# Patient Record
Sex: MALE | Race: WHITE | Employment: OTHER | URBAN - METROPOLITAN AREA
[De-identification: names, ages, dates, MRNs, and addresses within clinical notes are randomized per-mention and may not be internally consistent; named-entity substitution may affect disease eponyms.]

---

## 2017-05-30 ENCOUNTER — ANESTHESIA EVENT (OUTPATIENT)
Dept: SURGERY | Age: 82
DRG: 475 | End: 2017-05-30
Payer: MEDICARE

## 2017-05-30 NOTE — PERIOP NOTES
Spoke to Gerber Maurice at Dr. Caty Appiah office requesting orders, H&P, and any labs/ EKG. Per Gerber Maurice, she will fax them over once all of the documents are received.   DOS: 5/31/2017

## 2017-05-31 ENCOUNTER — ANESTHESIA (OUTPATIENT)
Dept: SURGERY | Age: 82
DRG: 475 | End: 2017-05-31
Payer: MEDICARE

## 2017-05-31 ENCOUNTER — HOSPITAL ENCOUNTER (INPATIENT)
Age: 82
LOS: 5 days | Discharge: HOME HEALTH | DRG: 475 | End: 2017-06-05
Attending: ORTHOPAEDIC SURGERY | Admitting: ORTHOPAEDIC SURGERY
Payer: MEDICARE

## 2017-05-31 PROBLEM — G62.9 NEUROPATHY: Status: ACTIVE | Noted: 2017-05-31

## 2017-05-31 PROBLEM — L08.9 INFECTION OF RIGHT FOOT: Status: ACTIVE | Noted: 2017-05-31

## 2017-05-31 PROBLEM — E13.9 DIABETES 1.5, MANAGED AS TYPE 2 (HCC): Status: ACTIVE | Noted: 2017-05-31

## 2017-05-31 PROBLEM — E03.9 UNSPECIFIED HYPOTHYROIDISM: Status: ACTIVE | Noted: 2017-05-31

## 2017-05-31 PROBLEM — E78.5 HYPERLIPIDEMIA: Status: ACTIVE | Noted: 2017-05-31

## 2017-05-31 LAB
ANION GAP BLD CALC-SCNC: 8 MMOL/L (ref 5–15)
BUN SERPL-MCNC: 18 MG/DL (ref 6–20)
BUN/CREAT SERPL: 12 (ref 12–20)
CALCIUM SERPL-MCNC: 9.7 MG/DL (ref 8.5–10.1)
CHLORIDE SERPL-SCNC: 109 MMOL/L (ref 97–108)
CO2 SERPL-SCNC: 28 MMOL/L (ref 21–32)
CREAT SERPL-MCNC: 1.52 MG/DL (ref 0.7–1.3)
EST. AVERAGE GLUCOSE BLD GHB EST-MCNC: 117 MG/DL
GLUCOSE SERPL-MCNC: 89 MG/DL (ref 65–100)
HBA1C MFR BLD: 5.7 % (ref 4.2–6.3)
MAGNESIUM SERPL-MCNC: 2 MG/DL (ref 1.6–2.4)
POTASSIUM SERPL-SCNC: 4.1 MMOL/L (ref 3.5–5.1)
SODIUM SERPL-SCNC: 145 MMOL/L (ref 136–145)
TSH SERPL DL<=0.05 MIU/L-ACNC: 3.19 UIU/ML (ref 0.36–3.74)
VIT B12 SERPL-MCNC: 1343 PG/ML (ref 211–911)

## 2017-05-31 PROCEDURE — 74011000250 HC RX REV CODE- 250

## 2017-05-31 PROCEDURE — 83735 ASSAY OF MAGNESIUM: CPT | Performed by: INTERNAL MEDICINE

## 2017-05-31 PROCEDURE — 83036 HEMOGLOBIN GLYCOSYLATED A1C: CPT | Performed by: INTERNAL MEDICINE

## 2017-05-31 PROCEDURE — 77030012406 HC DRN WND PENRS BARD -A: Performed by: ORTHOPAEDIC SURGERY

## 2017-05-31 PROCEDURE — 76210000006 HC OR PH I REC 0.5 TO 1 HR: Performed by: ORTHOPAEDIC SURGERY

## 2017-05-31 PROCEDURE — 84443 ASSAY THYROID STIM HORMONE: CPT | Performed by: INTERNAL MEDICINE

## 2017-05-31 PROCEDURE — 77030018836 HC SOL IRR NACL ICUM -A: Performed by: ORTHOPAEDIC SURGERY

## 2017-05-31 PROCEDURE — 76010000138 HC OR TIME 0.5 TO 1 HR: Performed by: ORTHOPAEDIC SURGERY

## 2017-05-31 PROCEDURE — 74011250636 HC RX REV CODE- 250/636: Performed by: ANESTHESIOLOGY

## 2017-05-31 PROCEDURE — 74011250637 HC RX REV CODE- 250/637: Performed by: ORTHOPAEDIC SURGERY

## 2017-05-31 PROCEDURE — 74011250636 HC RX REV CODE- 250/636

## 2017-05-31 PROCEDURE — 65270000029 HC RM PRIVATE

## 2017-05-31 PROCEDURE — 77030000032 HC CUF TRNQT ZIMM -B: Performed by: ORTHOPAEDIC SURGERY

## 2017-05-31 PROCEDURE — 77030020782 HC GWN BAIR PAWS FLX 3M -B

## 2017-05-31 PROCEDURE — 76060000032 HC ANESTHESIA 0.5 TO 1 HR: Performed by: ORTHOPAEDIC SURGERY

## 2017-05-31 PROCEDURE — 87205 SMEAR GRAM STAIN: CPT | Performed by: ORTHOPAEDIC SURGERY

## 2017-05-31 PROCEDURE — 77010033678 HC OXYGEN DAILY

## 2017-05-31 PROCEDURE — 74011250636 HC RX REV CODE- 250/636: Performed by: INTERNAL MEDICINE

## 2017-05-31 PROCEDURE — 87075 CULTR BACTERIA EXCEPT BLOOD: CPT | Performed by: ORTHOPAEDIC SURGERY

## 2017-05-31 PROCEDURE — 88311 DECALCIFY TISSUE: CPT | Performed by: ORTHOPAEDIC SURGERY

## 2017-05-31 PROCEDURE — 77030002933 HC SUT MCRYL J&J -A: Performed by: ORTHOPAEDIC SURGERY

## 2017-05-31 PROCEDURE — 77030002916 HC SUT ETHLN J&J -A: Performed by: ORTHOPAEDIC SURGERY

## 2017-05-31 PROCEDURE — 77030011640 HC PAD GRND REM COVD -A: Performed by: ORTHOPAEDIC SURGERY

## 2017-05-31 PROCEDURE — 36415 COLL VENOUS BLD VENIPUNCTURE: CPT | Performed by: INTERNAL MEDICINE

## 2017-05-31 PROCEDURE — 88305 TISSUE EXAM BY PATHOLOGIST: CPT | Performed by: ORTHOPAEDIC SURGERY

## 2017-05-31 PROCEDURE — 0Y6M0ZF DETACHMENT AT RIGHT FOOT, PARTIAL 5TH RAY, OPEN APPROACH: ICD-10-PCS | Performed by: ORTHOPAEDIC SURGERY

## 2017-05-31 PROCEDURE — 82607 VITAMIN B-12: CPT | Performed by: INTERNAL MEDICINE

## 2017-05-31 PROCEDURE — 80048 BASIC METABOLIC PNL TOTAL CA: CPT | Performed by: INTERNAL MEDICINE

## 2017-05-31 RX ORDER — SODIUM CHLORIDE, SODIUM LACTATE, POTASSIUM CHLORIDE, CALCIUM CHLORIDE 600; 310; 30; 20 MG/100ML; MG/100ML; MG/100ML; MG/100ML
125 INJECTION, SOLUTION INTRAVENOUS CONTINUOUS
Status: DISCONTINUED | OUTPATIENT
Start: 2017-05-31 | End: 2017-05-31 | Stop reason: HOSPADM

## 2017-05-31 RX ORDER — MELATONIN
1000 DAILY
Status: DISCONTINUED | OUTPATIENT
Start: 2017-06-01 | End: 2017-06-05 | Stop reason: HOSPADM

## 2017-05-31 RX ORDER — LIDOCAINE HYDROCHLORIDE 20 MG/ML
INJECTION, SOLUTION EPIDURAL; INFILTRATION; INTRACAUDAL; PERINEURAL AS NEEDED
Status: DISCONTINUED | OUTPATIENT
Start: 2017-05-31 | End: 2017-05-31 | Stop reason: HOSPADM

## 2017-05-31 RX ORDER — THERA TABS 400 MCG
1 TAB ORAL DAILY
Status: DISCONTINUED | OUTPATIENT
Start: 2017-06-01 | End: 2017-06-05 | Stop reason: HOSPADM

## 2017-05-31 RX ORDER — SODIUM CHLORIDE 0.9 % (FLUSH) 0.9 %
5-10 SYRINGE (ML) INJECTION EVERY 8 HOURS
Status: DISCONTINUED | OUTPATIENT
Start: 2017-05-31 | End: 2017-06-05 | Stop reason: HOSPADM

## 2017-05-31 RX ORDER — LIDOCAINE HYDROCHLORIDE 10 MG/ML
0.1 INJECTION, SOLUTION EPIDURAL; INFILTRATION; INTRACAUDAL; PERINEURAL AS NEEDED
Status: DISCONTINUED | OUTPATIENT
Start: 2017-05-31 | End: 2017-05-31 | Stop reason: HOSPADM

## 2017-05-31 RX ORDER — MIDAZOLAM HYDROCHLORIDE 1 MG/ML
INJECTION, SOLUTION INTRAMUSCULAR; INTRAVENOUS AS NEEDED
Status: DISCONTINUED | OUTPATIENT
Start: 2017-05-31 | End: 2017-05-31 | Stop reason: HOSPADM

## 2017-05-31 RX ORDER — FENTANYL CITRATE 50 UG/ML
INJECTION, SOLUTION INTRAMUSCULAR; INTRAVENOUS AS NEEDED
Status: DISCONTINUED | OUTPATIENT
Start: 2017-05-31 | End: 2017-05-31 | Stop reason: HOSPADM

## 2017-05-31 RX ORDER — ASPIRIN 325 MG
325 TABLET ORAL DAILY
Status: DISCONTINUED | OUTPATIENT
Start: 2017-06-01 | End: 2017-06-05 | Stop reason: HOSPADM

## 2017-05-31 RX ORDER — SODIUM CHLORIDE 9 MG/ML
75 INJECTION, SOLUTION INTRAVENOUS CONTINUOUS
Status: DISCONTINUED | OUTPATIENT
Start: 2017-05-31 | End: 2017-06-04

## 2017-05-31 RX ORDER — SODIUM CHLORIDE 0.9 % (FLUSH) 0.9 %
5-10 SYRINGE (ML) INJECTION AS NEEDED
Status: DISCONTINUED | OUTPATIENT
Start: 2017-05-31 | End: 2017-05-31 | Stop reason: HOSPADM

## 2017-05-31 RX ORDER — OXYCODONE AND ACETAMINOPHEN 5; 325 MG/1; MG/1
1 TABLET ORAL
Status: DISCONTINUED | OUTPATIENT
Start: 2017-05-31 | End: 2017-06-05 | Stop reason: HOSPADM

## 2017-05-31 RX ORDER — SODIUM CHLORIDE 0.9 % (FLUSH) 0.9 %
5-10 SYRINGE (ML) INJECTION EVERY 8 HOURS
Status: DISCONTINUED | OUTPATIENT
Start: 2017-05-31 | End: 2017-05-31 | Stop reason: HOSPADM

## 2017-05-31 RX ORDER — HYDROMORPHONE HYDROCHLORIDE 1 MG/ML
.5-1 INJECTION, SOLUTION INTRAMUSCULAR; INTRAVENOUS; SUBCUTANEOUS
Status: DISCONTINUED | OUTPATIENT
Start: 2017-05-31 | End: 2017-05-31 | Stop reason: HOSPADM

## 2017-05-31 RX ORDER — ONDANSETRON 2 MG/ML
4 INJECTION INTRAMUSCULAR; INTRAVENOUS AS NEEDED
Status: DISCONTINUED | OUTPATIENT
Start: 2017-05-31 | End: 2017-05-31 | Stop reason: HOSPADM

## 2017-05-31 RX ORDER — ATORVASTATIN CALCIUM 10 MG/1
10 TABLET, FILM COATED ORAL
Status: DISCONTINUED | OUTPATIENT
Start: 2017-05-31 | End: 2017-06-05 | Stop reason: HOSPADM

## 2017-05-31 RX ORDER — LEVOTHYROXINE SODIUM 100 UG/1
100 TABLET ORAL
Status: DISCONTINUED | OUTPATIENT
Start: 2017-06-01 | End: 2017-06-05 | Stop reason: HOSPADM

## 2017-05-31 RX ORDER — SODIUM CHLORIDE 0.9 % (FLUSH) 0.9 %
5-10 SYRINGE (ML) INJECTION AS NEEDED
Status: DISCONTINUED | OUTPATIENT
Start: 2017-05-31 | End: 2017-06-05 | Stop reason: HOSPADM

## 2017-05-31 RX ORDER — PROPOFOL 10 MG/ML
INJECTION, EMULSION INTRAVENOUS
Status: DISCONTINUED | OUTPATIENT
Start: 2017-05-31 | End: 2017-05-31 | Stop reason: HOSPADM

## 2017-05-31 RX ORDER — LIDOCAINE HYDROCHLORIDE 20 MG/ML
INJECTION, SOLUTION EPIDURAL; INFILTRATION; INTRACAUDAL; PERINEURAL AS NEEDED
Status: DISCONTINUED | OUTPATIENT
Start: 2017-05-31 | End: 2017-05-31

## 2017-05-31 RX ADMIN — Medication 10 ML: at 15:42

## 2017-05-31 RX ADMIN — MIDAZOLAM HYDROCHLORIDE 2 MG: 1 INJECTION, SOLUTION INTRAMUSCULAR; INTRAVENOUS at 10:15

## 2017-05-31 RX ADMIN — PROPOFOL 75 MCG/KG/MIN: 10 INJECTION, EMULSION INTRAVENOUS at 11:20

## 2017-05-31 RX ADMIN — ATORVASTATIN CALCIUM 10 MG: 10 TABLET, FILM COATED ORAL at 21:33

## 2017-05-31 RX ADMIN — MIDAZOLAM HYDROCHLORIDE 1 MG: 1 INJECTION, SOLUTION INTRAMUSCULAR; INTRAVENOUS at 10:17

## 2017-05-31 RX ADMIN — FENTANYL CITRATE 50 MCG: 50 INJECTION, SOLUTION INTRAMUSCULAR; INTRAVENOUS at 10:15

## 2017-05-31 RX ADMIN — SODIUM CHLORIDE, SODIUM LACTATE, POTASSIUM CHLORIDE, AND CALCIUM CHLORIDE 125 ML/HR: 600; 310; 30; 20 INJECTION, SOLUTION INTRAVENOUS at 09:24

## 2017-05-31 RX ADMIN — LIDOCAINE HYDROCHLORIDE 80 MG: 20 INJECTION, SOLUTION EPIDURAL; INFILTRATION; INTRACAUDAL; PERINEURAL at 12:03

## 2017-05-31 RX ADMIN — Medication 10 ML: at 21:34

## 2017-05-31 RX ADMIN — SODIUM CHLORIDE 75 ML/HR: 900 INJECTION, SOLUTION INTRAVENOUS at 16:58

## 2017-05-31 NOTE — PROGRESS NOTES
5/31/2017 4:13 PM Met with pt and pt's wife, Darcie Awad. Charted address and phone number confirmed. Pt lives with his wife in a 1 story home in [place], there are 4 steps to enter. Pt has rx coverage and fills his scripts at [facility]. EMR reviewed, ID consult noted. Discussed possible iv abx and home health with pt. At  [facility] was selected as preferred home health company. [facility] was selected as preferred iv infusion company. Referrals sent to both agencies via [employer]. CM will follow up. МАРИЯ Torres  Care Management Interventions  PCP Verified by CM: Yes [facility] )  Mode of Transport at Discharge:  Other (see comment) (Pt's wife )  MyChart Signup: No  Discharge Durable Medical Equipment: No (Pt owns a cane and RW. )  Physical Therapy Consult: Yes  Occupational Therapy Consult: No  Speech Therapy Consult: No  Current Support Network: Lives with Spouse  Confirm Follow Up Transport: Family

## 2017-05-31 NOTE — PROGRESS NOTES
1355: Left message with Dr Akira Sanon [facility] nurse Roshan April to contact ID at [phone] as per new policy to have MD call their office. Bedside and Verbal shift change report given to Leah (oncoming nurse) by [facility] (offgoing nurse). Report included the following information SBAR, Kardex, Procedure Summary, Intake/Output, MAR and Recent Results.

## 2017-05-31 NOTE — CONSULTS
Consult History and Physical Exam    NAME:  Lani Mayorga   :   1935   MRN:  [id number]     PCP:  Magaly Gallegos MD   Referring: Zane Rodarte MD     Date/Time:  2017           Assessment/Plan:       Neuropathy (2017): Sounds to be idiopathic. Prior testing has been unrevealing.   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 -- wound care per ortho   -- ID has been consulted for abx management    **3:52 PM  Has elevated creatinine. Unknown if this is new or chronic. Will ask for records from PCP to compare. Subjective:   REASON FOR CONSULT:  \"Diabetes\"    CHIEF COMPLAINT:  \"I don't have diabetes\"    HISTORY OF PRESENT ILLNESS:     Mr. Courtney Cool is a 81 y.o.  male who is admitted for right foot surgery due to neuropathic ulcer with infection. Mr. Courtney Cool today complains of feeling well. Denies hx of diabetes. Has been followed by PCP with testing. Has had neuropathy of both LE for at least 7 years and has been through testing without cause for the neuropathy. Denies fh of diabetes as well. Denies chest pain or sob. No n/v. Does not recall being tested for b12 deficiencies. Denies pain from foot currently.       Past Medical History:   Diagnosis Date    Cancer [facility]) 2009    MELANOMA,   PROSTATE    Hyperlipidemia     Thyroid disease         Past Surgical History:   Procedure Laterality Date    HX ABDOMINAL WALL DEFECT REPAIR  8427    UMBILICAL    HX APPENDECTOMY      HX OTHER SURGICAL  2002    BENIGN TUMOR THYROID    HX PROSTATECTOMY  2009    HX TONSILLECTOMY         Social History   Substance Use Topics    Smoking status: Never Smoker    Smokeless tobacco: Never Used    Alcohol use Yes      Comment: [id number] [facility] History   Problem Relation Age of Onset    Alcohol abuse Mother     Hypertension Mother     Heart Disease Mother     Cancer Brother      COLON        No Known Allergies     Prior to Admission medications    Medication Sig Start Date End Date Taking? Authorizing Provider   atorvastatin (LIPITOR) 10 mg tablet Take 10 mg by mouth nightly. Yes Historical Provider   Cholecalciferol, Vitamin D3, (VITAMIN D) 1,000 unit Cap Take  by mouth daily. Yes Historical Provider   multivitamin (ONE A DAY) tablet Take 1 Tab by mouth daily. Yes Historical Provider   oxyCODONE-acetaminophen (PERCOCET) 5-325 mg per tablet Take 1 Tab by mouth every six (6) hours as needed for Pain. 2/3/12   Boby Oquendo MD   levothyroxine (SYNTHROID) 100 mcg tablet Take 100 mcg by mouth Daily (before breakfast). Historical Provider   aspirin (ASPIRIN) 325 mg tablet Take 325 mg by mouth daily.     Historical Provider         Review of Systems:  (bold if positive, if negative)    Gen:  Eyes:  ENT:  CVS:  Pulm:  GI:    :    MS:  Skin:  Endo:    Hem:  Renal:    Neuro:  Numbnesstingling          Objective:      VITALS:    Vital signs reviewed; most recent are:    Visit Vitals    /54 (BP 1 Location: Right arm, BP Patient Position: At rest)    Pulse (!) 53    Temp 97.9 °F (36.6 °C)    Resp 16    Ht 5' 9\" (1.753 m)    Wt 73.6 kg (162 lb 4.1 oz)    SpO2 98%    BMI 23.96 kg/m2     SpO2 Readings from Last 6 Encounters:   05/31/17 98%   02/03/12 99%    O2 Flow Rate (L/min): 2 l/min     Intake/Output Summary (Last 24 hours) at 05/31/17 1339  Last data filed at 05/31/17 1215   Gross per 24 hour   Intake              900 ml   Output               20 ml   Net              880 ml            Exam:     Physical Exam:    Gen:  Well-developed, well-nourished, in no acute distress  HEENT:  Pink conjunctivae, hearing intact to voice, moist mucous membranes  Resp:  No accessory muscle use, clear breath sounds without wheezes rales or rhonchi  Card:  No murmurs, normal S1, S2 without thrills or peripheral edema  Abd:  Soft, non-tender, non-distended, normoactive bowel sounds are present  Musc:  No cyanosis  Skin:  No rashes, right foot in dressing  Neuro:  Cranial nerves 3-12 are grossly intact,  strength is 5/5 bilaterally, follows commands appropriately  Psych:  Alert with good insight. Oriented to person, place, and time       Reviewed prior medical records in preparation for this consult: Old medical records.     Surrogate decision maker:  spouse    Total time spent with patient: 48 [address] discussed with: Patient and Family    Discussed:  Care Plan         ___________________________________________________    Attending Physician: Yanira Julien MD

## 2017-05-31 NOTE — PROGRESS NOTES
POST ANESTHESIA CARE   DISCHARGE or TRANSFER NOTE    Lani Mayorga was   transfered      via     Bed    To   hospital room 410   . Patient was escorted by    nurse   . Patient verbalized   appreciation and was very pleased with care received  throughout their stay. Patient was discharged in   pleasant mood   . Pain at discharge/transfer was    0 /10. All personal belongings have been returned to patient, and patient/family verbally confirm receiving belongings as all present. TRANSFER - OUT REPORT:    Verbal report given to   Jose Ramon Villa RN  On   Lani Mayorga   being transferred to   [address] [place] [address]   for routine post - op       Report consisted of patients Situation, Background, Assessment and   Recommendations(SBAR). Information from the following report(s) SBAR, OR Summary and MAR was reviewed with the receiving nurse. There are currently no Active Isolations   Lines:   Peripheral IV 02/03/12 Left Hand (Active)       Peripheral IV 05/31/17 Left Forearm (Active)   Site Assessment Clean, dry, & intact 5/31/2017 12:15 PM   Phlebitis Assessment 0 5/31/2017 12:15 PM   Infiltration Assessment 0 5/31/2017 12:15 PM   Dressing Status Clean, dry, & intact 5/31/2017 12:15 PM   Dressing Type Transparent;Tape 5/31/2017 12:15 PM   Hub Color/Line Status Patent; Infusing 5/31/2017 12:15 PM   Alcohol Cap Used Yes 5/31/2017  9:22 AM       Patient transported with:       Registered Nurse    Opportunity for questions and clarification was provided.       Macdonald Claude BSN RN-BC

## 2017-05-31 NOTE — IP AVS SNAPSHOT
Current Discharge Medication List  
  
CONTINUE these medications which have NOT CHANGED Dose & Instructions Dispensing Information Comments Morning Noon Evening Bedtime  
 aspirin 325 mg tablet Commonly known as:  ASPIRIN Your last dose was: Your next dose is:    
   
   
 Dose:  325 mg Take 325 mg by mouth daily. Refills:  0 LIPITOR 10 mg tablet Generic drug:  atorvastatin Your last dose was: Your next dose is:    
   
   
 Dose:  10 mg Take 10 mg by mouth nightly. Refills:  0  
     
   
   
   
  
 multivitamin tablet Commonly known as:  ONE A DAY Your last dose was: Your next dose is:    
   
   
 Dose:  1 Tab Take 1 Tab by mouth daily. Refills:  0  
     
   
   
   
  
 oxyCODONE-acetaminophen 5-325 mg per tablet Commonly known as:  PERCOCET Your last dose was: Your next dose is:    
   
   
 Dose:  1 Tab Take 1 Tab by mouth every six (6) hours as needed for Pain. Quantity:  30 Tab Refills:  0  
     
   
   
   
  
 SYNTHROID 100 mcg tablet Generic drug:  levothyroxine Your last dose was: Your next dose is:    
   
   
 Dose:  100 mcg Take 100 mcg by mouth Daily (before breakfast). Refills:  0  
     
   
   
   
  
 VITAMIN D3 1,000 unit Cap Generic drug:  cholecalciferol Your last dose was: Your next dose is: Take  by mouth daily. Refills:  0

## 2017-05-31 NOTE — ANESTHESIA POSTPROCEDURE EVALUATION
Post-Anesthesia Evaluation and Assessment    Patient: Antonino Bradford MRN: [id number]  SSN: [id number]    YOB: 1935  Age: 81 y.o. Sex: male       Cardiovascular Function/Vital Signs  Visit Vitals    /55    Pulse (!) 54    Temp 36.4 °C (97.5 °F)    Resp 16    Ht 5' 9\" (1.753 m)    Wt 73.6 kg (162 lb 4.1 oz)    SpO2 98%    BMI 23.96 kg/m2       Patient is status post regional, MAC anesthesia for Procedure(s):  AMPUTATION RIGHT 5TH TOE (ANKLE BLOCK). Nausea/Vomiting: None    Postoperative hydration reviewed and adequate. Pain:  Pain Scale 1: Numeric (0 - 10) (05/31/17 1214)  Pain Intensity 1: 0 (05/31/17 1214)   Managed    Neurological Status:   Neuro (WDL): Within Defined Limits (05/31/17 1214)  Neuro  Neurologic State: Drowsy; Appropriate for age (05/31/17 1214)   At baseline    Mental Status and Level of Consciousness: Arousable    Pulmonary Status:   O2 Device: Room air (05/31/17 1220)   Adequate oxygenation and airway patent    Complications related to anesthesia: None    Post-anesthesia assessment completed.  No concerns    Signed By: Sandra Salazar MD     May 31, 2017

## 2017-05-31 NOTE — ANESTHESIA PROCEDURE NOTES
Peripheral Block    Start time: 5/31/2017 10:15 AM  End time: 5/31/2017 10:25 AM  Performed by: Jamie Trammell  Authorized by: Jamie Trammell       Pre-procedure: Indications: at surgeon's request and primary anesthetic    Preanesthetic Checklist: patient identified, risks and benefits discussed, site marked, timeout performed, anesthesia consent given and patient being monitored    Timeout Time: 10:15          Block Type:   Block Type:   Ankle  Laterality:  Right  Monitoring:  Continuous pulse ox, frequent vital sign checks, heart rate, responsive to questions and oxygen  Injection Technique:  Single shot  Patient Position: seated  Prep: chlorhexidine    Location:  Foot  Needle Localization:  Anatomical landmarks, infiltration and ultrasound guidance  Medication Injected:  0.5%  ropivacaine  Volume (mL):  20  Add'l Medication Injected:  2.0%  mepivacaine  Volume (mL):  20    Assessment:  Number of attempts:  1  Injection Assessment:  Incremental injection every 5 mL, local visualized surrounding nerve on ultrasound, negative aspiration for blood, no paresthesia and no intravascular symptoms  Patient tolerance:  Patient tolerated the procedure well with no immediate complications

## 2017-05-31 NOTE — IP AVS SNAPSHOT
Anatoliy Murphy 
 
 
 [address] 
[phone] Patient: Trina Bourgeois MRN: [id number] CXH:5/3/1414 You are allergic to the following No active allergies Recent Documentation Height Weight BMI Smoking Status 1.753 m 73.6 kg 23.96 kg/m2 Never Smoker Unresulted Labs Order Current Status HAPTOGLOBIN In process Emergency Contacts Name Discharge Info Relation Home Work Mobile [place] Senior DISCHARGE CAREGIVER [3] Spouse [3] [phone] About your hospitalization You were admitted on:  May 31, 2017 You last received care in the:  [facility] POST SURG ORT 1 You were discharged on:  June 5, 2017 Unit phone number:  [phone] Why you were hospitalized Your primary diagnosis was:  Not on File Your diagnoses also included:  Neuropathy, Infection Of Right Foot, Unspecified Hypothyroidism, Hyperlipidemia, Cmml (Chronic Myelomonocytic Leukemia) (Hcc), Ckd (Chronic Kidney Disease) Stage 3, Gfr 30-59 Ml/Min Providers Seen During Your Hospitalizations Provider Role Specialty Primary office phone Coty Chowdhury MD Attending Provider Orthopedic Surgery [phone] Your Primary Care Physician (PCP) Primary Care Physician Office Phone Office Fax Nadeem Reyes [phone] Follow-up Information Follow up With Details Comments Contact Info Lynn Evans [phone] on 6/6/2017 Your appointment is at [address] [address], [place], [address] [facility]  
[phone] / Fax: [phone] Coty Chowdhury MD On 6/12/2017 Appointment is at [address] [place] [address]. For 1 week follow up. If you cannot make this appointment please call the office to reschedule. [address] [place] [address] [address] [facility] [address] [place] [address] 
[phone]  Fernando Queen MD   [facility]  
 [address] [facility] [address] 
[phone] Your Appointments Tuesday June 06, 2017  3:00 PM EDT INFUSION [address] [place] with [address]  
[address] ([address]) 11 [facility] [address] [address]  
[phone] Wednesday June 07, 2017  3:00 PM EDT INFUSION [address] [place] with [address] [place] [address]  
[address] ([address]) 11 [facility] [address] [address]  
[phone] Thursday June 08, 2017  3:00 PM EDT INFUSION 90 [place] with [address] [place] [address]  
[address] ([address]) 11 [facility] [address] [address]  
[phone] Friday June 09, 2017  3:00 PM EDT INFUSION 90 [place] with [address] [place] [address]  
[address] ([address]) 11 [facility] [address] [address]  
[phone] Current Discharge Medication List  
  
CONTINUE these medications which have NOT CHANGED Dose & Instructions Dispensing Information Comments Morning Noon Evening Bedtime  
 aspirin 325 mg tablet Commonly known as:  ASPIRIN Your last dose was: Your next dose is:    
   
   
 Dose:  325 mg Take 325 mg by mouth daily. Refills:  0 LIPITOR 10 mg tablet Generic drug:  atorvastatin Your last dose was: Your next dose is:    
   
   
 Dose:  10 mg Take 10 mg by mouth nightly. Refills:  0  
     
   
   
   
  
 multivitamin tablet Commonly known as:  ONE A DAY Your last dose was: Your next dose is:    
   
   
 Dose:  1 Tab Take 1 Tab by mouth daily. Refills:  0  
     
   
   
   
  
 oxyCODONE-acetaminophen 5-325 mg per tablet Commonly known as:  PERCOCET Your last dose was: Your next dose is:    
   
   
 Dose:  1 Tab Take 1 Tab by mouth every six (6) hours as needed for Pain. Quantity:  30 Tab Refills:  0  
     
   
   
   
  
 SYNTHROID 100 mcg tablet Generic drug:  levothyroxine Your last dose was: Your next dose is:    
   
   
 Dose:  100 mcg Take 100 mcg by mouth Daily (before breakfast). Refills:  0  
     
   
   
   
  
 VITAMIN D3 1,000 unit Cap Generic drug:  cholecalciferol Your last dose was: Your next dose is: Take  by mouth daily. Refills:  0 Discharge Instructions Foot and Ankle Surgery Postop Care Dr. Cherelle Reyna Most importantly, go home and rest. 
 
Elevate your foot to heart level as much as possible. You may use ice to help with the pain. Note: Frozen peas work fine :) Change your dressing daily as instructed in the hosptial 
 
Do NOT bear weight on your foot unless specifically allowed to do so. You may bathe, but you MUST keep the dressing dry. Avoid making important decisions or driving until you are no longer taking the prescribed pain medication. Important signs and symptoms:   
 
If any of the following signs and symptoms occurs, [facility]. Please be advised if a problem arises which you feel requires immediate medical attention or you are unable to contact Dr. Lopez [facility] office you should seek immediate medical attention. Signs and symptoms to watch for include: 1. A sudden increase in swelling and /or redness or warmth at the area your surgery was performed which isnt relieved by rest, ice and elevation. 2.  Oral temperature greater than 101 degrees for 12 hours or more which isnt relieved by an increase in fluid intake and taking two Tylenol every 4-6 hours. Do not exceed 4000mg of Tylenol per day. 3.  Excessive drainage from your incisions or drainage that hasnt stopped by 72 hours. 4.  Calf pain, tenderness, redness or swelling which isnt relieved with rest and elevation. 5.  Fever, chills, nausea, vomiting or other signs and symptoms which are of concern to you. 6.  If sudden shortness of breath or chest pain occur, go to the ER or call an ambulance immediately! Call Dr. Génesis Sánchez on your way to the hospital or after you arrive. Follow up: Your Post-op appointment date & time: Monday, June 12 at 8am - [address] location Call our office at [phone] ext [phone] at any time should any complications occur, especially if you develop a fever above 101° F. Dr. Cheryle Quiñones MD & Team 
 
 
 
 
 
 
 
 
 
 
  
 
 
Discharge Orders None Introducing [facility] SERVICES! Connie Roque introduces [employer] patient portal. Now you can access parts of your medical record, email your doctor's office, and request medication refills online. 1. In your internet browser, go to [email or web address] 2. Click on the First Time User? Click Here link in the Sign In box. You will see the New Member Sign Up page. 3. Enter your [employer] Access Code exactly as it appears below. You will not need to use this code after youve completed the sign-up process. If you do not sign up before the expiration date, you must request a new code. · [employer] Access Code: [email or web address] Expires: 8/29/2017  7:55 AM 
 
4. Enter the last four digits of your Social Security Number (xxxx) and Date of Birth (mm/dd/yyyy) as indicated and click Submit. You will be taken to the next sign-up page. 5. Create a THY[employer]t ID. This will be your [employer] login ID and cannot be changed, so think of one that is secure and easy to remember. 6. Create a [employer] password. You can change your password at any time. 7. Enter your Password Reset Question and Answer. This can be used at a later time if you forget your password. 8. Enter your e-mail address.  You will receive e-mail notification when new information is available in [employer]. 9. Click Sign Up. You can now view and download portions of your medical record. 10. Click the Download Summary menu link to download a portable copy of your medical information. If you have questions, please visit the Frequently Asked Questions section of the [employer] website. Remember, [employer] is NOT to be used for urgent needs. For medical emergencies, dial 911. Now available from your iPhone and Android! General Information Please provide this summary of care documentation to your next provider. Patient Signature:  ____________________________________________________________ Date:  ____________________________________________________________  
  
Giacomo Judson Provider Signature:  ____________________________________________________________ Date:  ____________________________________________________________

## 2017-05-31 NOTE — ANESTHESIA PREPROCEDURE EVALUATION
Anesthetic History   No history of anesthetic complications            Review of Systems / Medical History  Patient summary reviewed, nursing notes reviewed and pertinent labs reviewed    Pulmonary  Within defined limits                 Neuro/Psych   Within defined limits           Cardiovascular              Hyperlipidemia    Exercise tolerance: >4 METS     GI/Hepatic/Renal  Within defined limits              Endo/Other      Hypothyroidism: well controlled  Cancer (melanoma, prostate, anal)     Other Findings              Physical Exam    Airway  Mallampati: II  TM Distance: < 4 cm  Neck ROM: normal range of motion   Mouth opening: Normal     Cardiovascular    Rhythm: regular  Rate: normal         Dental    Dentition: Upper dentition intact and Lower dentition intact     Pulmonary  Breath sounds clear to auscultation               Abdominal  GI exam deferred       Other Findings            Anesthetic Plan    ASA: 2  Anesthesia type: regional and MAC - ankle block          Induction: Intravenous  Anesthetic plan and risks discussed with: Patient

## 2017-05-31 NOTE — PERIOP NOTES
PACU IN REPORT FROM ANESTHESIA    Verbal report received from   Matthew 70   +SRNA    following Other for Procedure(s) (LRB):  AMPUTATION RIGHT 5TH TOE (ANKLE BLOCK) (Right) by  Zane Rodarte MD.    Note the anesthesia record for medications given intraoperatively. Brief Initial Visual Assessment:    Airway is:   Patent   Respiratory pattern is:    Even, Non-labored. Patient is:     alert and oriented x 2 (Person and Place.)   Skin is:   Pink, Warm and Dry. Membranes are:    Pink and Moist.   Patient is in:    No Acute Discomfort. 0 /10 pain using     Verbal Numeric Scale. - Note E-MAR for medications administered. Note assessments documented in flowsheets;any assessment variants to be found in comments or narrative perioperative nurse notes.        Post-anesthesia care now assumed, record signed by Macdonald Claude BSN, RN-BC

## 2017-05-31 NOTE — BRIEF OP NOTE
BRIEF OPERATIVE NOTE    Date of Procedure: 5/31/2017   Preoperative Diagnosis: NEUROPATHIC ULCER RIGHT FOOT   Postoperative Diagnosis: NEUROPATHIC ULCER RIGHT FOOT     Procedure(s):  AMPUTATION RIGHT 5TH TOE (ANKLE BLOCK)  Surgeon(s) and Role:     * Morenita Lopez MD - Primary         Assistant Staff:       Surgical Staff:  Circ-1: Ranulfo Montez RN  Circ-Relief: Delia Hendrix RN  Scrub Tech-1: Abundio Copper  Scrub Tech-Relief: Napoleanabel [place]  Surg Asst-1: Hiwot Cordell  Surg Asst-Relief: Carla Razo  Event Time In   Incision Start 1138   Incision Close 1206     Anesthesia: Other   Estimated Blood Loss: 0  Specimens:   ID Type Source Tests Collected by Time Destination   1 : 5th toe right foot Preservative Toe  Morenita Lopez MD 5/31/2017 1149 Pathology   1 : Wound right foot Wound Foot, Right CULTURE, ANAEROBIC, CULTURE, WOUND W GRAM STAIN Morenita Lopez MD 5/31/2017 1141 Microbiology      Findings: 0   Complications: 0  Implants: * No implants in log *

## 2017-05-31 NOTE — OP NOTES
Gavin Ron [facility] 79   201 [facility], [address]   OP NOTE       Name:  Adina Shanks   MR#:  [id number]   :  1935   Account #:  [de-identified]    Surgery Date:  2017   Date of Adm:  2017       PREOPERATIVE DIAGNOSIS: Neuropathic ulcer with sepsis, right   foot. POSTOPERATIVE DIAGNOSIS: Neuropathic ulcer with sepsis, right   foot. PROCEDURES PERFORMED: Transmetatarsal amputation, right foot,   fifth ray. SURGEON: Suni Tellez MD    ANESTHESIA: Ankle block. COMPLICATIONS: None encountered. TOURNIQUET TIME: 20 minutes. SPECIMENS REMOVED: Toe, cultures, aerobic and anaerobic, were   taken at the metatarsophalangeal joint. COMPLICATIONS: None. ESTIMATED BLOOD LOSS: Nil. DESCRIPTION OF PROCEDURE: After consents were obtained and   preoperative sedation, the patient was taken to the operating suites   and underwent an ankle block without difficulty. A pneumatic tourniquet   was placed around the right ankle, and the right foot was scrubbed and   draped in the usual sterile fashion. After Esmarch exsanguination,  the   tourniquet was inflated to 300 mmHg. A racket incision was made   surrounding the base of the fifth toe and extending laterally along the   fifth metatarsal. The incision was carried through skin and   subcutaneous tissue down to bone. Subperiosteal elevation was   carried out over the fifth metatarsal. The capsule was elevated from   the fifth metatarsal dorsally. The capsule was removed from the   proximal phalanx circumferentially and subperiosteal dissection was   carried out, allowing us to remove the toe and send to Pathology. Cultures, both aerobic and anaerobic, were taken from the   metatarsophalangeal joint, where inflamed synovial tissue, but no   purulence was present.  Copious irrigation was carried out, and then a   small Hemovac drain was placed into the wound and the wound was   closed loosely in layers using 3-0 Monocryl suture for the superficial   fascia, and 3-0 nylon in interrupted fashion for the skin. A sterile bulky   dressing was applied. The ingress tubing was secured and after   dressing was applied, the ingress irrigation was activated. The   tourniquet was then deflated. He was awakened from anesthesia and   taken to the recovery room in satisfactory condition.         MD Talia Rincon / PRINCESS   D:  05/31/2017   12:28   T:  05/31/2017   13:26   Job #:  [id number]

## 2017-06-01 PROCEDURE — 74011250637 HC RX REV CODE- 250/637: Performed by: ORTHOPAEDIC SURGERY

## 2017-06-01 PROCEDURE — 97530 THERAPEUTIC ACTIVITIES: CPT

## 2017-06-01 PROCEDURE — 74011000258 HC RX REV CODE- 258: Performed by: INTERNAL MEDICINE

## 2017-06-01 PROCEDURE — 84300 ASSAY OF URINE SODIUM: CPT | Performed by: INTERNAL MEDICINE

## 2017-06-01 PROCEDURE — 65270000029 HC RM PRIVATE

## 2017-06-01 PROCEDURE — 74011250636 HC RX REV CODE- 250/636: Performed by: INTERNAL MEDICINE

## 2017-06-01 PROCEDURE — 82570 ASSAY OF URINE CREATININE: CPT | Performed by: INTERNAL MEDICINE

## 2017-06-01 PROCEDURE — 97161 PT EVAL LOW COMPLEX 20 MIN: CPT

## 2017-06-01 PROCEDURE — 97116 GAIT TRAINING THERAPY: CPT

## 2017-06-01 PROCEDURE — 81001 URINALYSIS AUTO W/SCOPE: CPT | Performed by: INTERNAL MEDICINE

## 2017-06-01 RX ADMIN — VITAMIN D, TAB 1000IU (100/BT) 1000 UNITS: 25 TAB at 08:39

## 2017-06-01 RX ADMIN — ATORVASTATIN CALCIUM 10 MG: 10 TABLET, FILM COATED ORAL at 22:02

## 2017-06-01 RX ADMIN — CEFTRIAXONE 2 G: 2 INJECTION, POWDER, FOR SOLUTION INTRAMUSCULAR; INTRAVENOUS at 10:12

## 2017-06-01 RX ADMIN — SODIUM CHLORIDE 75 ML/HR: 900 INJECTION, SOLUTION INTRAVENOUS at 05:47

## 2017-06-01 RX ADMIN — THERA TABS 1 TABLET: TAB at 08:39

## 2017-06-01 RX ADMIN — ASPIRIN 325 MG: 325 TABLET, COATED ORAL at 08:39

## 2017-06-01 RX ADMIN — VANCOMYCIN HYDROCHLORIDE 1750 MG: 10 INJECTION, POWDER, LYOPHILIZED, FOR SOLUTION INTRAVENOUS at 10:17

## 2017-06-01 RX ADMIN — LEVOTHYROXINE SODIUM 100 MCG: 100 TABLET ORAL at 05:52

## 2017-06-01 RX ADMIN — SODIUM CHLORIDE 75 ML/HR: 900 INJECTION, SOLUTION INTRAVENOUS at 22:05

## 2017-06-01 NOTE — PROGRESS NOTES
[facility] Ron [facility] 79  380 [facility], [address] [place] [address]  [phone]      Medical Progress Note      NAME: Trina Bourgeois   :  1935  MRM:  [id number]    Date/Time: 2017         Subjective:     Chief Complaint:  Patient was seen and examined by me. Chart reviewed. Denied fevers, chills       Objective:       Vitals:       Last 24hrs VS reviewed since prior progress note. Most recent are:    Visit Vitals    /65 (BP 1 Location: Right arm, BP Patient Position: Supine)    Pulse 78    Temp 97.7 °F (36.5 °C)    Resp 18    Ht 5' 9\" (1.753 m)    Wt 73.6 kg (162 lb 4.1 oz)    SpO2 91%    BMI 23.96 kg/m2     SpO2 Readings from Last 6 Encounters:   17 91%   12 99%    O2 Flow Rate (L/min): 2 l/min     Intake/Output Summary (Last 24 hours) at 17 1058  Last data filed at 17 0544   Gross per 24 hour   Intake          2287.08 ml   Output              671 ml   Net          1616.08 ml        Exam:     Physical Exam:    Gen:  Elderly, thin, NAD  HEENT:  Pink conjunctivae, PERRL, hearing intact to voice, moist mucous membranes  Neck:  Supple, without masses, thyroid non-tender  Resp:  No accessory muscle use, clear breath sounds without wheezes rales or rhonchi  Card:  No murmurs, normal S1, S2 without thrills, bruits or peripheral edema  Abd:  Soft, non-tender, non-distended, normoactive bowel sounds are present  Musc:  No cyanosis or clubbing  Skin:  No rashes.   Right foot dressing c/d/i  Neuro:  Cranial nerves 3-12 are grossly intact, follows commands appropriately  Psych:  Good insight, oriented to person, place and time, alert    Medications Reviewed: (see below)    Lab Data Reviewed: (see below)    ______________________________________________________________________    Medications:     Current Facility-Administered Medications   Medication Dose Route Frequency    cefTRIAXone (ROCEPHIN) 2 g in 0.9% sodium chloride (MBP/ADV) 50 mL  2 g IntraVENous Q24H    vancomycin (VANCOCIN) 1,750 mg in 0.9% sodium chloride 500 mL IVPB  1,750 mg IntraVENous ONCE    [START ON 6/2/2017] vancomycin (VANCOCIN) 1,000 mg in 0.9% sodium chloride (MBP/ADV) 250 mL  1,000 mg IntraVENous Q24H    levothyroxine (SYNTHROID) tablet 100 mcg  100 mcg Oral ACB    aspirin (ASPIRIN) tablet 325 mg  325 mg Oral DAILY    atorvastatin (LIPITOR) tablet 10 mg  10 mg Oral QHS    cholecalciferol (VITAMIN D3) tablet 1,000 Units  1,000 Units Oral DAILY    therapeutic multivitamin (THERAGRAN) tablet 1 Tab  1 Tab Oral DAILY    oxyCODONE-acetaminophen (PERCOCET) 5-325 mg per tablet 1 Tab  1 Tab Oral Q6H PRN    sodium chloride (NS) flush 5-10 mL  5-10 mL IntraVENous Q8H    sodium chloride (NS) flush 5-10 mL  5-10 mL IntraVENous PRN    0.9% sodium chloride infusion  75 mL/hr IntraVENous CONTINUOUS          Lab Review:     No results for input(s): WBC, HGB, HCT, PLT, HGBEXT, HCTEXT, PLTEXT in the last 72 hours. Recent Labs      05/31/17   1435   NA  145   K  4.1   CL  109*   CO2  28   GLU  89   BUN  18   CREA  1.52*   CA  9.7   MG  2.0     No results found for: GLUCPOC       Assessment / Plan: Active Problems:    80 yo hx of hypothyroid, neuropathic foot ulcer, s/p right 5th toe amputation. Medicine is following as a consultant    1) Neuropathic foot ulcer/septic joint: s/p right 5th toe amputation. Management of pain control, DVT prophy, rehab, per Ortho. ID is managing IV abx    2) VERONICA: unclear etiology. Unclear baseline Cr. Will check urine lytes.   Cont IVF, monitor BMP    3) Hypothyroid: cont synthroid    4) Hyperlipidemia: cont statin    Total time spent with patient: 30 [address] discussed with: Patient, wife    Discussed:  Care Plan    Prophylaxis:  per team    Disposition:  per team           ___________________________________________________    Attending Physician: Warren Laboy MD

## 2017-06-01 NOTE — PROGRESS NOTES
Post op day 1  patient is alert and oriented  Afebrile vss  Wound id in good cond. Plan:   Iv antibiotics x 6 weeks as recommended by ID

## 2017-06-01 NOTE — INTERDISCIPLINARY ROUNDS
[address]    Patient Information    Name: Ramon Kessler  Age: 81 y.o. Admission Date: 5/31/2017  Surgery/Procedure: Procedure(s):  AMPUTATION RIGHT 5TH TOE (ANKLE BLOCK)  Attending Provider: Lauro Moody MD  Surgeon: Krystal Greenfield   Problem List: Active Problems:    Neuropathy (5/31/2017)      Infection of right foot (5/31/2017)      Unspecified hypothyroidism (5/31/2017)      Hyperlipidemia (5/31/2017)      During rounds the following quality care indicators and evidence based practices were addressed :       PT/OT: Patient mobility - On hold until irrigation completed                       Pain Assessment  Pain Intensity 1: 0 (06/01/17 1286)  Pain Location 1: Foot     Patient Stated Pain Goal: 3    Pain meds: Percocet  Antibiotics: rocephin, vanc  Anticoagulation: ASA daily - preop med    SCDs: in place  Bowels:     RRAT Score:      Readmit Risk Tool  Support Systems: Spouse/Significant Other/Partner  Relationship with Primary Physician Group: Seen at least one time within the past 6 months    Discharge Management/Planning:    Readmit Risk Assessment Information:   Medium Risk            14       Total Score        3 Relationship with PCP    2 Patient Living Status    4 More than 1 Admission in calendar year    5 Patient Insurance is Medicare, Medicaid or Self Pay        Criteria that do not apply:    Patient Length of Stay > 5    Charlson Comorbidity Score         Readmit Risk Tool  Support Systems: Spouse/Significant Other/Partner  Relationship with Primary Physician Group: Seen at least one time within the past 6 months    [address]: need TBD  Rehab/SNF Facility:     Anticipated Date of Discharge: TBD    Interdisciplinary team rounds were held with the following team members: Nurse Practitioner, Case Management, Nursing, Pharmacy, and Rehab. See clinical pathway and/or care plan for interventions and desired outcomes.

## 2017-06-01 NOTE — PROGRESS NOTES
Orders received for NWB ambulation following right trans-metatarsal amputation. Patient currently connected to continuous irrigation system with large amount of drainage produced. PT spoke to nursing and NP. Will await clarification of orders.

## 2017-06-01 NOTE — CONSULTS
[facility]avin Ron [facility] 79   201 [facility], [facility]   0 [facility]       Name:  Lady Nguyen   MR#:  [id number]   :  1935   Account #:  [de-identified]    Date of Consultation:  2017   Date of Adm:  2017       REQUESTING PHYSICIAN: Dr. Camelia Garcia. CHIEF COMPLAINT: Right foot neuropathic ulcer. HISTORY OF PRESENT ILLNESS: The patient is an 76-year-old male   with a history of idiopathic neuropathy, who was admitted by the   Orthopedic Surgery team due to infected right neuropathic ulcer. The   patient has a history of neuropathy, which has been going on for at   least 7 years. Workup for the etiology of neuropathy has been   negative. He does not have a history of diabetes mellitus. He was   taken to the OR by Dr. Camelia Garcia on 2017 where he underwent   a transmetatarsal amputation of the fifth ray of the right foot. Pathology and intraoperative cultures are pending. The Infectious   Disease Service has been asked to assist with antibiotic management. PAST MEDICAL HISTORY: Melanoma, prostate cancer, dyslipidemia,   thyroid disease. PAST SURGICAL HISTORY: Umbilical hernia repair, appendectomy,   benign tumor removal from the thyroid, prostatectomy, tonsillectomy. SOCIAL HISTORY: Significant for occasional alcohol use. No tobacco   or illicit drug use. FAMILY HISTORY: Hypertension, heart disease. ALLERGIES: NO KNOWN DRUG ALLERGIES. OUTPATIENT MEDICATIONS: Please see body of chart for details. He was not on antibiotics prior to admission. REVIEW OF SYSTEMS   As per the HPI. Remainder of 12 system review of systems is   unremarkable. LABORATORY DATA: Creatinine is 1.52. The only other creatinine to   compare this to was in 2012 where the creatinine was 1.0. Cultures from the wound are pending. PHYSICAL EXAMINATION   VITAL SIGNS: Temperature is 97.7 degrees Fahrenheit.  Maximal   temperature is less than 100, heart rate 78 beats per minute, blood   pressure 115/65, oxygen saturation 91% on room air. GENERAL: Alert, no acute distress. HEENT: Normocephalic, atraumatic. Pupils equal and reactive to light. No oropharyngeal lesions noted. CARDIOVASCULAR: Heart regular rate and rhythm. No murmurs,   gallops, or rubs. PULMONARY: Clear to auscultation anteriorly. ABDOMEN: Soft, nontender, nondistended. EXTREMITIES: No clubbing, cyanosis or edema. SKIN: Right foot is bandaged postoperatively. ASSESSMENT AND PLAN:   1. Right foot neuropathic ulcer with septic joint. The patient is status   post right transmetatarsal amputation of the fifth ray on 05/31/2017 by   Dr. Tammy Fam. Will await cultures and pathology and discuss this   further with Orthopedic Surgery. I suspect the patient will need a PICC   line and extended course of intravenous antibiotics, but again we will   discuss this further with the Orthopedic Surgery team. It does not   appear that the patient is currently on any antibiotics. He will be started   on vancomycin and ceftriaxone pending further culture data. 2. Elevated creatinine. Baseline is unknown. Will repeat a CMP today   as well as CBC and CRP for baseline. 3. Neuropathy. This appears to be idiopathic. The patient denies   history of diabetes mellitus. Thank you for allowing me to participate in the care of this patient.         DO MG Brandi Junior   D:  06/01/2017   08:52   T:  06/01/2017   09:17   Job #:  [id number]

## 2017-06-01 NOTE — PROGRESS NOTES
6/1/2017 2:08 PM Home health order received and sent to [facility] along with updates. At [address] were notified planning for discharge on 6/2. CM will follow for final iv abx orders.  MYRNA ContrerasW

## 2017-06-01 NOTE — CDMP QUERY
Dr. Tyron Dixon:    The diagnosis of sepsis has been documented for this patient. Currently, the documentation does not meet criteria for this diagnosis, and may be challenged by an external reviewer. Based on your medical opinion, could you please clarify if this patient is being managed for:    =>Septic joint  =>Other Explanation of clinical findings  =>Unable to Determine (no explanation of clinical findings)    The following is noted in the medical record:    80 yo male PMH idiopathic neuropathy admitted for (R) 5th toe amputation due to ulceration. SIRS criteria on admission: Temp 98.2, HR 58, RR 16. No WBC checked. Wound cultures taken intraoperatively show no growth to date. Patient receiving Rocephin 2 gm IV q24 hours, Vancomycin per PharmD dosing. Infectious Disease MD consulted. Please clarify and document your clinical opinion in the progress notes and discharge summary including the definitive and/or presumptive diagnosis, (suspected or probable), related to the above clinical findings. Please include clinical findings supporting your diagnosis. REFERENCE:    -------------------------  Sepsis is defined as:   Infection   PLUS   2 or more of the following SIRS criteria:    Temp < 96.8°F/36°C or > 100.4°F/38°C   Resps > 20 breaths/min   Pulse > 90 bpm   WBC > 12K or < 4K or Bands > 10%     [facility] [place], [address] [place] [address]  [email or web address]

## 2017-06-01 NOTE — PROGRESS NOTES
Orthopaedic Progress Note  Post Op day: 1 Day Post-Op    2017 1:55 PM     Patient: Malu Santoro MRN: [id number]  SSN: [id number]    YOB: 1935  Age: 81 y.o. Sex: male      Admit date:  2017  Date of Surgery:  2017   Procedures:  Procedure(s):  AMPUTATION RIGHT 5TH TOE (ANKLE BLOCK)  Admitting Physician:  Rhonda Vasquez MD   Surgeon:  Maribell Valadez) and Role:     * Rhonda Vasquez MD - Primary    Consulting Physician(s): Treatment Team: Attending Provider: Rhonda Vasquez MD; Consulting Provider: Carlton Monteiro DO; Care Manager: Deja Bourne; Consulting Provider: Ivan Hunter NP    SUBJECTIVE:     Malu Santoro is a 81 y.o. male is 1 Day Post-Op s/p Procedure(s): AMPUTATION RIGHT 5TH TOE (ANKLE BLOCK) with no c/o post-operative pain. No complaints of nausea, vomiting, dizziness, lightheadedness, chest pain, or shortness of breath. OBJECTIVE:       Physical Exam:  General: Alert, cooperative, no distress. Respiratory: Respirations unlabored  Neurological: Minimal sensation to right foot, PNB for procedure and hx of neuropathy   Dressing/Wound: Dressing saturated from saline irrigation. No significant erythema or swelling. Vital Signs:      Patient Vitals for the past 8 hrs:   BP Temp Pulse Resp SpO2   17 1143 120/62 98.5 °F (36.9 °C) 63 18 98 %   17 0742 115/65 97.7 °F (36.5 °C) 78 18 91 %                                          Temp (24hrs), Av °F (36.7 °C), Min:97.6 °F (36.4 °C), Max:98.5 °F (36.9 °C)      Labs:      No results for input(s): HCT, HGB, INR, HCTEXT, HGBEXT in the last 72 hours.     No lab exists for component: INREXT  Lab Results   Component Value Date/Time    Sodium 145 2017 02:35 PM    Potassium 4.1 2017 02:35 PM    Chloride 109 2017 02:35 PM    CO2 28 2017 02:35 PM    Glucose 89 2017 02:35 PM    BUN 18 2017 02:35 PM    Creatinine 1.52 2017 02:35 PM    Calcium 9.7 2017 02:35 PM       PT/OT:                Patient mobility                         ASSESSMENT / PLAN:   Active Problems:    Neuropathy (5/31/2017)      Infection of right foot (5/31/2017)      Unspecified hypothyroidism (5/31/2017)      Hyperlipidemia (5/31/2017)         S/P AMPUTATION RIGHT 5TH TOE (ANKLE BLOCK)/septic arthritis PT/OT/Rehab. Irrigation completed, HV drain attached with full charge. Will need 6 weeks of IV antibiotics. Patient understands and agrees with plan of care. Pain Continue current regimen   Discharge Disposition Home w/ HH and IV antibiotics. Patient seen and evaluated by Dr Sultana Parnell who agrees with the findings and treatment plan as outlined above.     Signed By: Yesika Willis, NP    RN, MSN, FNP-BC  Orthopedic Nurse Practitioner  Keegan Lyons

## 2017-06-01 NOTE — PROGRESS NOTES
Verbal shift change report given to Daron Perkins (oncoming nurse) by HECTOR Hurtado (offgoing nurse). Report given with SBAR, Kardex and MAR.

## 2017-06-01 NOTE — PROGRESS NOTES
[facility] Dosing Services: Antimicrobial Stewardship Progress Note    Consult for antibiotic dosing of Vancomycin by Dr. Rand Krishnamurthy  Pharmacist reviewed antibiotic appropriateness for 81year old , male  for indication of bone and joint infection  Day of Therapy: 1    Plan:  Vancomycin therapy:  Start Vancomycin therapy, with loading dose of 1,750 mg IV at 0900 on 6/1/2017. Follow with maintenance dose of 1,000 mg IV at 0900 on 6/2/2017, every 24 hours. Dose calculated to approximate a therapeutic trough of 15-20 mcg/mL. Plan for level: Trough prior to third dose  Pharmacist will follow daily and will make changes to dose and/or frequency based on clinical status. Other Antimicrobial  (not dosed by pharmacist)   Ceftriaxone   Cultures      5/31/2017 right foot wound - no organisms on gram stain; culture is pending   5/31/2017 right foot would for anaerobes - in process   Serum Creatinine     Lab Results   Component Value Date/Time    Creatinine 1.52 05/31/2017 02:35 PM       Creatinine Clearance Estimated Creatinine Clearance: 38.1 mL/min (based on Cr of 1.52).      Temp   97.7 °F (36.5 °C)    WBC   Lab Results   Component Value Date/Time    WBC 9.7 01/27/2012 11:15 AM       H/H   Lab Results   Component Value Date/Time    HGB 12.9 01/27/2012 11:15 AM        Platelets   Lab Results   Component Value Date/Time    PLATELET 960 00/93/7021 11:15 AM        [facility], Pharmacist

## 2017-06-01 NOTE — PROGRESS NOTES
Problem: Mobility Impaired (Adult and Pediatric)  Goal: *Acute Goals and Plan of Care (Insert Text)  Physical Therapy Goals  Initiated 6/1/2017  1. Patient will move from supine to sit and sit to supine in bed with modified independence within 7 day(s). 2. Patient will transfer from bed to chair and chair to bed with modified independence using the least restrictive device within 7 day(s). 3. Patient will perform sit to stand with modified independence within 7 day(s). 4. Patient will ambulate with minimal assistance/contact guard assist for 20 feet with the least restrictive device within 7 day(s). 5. Patient will ascend/descend 4 stairs with 1 handrail(s) with minimal assistance/contact guard assist within 7 day(s). PHYSICAL THERAPY EVALUATION  Patient: Che Johnson (52 y.o. male)  Date: 6/1/2017  Primary Diagnosis: NEUROPATHIC ULCER RIGHT FOOT   Infection of right foot  Diabetes 1.5, managed as type 2 (HCC)  Neuropathy  Procedure(s) (LRB):  AMPUTATION RIGHT 5TH TOE (ANKLE BLOCK) (Right) 1 Day Post-Op   Precautions:   NWB, Fall      ASSESSMENT :  Based on the objective data described below, the patient presents with generalized weakness, decreased bed mobility, decreased transfers and functional ambulation, decreased dynamic balance and increased risk for falls following admission for amputation to right 5th toe. Patient now with dressing and hemovac in tact to right foot. Wife present during PT. Patient was able to stand and transfer to recliner using rolling walker +2 moderate assist. He has some difficulty maintaining NWB status to RLE and gets very shaky. Needs constant cues for safety and sequence. Vitals stable. Patient should have assistance of 2 at all times for safety. Will have to improve significantly to be able to return home with his wife. .     Patient will benefit from skilled intervention to address the above impairments.   Patients rehabilitation potential is considered to be Good  Factors which may influence rehabilitation potential include:   [ ]         None noted  [ ]         Mental ability/status  [X]         Medical condition  [X]         Home/family situation and support systems  [ ]         Safety awareness  [ ]         Pain tolerance/management  [ ]         Other:        PLAN :  Recommendations and Planned Interventions:  [X]           Bed Mobility Training             [ ]    Neuromuscular Re-Education  [X]           Transfer Training                   [ ]    Orthotic/Prosthetic Training  [X]           Gait Training                         [ ]    Modalities  [ ]           Therapeutic Exercises           [ ]    Edema Management/Control  [ ]           Therapeutic Activities            [ ]    Patient and Family Training/Education  [ ]           Other (comment):     Frequency/Duration: Patient will be followed by physical therapy  daily to address goals. Discharge Recommendations: Rehab versus Home Health  Further Equipment Recommendations for Discharge: none; has most DME but may need wheelchair is NWB for extended time. SUBJECTIVE:   Patient stated You are not kidding; this is hard! Herbert Ramirez      OBJECTIVE DATA SUMMARY:   HISTORY:    Past Medical History:   Diagnosis Date    Cancer ([facility] [address] 2009     MELANOMA,   PROSTATE    Hyperlipidemia      Thyroid disease       Past Surgical History:   Procedure Laterality Date    HX ABDOMINAL WALL DEFECT REPAIR   8263     UMBILICAL    HX APPENDECTOMY        HX OTHER SURGICAL   2002     BENIGN TUMOR THYROID    HX PROSTATECTOMY   2009    HX TONSILLECTOMY         Prior Level of Function/Home Situation: used a walker most of time; cane at times.    Personal factors and/or comorbidities impacting plan of care:      Home Situation  Home Environment: Private residence  # Steps to Enter: 4  Rails to Enter: Yes  One/Two Story Residence: Two story, live on 1st floor  Lift Chair Available: No  Living Alone: No  Support Systems: Spouse/Significant Other/Partner  Patient Expects to be Discharged to[de-identified] Private residence  Current DME Used/Available at Home: Freddy Jennifer, quad, Walker, rolling, Shower chair     EXAMINATION/PRESENTATION/DECISION MAKING:   Critical Behavior:  Neurologic State: Alert  Orientation Level: Oriented X4     Safety/Judgement: Insight into deficits, Good awareness of safety precautions  Hearing: Auditory  Auditory Impairment: None  Skin:  Not fully observed     Range Of Motion:  AROM: Within functional limits (RLE not tested)           PROM:  (RLE not tested)           Strength:    Strength: Generally decreased, functional                    Tone & Sensation:   Tone: Normal              Sensation: Impaired (has history of neuropathy)                        Functional Mobility:  Bed Mobility:     Supine to Sit: Additional time; Moderate assistance        Transfers:  Sit to Stand: Additional time;Assist x2; Moderate assistance  Stand to Sit: Assist x2; Moderate assistance        Bed to Chair: Assist x2; Moderate assistance              Balance:   Sitting: Intact  Standing: Impaired  Standing - Static: Constant support  Standing - Dynamic : Fair  Ambulation/Gait Training:  Distance (ft): 3 Feet (ft)  Assistive Device: Gait belt;Walker, rolling  Ambulation - Level of Assistance: Moderate assistance;Assist x2; Additional time        Gait Abnormalities: Decreased step clearance  Right Side Weight Bearing: Non-weight bearing           Speed/Vernell: Pace decreased (<100 feet/min)  Step Length: Left shortened                                                                           Functional Measure:  Barthel Index:      Bathin  Bladder: 10  Bowels: 10  Groomin  Dressin  Feeding: 10  Mobility: 0  Stairs: 0  Toilet Use: 0  Transfer (Bed to Chair and Back): 5  Total: 35         Barthel and G-code impairment scale:  Percentage of impairment CH  0% CI  1-19% CJ  20-39% CK  40-59% CL  60-79% CM  80-99% CN  100%   Barthel Score 0-100 100 99-80 79-60 59-40 20-39 1-19    0   Barthel Score 0-20 20 17-19 13-16 9-12 5-8 1-4 0      The Barthel ADL Index: Guidelines  1. The index should be used as a record of what a patient does, not as a record of what a patient could do. 2. The main aim is to establish degree of independence from any help, physical or verbal, however minor and for whatever reason. 3. The need for supervision renders the patient not independent. 4. A patient's performance should be established using the best available evidence. Asking the patient, friends/relatives and nurses are the usual sources, but direct observation and common sense are also important. However direct testing is not needed. 5. Usually the patient's performance over the preceding 24-48 hours is important, but occasionally longer periods will be relevant. 6. Middle categories imply that the patient supplies over 50 per cent of the effort. 7. Use of aids to be independent is allowed. Marissa Gonzalez., Barthel, D.W. (7557). Functional evaluation: the Barthel Index. [address] [facility] (14)2. JUSTIN Torrez, Amadeo Simons., Anthony Yuma., [place], [address] [place] [address] (1999). Measuring the change indisability after inpatient rehabilitation; comparison of the responsiveness of the Barthel Index and Functional [place] Measure. Journal of Neurology, Neurosurgery, and Psychiatry, 66(4), 004-300. Clement Zaidi NHAYDER.AARON, ADEN Schofield.ALPHONSE, & Ren Olivera, M.A. (2004.) Assessment of post-stroke quality of life in cost-effectiveness studies: The usefulness of the Barthel Index and the EuroQoL-5D.  Quality of Life Research, 15, 923-57               Physical Therapy Evaluation Charge Determination   History Examination Presentation Decision-Making   MEDIUM  Complexity : 1-2 comorbidities / personal factors will impact the outcome/ POC  LOW Complexity : 1-2 Standardized tests and measures addressing body structure, function, activity limitation and / or participation in recreation  MEDIUM Complexity : Evolving with changing characteristics  LOW Complexity : FOTO score of       Based on the above components, the patient evaluation is determined to be of the following complexity level: LOW      Pain:  Pain Scale 1: Numeric (0 - 10)  Pain Intensity 1: 0              Activity Tolerance:   Gets shaky when up; difficulty maintaining NWB status  Please refer to the flowsheet for vital signs taken during this treatment. After treatment:   [X]         Patient left in no apparent distress sitting up in chair  [ ]         Patient left in no apparent distress in bed  [X]         Call bell left within reach  [X]         Nursing notified  [X]         Caregiver present  [ ]         Bed alarm activated      COMMUNICATION/EDUCATION:   The patients plan of care was discussed with: Registered Nurse.  [X]         Fall prevention education was provided and the patient/caregiver indicated understanding. [ ]         Patient/family have participated as able in goal setting and plan of care. [X]         Patient/family agree to work toward stated goals and plan of care. [ ]         Patient understands intent and goals of therapy, but is neutral about his/her participation. [ ]         Patient is unable to participate in goal setting and plan of care.      Thank you for this referral.  David Moncada, PT   Time Calculation: 26 mins

## 2017-06-01 NOTE — PROGRESS NOTES
Bedside shift change report given to Tj Whitman (oncoming nurse) by Doug Yin (offgoing nurse). Report included the following information SBAR, Kardex, Procedure Summary, MAR and Recent Results.

## 2017-06-01 NOTE — PROGRESS NOTES
Bedside shift change report given to Leah (oncoming nurse) by Chantal Reynoso (offgoing nurse). Report included the following information SBAR, Kardex, OR Summary, Intake/Output and MAR.

## 2017-06-01 NOTE — PROGRESS NOTES
Primary Nurse Sherri Stallings RN and Alverda Sicard, RN performed a dual skin assessment on this patient No impairment noted  Jovany score is 21

## 2017-06-02 LAB
ALBUMIN SERPL BCP-MCNC: 3.2 G/DL (ref 3.5–5)
ALBUMIN/GLOB SERPL: 1 {RATIO} (ref 1.1–2.2)
ALP SERPL-CCNC: 56 U/L (ref 45–117)
ALT SERPL-CCNC: 13 U/L (ref 12–78)
ANION GAP BLD CALC-SCNC: 10 MMOL/L (ref 5–15)
APPEARANCE UR: CLEAR
APTT PPP: 37.6 SEC (ref 22.1–32.5)
AST SERPL W P-5'-P-CCNC: 23 U/L (ref 15–37)
BACTERIA URNS QL MICRO: NEGATIVE /HPF
BASOPHILS # BLD AUTO: 0 K/UL (ref 0–0.1)
BASOPHILS # BLD AUTO: 0 K/UL (ref 0–0.1)
BASOPHILS # BLD: 0 % (ref 0–1)
BASOPHILS # BLD: 0 % (ref 0–1)
BILIRUB SERPL-MCNC: 0.7 MG/DL (ref 0.2–1)
BILIRUB UR QL: NEGATIVE
BUN SERPL-MCNC: 14 MG/DL (ref 6–20)
BUN/CREAT SERPL: 9 (ref 12–20)
CALCIUM SERPL-MCNC: 9 MG/DL (ref 8.5–10.1)
CHLORIDE SERPL-SCNC: 108 MMOL/L (ref 97–108)
CO2 SERPL-SCNC: 22 MMOL/L (ref 21–32)
COLOR UR: ABNORMAL
CREAT SERPL-MCNC: 1.48 MG/DL (ref 0.7–1.3)
CREAT UR-MCNC: 33.89 MG/DL
CRP SERPL-MCNC: 5.79 MG/DL
DIFFERENTIAL METHOD BLD: ABNORMAL
DIFFERENTIAL METHOD BLD: ABNORMAL
EOSINOPHIL # BLD: 0 K/UL (ref 0–0.4)
EOSINOPHIL # BLD: 0 K/UL (ref 0–0.4)
EOSINOPHIL NFR BLD: 0 % (ref 0–7)
EOSINOPHIL NFR BLD: 0 % (ref 0–7)
EPITH CASTS URNS QL MICRO: ABNORMAL /LPF
ERYTHROCYTE [DISTWIDTH] IN BLOOD BY AUTOMATED COUNT: 14.2 % (ref 11.5–14.5)
ERYTHROCYTE [DISTWIDTH] IN BLOOD BY AUTOMATED COUNT: 14.3 % (ref 11.5–14.5)
GLOBULIN SER CALC-MCNC: 3.3 G/DL (ref 2–4)
GLUCOSE SERPL-MCNC: 95 MG/DL (ref 65–100)
GLUCOSE UR STRIP.AUTO-MCNC: NEGATIVE MG/DL
HAPTOGLOB SERPL-MCNC: 79 MG/DL (ref 30–200)
HCT VFR BLD AUTO: 33.4 % (ref 36.6–50.3)
HCT VFR BLD AUTO: 33.7 % (ref 36.6–50.3)
HGB BLD-MCNC: 11 G/DL (ref 12.1–17)
HGB BLD-MCNC: 11.1 G/DL (ref 12.1–17)
HGB UR QL STRIP: ABNORMAL
HYALINE CASTS URNS QL MICRO: ABNORMAL /LPF (ref 0–5)
INR PPP: 1.2 (ref 0.9–1.1)
KETONES UR QL STRIP.AUTO: NEGATIVE MG/DL
LDH SERPL L TO P-CCNC: 186 U/L (ref 85–241)
LEUKOCYTE ESTERASE UR QL STRIP.AUTO: NEGATIVE
LYMPHOCYTES # BLD AUTO: 5 % (ref 12–49)
LYMPHOCYTES # BLD AUTO: 7 % (ref 12–49)
LYMPHOCYTES # BLD: 1.1 K/UL (ref 0.8–3.5)
LYMPHOCYTES # BLD: 1.6 K/UL (ref 0.8–3.5)
MAGNESIUM SERPL-MCNC: 1.8 MG/DL (ref 1.6–2.4)
MCH RBC QN AUTO: 30.5 PG (ref 26–34)
MCH RBC QN AUTO: 30.5 PG (ref 26–34)
MCHC RBC AUTO-ENTMCNC: 32.9 G/DL (ref 30–36.5)
MCHC RBC AUTO-ENTMCNC: 32.9 G/DL (ref 30–36.5)
MCV RBC AUTO: 92.5 FL (ref 80–99)
MCV RBC AUTO: 92.6 FL (ref 80–99)
MONOCYTES # BLD: 8.8 K/UL (ref 0–1)
MONOCYTES # BLD: 9.7 K/UL (ref 0–1)
MONOCYTES NFR BLD AUTO: 39 % (ref 5–13)
MONOCYTES NFR BLD AUTO: 44 % (ref 5–13)
NEUTS BAND NFR BLD MANUAL: 1 % (ref 0–6)
NEUTS BAND NFR BLD MANUAL: 4 % (ref 0–6)
NEUTS SEG # BLD: 11.3 K/UL (ref 1.8–8)
NEUTS SEG # BLD: 12.1 K/UL (ref 1.8–8)
NEUTS SEG NFR BLD AUTO: 50 % (ref 32–75)
NEUTS SEG NFR BLD AUTO: 50 % (ref 32–75)
NITRITE UR QL STRIP.AUTO: NEGATIVE
PERIPHERAL SMEAR,PSM: NORMAL
PH UR STRIP: 6 [PH] (ref 5–8)
PHOSPHATE SERPL-MCNC: 2.9 MG/DL (ref 2.6–4.7)
PLATELET # BLD AUTO: 58 K/UL (ref 150–400)
PLATELET # BLD AUTO: 60 K/UL (ref 150–400)
POTASSIUM SERPL-SCNC: 3.6 MMOL/L (ref 3.5–5.1)
PROT SERPL-MCNC: 6.5 G/DL (ref 6.4–8.2)
PROT UR STRIP-MCNC: NEGATIVE MG/DL
PROTHROMBIN TIME: 12.4 SEC (ref 9–11.1)
RBC # BLD AUTO: 3.61 M/UL (ref 4.1–5.7)
RBC # BLD AUTO: 3.64 M/UL (ref 4.1–5.7)
RBC #/AREA URNS HPF: ABNORMAL /HPF (ref 0–5)
RBC MORPH BLD: ABNORMAL
RBC MORPH BLD: ABNORMAL
SODIUM SERPL-SCNC: 140 MMOL/L (ref 136–145)
SODIUM UR-SCNC: 97 MMOL/L
SP GR UR REFRACTOMETRY: 1.01 (ref 1–1.03)
THERAPEUTIC RANGE,PTTT: ABNORMAL SECS (ref 58–77)
UROBILINOGEN UR QL STRIP.AUTO: 0.2 EU/DL (ref 0.2–1)
WBC # BLD AUTO: 22.1 K/UL (ref 4.1–11.1)
WBC # BLD AUTO: 22.5 K/UL (ref 4.1–11.1)
WBC OTHER # BLD MANUAL: 0 10*3/UL
WBC URNS QL MICRO: ABNORMAL /HPF (ref 0–4)

## 2017-06-02 PROCEDURE — 74011250636 HC RX REV CODE- 250/636: Performed by: INTERNAL MEDICINE

## 2017-06-02 PROCEDURE — 84100 ASSAY OF PHOSPHORUS: CPT | Performed by: INTERNAL MEDICINE

## 2017-06-02 PROCEDURE — 97116 GAIT TRAINING THERAPY: CPT

## 2017-06-02 PROCEDURE — 80053 COMPREHEN METABOLIC PANEL: CPT | Performed by: INTERNAL MEDICINE

## 2017-06-02 PROCEDURE — 74011000258 HC RX REV CODE- 258: Performed by: INTERNAL MEDICINE

## 2017-06-02 PROCEDURE — 85610 PROTHROMBIN TIME: CPT | Performed by: INTERNAL MEDICINE

## 2017-06-02 PROCEDURE — 86140 C-REACTIVE PROTEIN: CPT | Performed by: INTERNAL MEDICINE

## 2017-06-02 PROCEDURE — 85025 COMPLETE CBC W/AUTO DIFF WBC: CPT | Performed by: INTERNAL MEDICINE

## 2017-06-02 PROCEDURE — 97530 THERAPEUTIC ACTIVITIES: CPT

## 2017-06-02 PROCEDURE — 36415 COLL VENOUS BLD VENIPUNCTURE: CPT | Performed by: INTERNAL MEDICINE

## 2017-06-02 PROCEDURE — 83010 ASSAY OF HAPTOGLOBIN QUANT: CPT | Performed by: INTERNAL MEDICINE

## 2017-06-02 PROCEDURE — 83735 ASSAY OF MAGNESIUM: CPT | Performed by: INTERNAL MEDICINE

## 2017-06-02 PROCEDURE — 85730 THROMBOPLASTIN TIME PARTIAL: CPT | Performed by: INTERNAL MEDICINE

## 2017-06-02 PROCEDURE — 74011250637 HC RX REV CODE- 250/637: Performed by: ORTHOPAEDIC SURGERY

## 2017-06-02 PROCEDURE — 65270000029 HC RM PRIVATE

## 2017-06-02 PROCEDURE — 83615 LACTATE (LD) (LDH) ENZYME: CPT | Performed by: INTERNAL MEDICINE

## 2017-06-02 RX ADMIN — SODIUM CHLORIDE 75 ML/HR: 900 INJECTION, SOLUTION INTRAVENOUS at 13:19

## 2017-06-02 RX ADMIN — VANCOMYCIN HYDROCHLORIDE 1000 MG: 1 INJECTION, POWDER, LYOPHILIZED, FOR SOLUTION INTRAVENOUS at 10:27

## 2017-06-02 RX ADMIN — ATORVASTATIN CALCIUM 10 MG: 10 TABLET, FILM COATED ORAL at 22:35

## 2017-06-02 RX ADMIN — Medication 10 ML: at 06:00

## 2017-06-02 RX ADMIN — Medication 10 ML: at 13:19

## 2017-06-02 RX ADMIN — VITAMIN D, TAB 1000IU (100/BT) 1000 UNITS: 25 TAB at 08:56

## 2017-06-02 RX ADMIN — SODIUM CHLORIDE 75 ML/HR: 900 INJECTION, SOLUTION INTRAVENOUS at 20:30

## 2017-06-02 RX ADMIN — CEFTRIAXONE 2 G: 2 INJECTION, POWDER, FOR SOLUTION INTRAMUSCULAR; INTRAVENOUS at 08:56

## 2017-06-02 RX ADMIN — THERA TABS 1 TABLET: TAB at 08:56

## 2017-06-02 RX ADMIN — ASPIRIN 325 MG: 325 TABLET, COATED ORAL at 08:56

## 2017-06-02 RX ADMIN — LEVOTHYROXINE SODIUM 100 MCG: 100 TABLET ORAL at 07:11

## 2017-06-02 NOTE — PROGRESS NOTES
[facility] Infectious Diseases Progress Note  Sudarshan Belle MD,             Carlo Bray MD,          Burnice Heal DO     [address] [place] [address]    [address], [address]  [phone]  Fax    2017      Assessment & Plan:   1. Right foot neuropathic ulcer with septic joint. S/p right transmetatarsal amputation of the fifth ray on 2017 by  Dr. Laura Nelson. Per ortho will need 6 weeks IV abx. Will await cultures to finalize abx plans. 2. Elevated creatinine. Baseline is unknown. Suspect CKD. Follow  3. Leukocytosis. Stable. Follow  4. Neuropathy. This appears to be idiopathic. The patient denies history of diabetes mellitus. Subjective:     No complaints    Objective:     Vitals:   Visit Vitals    /50 (BP 1 Location: Left arm, BP Patient Position: At rest)    Pulse 63    Temp 98.2 °F (36.8 °C)    Resp 18    Ht 5' 9\" (1.753 m)    Wt 73.6 kg (162 lb 4.1 oz)    SpO2 96%    BMI 23.96 kg/m2        Tmax:  Temp (24hrs), Av.4 °F (36.9 °C), Min:98.2 °F (36.8 °C), Max:98.6 °F (37 °C)      Exam:   Patient is intubated:  no    Physical Examination:   GENERAL ASSESSMENT: NAD  HEENT:Nontraumatic NCAT  CHEST: no distress  SKIN:  No rash  ABDOMEN:   :Vazquez: no  EXTREMITY: Foot dressed  NEURO:Grossly non focal    Labs:        No lab exists for component: ITNL   No results for input(s): CPK, CKMB, TROIQ in the last 72 hours.   Recent Labs      17   0807  17   0215  17   1435   NA   --   140  145   K   --   3.6  4.1   CL   --   108  109*   CO2   --   22  28   BUN   --   14  18   CREA   --   1.48*  1.52*   GLU   --   95  89   PHOS   --   2.9   --    MG   --   1.8  2.0   ALB   --   3.2*   --    WBC  22.1*  22.5*   --    HGB  11.0*  11.1*   --    HCT  33.4*  33.7*   --    PLT  60*  58*   --      Recent Labs      17   0809   INR  1.2*   PTP  12.4*   APTT  37.6*     Needs: urine analysis, urine sodium, protein and creatinine  No results found for: GARLAND, CREAU      Cultures:     No results found for: SDES  Lab Results   Component Value Date/Time    Culture result: NO GROWTH AFTER 19 HOURS 05/31/2017 11:41 AM       Radiology:     Medications       Current Facility-Administered Medications   Medication Dose Route Frequency Last Dose    cefTRIAXone (ROCEPHIN) 2 g in 0.9% sodium chloride (MBP/ADV) 50 mL  2 g IntraVENous Q24H 2 g at 06/02/17 0856    vancomycin (VANCOCIN) 1,000 mg in 0.9% sodium chloride (MBP/ADV) 250 mL  1,000 mg IntraVENous Q24H      levothyroxine (SYNTHROID) tablet 100 mcg  100 mcg Oral  mcg at 06/02/17 0711    aspirin (ASPIRIN) tablet 325 mg  325 mg Oral DAILY 325 mg at 06/02/17 0856    atorvastatin (LIPITOR) tablet 10 mg  10 mg Oral QHS 10 mg at 06/01/17 2202    cholecalciferol (VITAMIN D3) tablet 1,000 Units  1,000 Units Oral DAILY 1,000 Units at 06/02/17 0856    therapeutic multivitamin [facility]) tablet 1 Tab  1 Tab Oral DAILY 1 Tab at 06/02/17 0856    oxyCODONE-acetaminophen (PERCOCET) 5-325 mg per tablet 1 Tab  1 Tab Oral Q6H PRN      sodium chloride (NS) flush 5-10 mL  5-10 mL IntraVENous Q8H 10 mL at 06/02/17 0600    sodium chloride (NS) flush 5-10 mL  5-10 mL IntraVENous PRN      0.9% sodium chloride infusion  75 mL/hr IntraVENous CONTINUOUS 75 mL/hr at 06/01/17 2205           Case discussed with: patient, wife, and CM      Sia Restrepo DO

## 2017-06-02 NOTE — PROGRESS NOTES
Problem: Mobility Impaired (Adult and Pediatric)  Goal: *Acute Goals and Plan of Care (Insert Text)  Physical Therapy Goals  Initiated 6/1/2017  1. Patient will move from supine to sit and sit to supine in bed with modified independence within 7 day(s). 2. Patient will transfer from bed to chair and chair to bed with modified independence using the least restrictive device within 7 day(s). 3. Patient will perform sit to stand with modified independence within 7 day(s). 4. Patient will ambulate with minimal assistance/contact guard assist for 20 feet with the least restrictive device within 7 day(s). 5. Patient will ascend/descend 4 stairs with 1 handrail(s) with minimal assistance/contact guard assist within 7 day(s). PHYSICAL THERAPY TREATMENT  Patient: Katya Ruano (36 y.o. male)  Date: 6/2/2017  Diagnosis: NEUROPATHIC ULCER RIGHT FOOT   Infection of right foot  Diabetes 1.5, managed as type 2 (HCC)  Neuropathy <principal problem not specified>  Procedure(s) (LRB):  AMPUTATION RIGHT 5TH TOE (ANKLE BLOCK) (Right) 2 Days Post-Op  Precautions: NWB, Fall  Chart, physical therapy assessment, plan of care and goals were reviewed. ASSESSMENT:  Order received for off load shoe on right foot. Pt and wife instructed on use of shoe. Pt CGA to stand. Pt ambulated 90ft with RW CGA with off load shoe on right. Pts balance good. Pt and wife pleased at his progression with mobility. Pt should be safe for D/C home after after being cleared on steps tomorrow. Continue goals. .  Progression toward goals:  [X]      Improving appropriately and progressing toward goals  [ ]      Improving slowly and progressing toward goals  [ ]      Not making progress toward goals and plan of care will be adjusted       PLAN:  Patient continues to benefit from skilled intervention to address the above impairments. Continue treatment per established plan of care.   Discharge Recommendations:  Home Health  Further Equipment Recommendations for Discharge:  rolling walker       SUBJECTIVE:          OBJECTIVE DATA SUMMARY:   Critical Behavior:  Neurologic State: Alert, Appropriate for age  Orientation Level: Oriented X4     Safety/Judgement: Insight into deficits, Good awareness of safety precautions  Functional Mobility Training:  Bed Mobility:     Supine to Sit: Contact guard assistance                          Transfers:  Sit to Stand: Contact guard assistance  Stand to Sit: Contact guard assistance                             Balance:  Sitting: Intact  Standing: Intact; With support  Ambulation/Gait Training:  Distance (ft): 90 Feet (ft)  Assistive Device: Walker, rolling;Gait belt  Ambulation - Level of Assistance: Contact guard assistance        Gait Abnormalities: Decreased step clearance  Right Side Weight Bearing: Non-weight bearing     Base of Support: Narrowed        Step Length: Left shortened;Right shortened                          Activity Tolerance:   Pt tolerated treatment well. Please refer to the flowsheet for vital signs taken during this treatment.   After treatment:   [X] Patient left in no apparent distress sitting up in chair  [ ] Patient left in no apparent distress in bed  [ ] Call bell left within reach  [ ] Nursing notified  [ ] Caregiver present  [ ] Bed alarm activated      COMMUNICATION/COLLABORATION:   The patients plan of care was discussed with: Physical Therapist     Eliza Briones PTA   Time Calculation: 24 mins

## 2017-06-02 NOTE — PROGRESS NOTES
6/2/2017 9:29 AM [facility] notified pt will not discharge home today, cultures pending. No plan for discharge over the weekend. CM will follow up on Monday.  МАРИЯ Hardin

## 2017-06-02 NOTE — PROGRESS NOTES
Bedside shift change report given to Muna Cyr (oncoming nurse) by Maricruz Escalante (offgoing nurse). Report included the following information SBAR, Kardex, Procedure Summary, MAR and Recent Results.

## 2017-06-02 NOTE — PROGRESS NOTES
Problem: Mobility Impaired (Adult and Pediatric)  Goal: *Acute Goals and Plan of Care (Insert Text)  Physical Therapy Goals  Initiated 6/1/2017  1. Patient will move from supine to sit and sit to supine in bed with modified independence within 7 day(s). 2. Patient will transfer from bed to chair and chair to bed with modified independence using the least restrictive device within 7 day(s). 3. Patient will perform sit to stand with modified independence within 7 day(s). 4. Patient will ambulate with minimal assistance/contact guard assist for 20 feet with the least restrictive device within 7 day(s). 5. Patient will ascend/descend 4 stairs with 1 handrail(s) with minimal assistance/contact guard assist within 7 day(s). PHYSICAL THERAPY TREATMENT  Patient: Sujatha Michaud (2 y.o. male)  Date: 6/2/2017  Diagnosis: NEUROPATHIC ULCER RIGHT FOOT   Infection of right foot  Diabetes 1.5, managed as type 2 (HCC)  Neuropathy <principal problem not specified>  Procedure(s) (LRB):  AMPUTATION RIGHT 5TH TOE (ANKLE BLOCK) (Right) 2 Days Post-Op  Precautions: NWB, Fall  Chart, physical therapy assessment, plan of care and goals were reviewed. ASSESSMENT:  Pt CGA to sit. Pt min assist to stand. Pt able to maintain NWB in standing. Pt ambulated 5ft to chair with RW min to mod assist of 2. Pt with increased instability ambulating struggling to maintain NWB on right. Pt left sitting. Pt is at risk for falls. Pt would benefit from off load shoe if allowed by doctor. This would stabilize gait. PT agrees with pt wifes concerns about assisting pt at home at current level. Continue goals. Progression toward goals:  [ ]      Improving appropriately and progressing toward goals  [X]      Improving slowly and progressing toward goals  [ ]      Not making progress toward goals and plan of care will be adjusted       PLAN:  Patient continues to benefit from skilled intervention to address the above impairments.   Continue treatment per established plan of care. Discharge Recommendations:  Home Health vs  SNF   Further Equipment Recommendations for Discharge:  rolling walker       SUBJECTIVE:          OBJECTIVE DATA SUMMARY:   Critical Behavior:  Neurologic State: Alert, Appropriate for age  Orientation Level: Oriented X4     Safety/Judgement: Insight into deficits, Good awareness of safety precautions  Functional Mobility Training:  Bed Mobility:     Supine to Sit: Contact guard assistance                          Transfers:      Pt CGA sit to stand. Balance:  Sitting: Intact  Standing: With support  Ambulation/Gait Training:  Distance (ft): 5 Feet (ft)  Assistive Device: Walker, rolling;Gait belt  Ambulation - Level of Assistance: Minimal assistance; Moderate assistance;Assist x2        Gait Abnormalities: Antalgic;Decreased step clearance  Right Side Weight Bearing: Non-weight bearing     Base of Support: Narrowed        Step Length: Left shortened;Right lengthened                    Activity Tolerance:   Pt tolerated treatment well  Please refer to the flowsheet for vital signs taken during this treatment.   After treatment:   [X] Patient left in no apparent distress sitting up in chair  [ ] Patient left in no apparent distress in bed  [ ] Call bell left within reach  [ ] Nursing notified  [ ] Caregiver present  [ ] Bed alarm activated      COMMUNICATION/COLLABORATION:   The patients plan of care was discussed with: Physical Therapist     Rachel Groves PTA   Time Calculation: 23 mins

## 2017-06-02 NOTE — PROGRESS NOTES
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 He has chronic thrombocytopenia and leukocytosis secondary to this. He was admitted for right foot neuropathic ulcer with septic joint. S/p right transmetatarsal amputation of the fifth  on 2017. Per ortho will need 6 weeks IV abx. He reports that he is feeling good and has been up walking. Denies shortness of breath, bleeding. Objective:     Patient Vitals for the past 8 hrs:   BP Temp Pulse Resp SpO2   17 1514 117/56 98.7 °F (37.1 °C) 68 19 99 %   17 1123 132/65 98.7 °F (37.1 °C) 66 18 99 %   17 0823 123/65 98.6 °F (37 °C) 67 18 98 %       Temp (24hrs), Av.5 °F (36.9 °C), Min:98.2 °F (36.8 °C), Max:98.7 °F (37.1 °C)           Physical Exam:  Alert, conversant  Lungs: clear to auscultation  Cardiovascular: regular  Extremities: no edema  Skin: no petechiae    Labs:    Recent Results (from the past 24 hour(s))   CBC WITH AUTOMATED DIFF    Collection Time: 17  2:15 AM   Result Value Ref Range    WBC 22.5 (H) 4.1 - 11.1 K/uL    RBC 3.64 (L) 4.10 - 5.70 M/uL    HGB 11.1 (L) 12.1 - 17.0 g/dL    HCT 33.7 (L) 36.6 - 50.3 %    MCV 92.6 80.0 - 99.0 FL    MCH 30.5 26.0 - 34.0 PG    MCHC 32.9 30.0 - 36.5 g/dL    RDW 14.3 11.5 - 14.5 %    PLATELET 58 (L) 527 - 400 K/uL    NEUTROPHILS 50 32 - 75 %    BAND NEUTROPHILS 4 0 - 6 %    LYMPHOCYTES 7 (L) 12 - 49 %    MONOCYTES 39 (H) 5 - 13 %    EOSINOPHILS 0 0 - 7 %    BASOPHILS 0 0 - 1 %    ABS. NEUTROPHILS 12.1 (H) 1.8 - 8.0 K/UL    ABS. LYMPHOCYTES 1.6 0.8 - 3.5 K/UL    ABS. MONOCYTES 8.8 (H) 0.0 - 1.0 K/UL    ABS. EOSINOPHILS 0.0 0.0 - 0.4 K/UL    ABS.  BASOPHILS 0.0 0.0 - 0.1 K/UL    DF MANUAL      RBC COMMENTS NORMOCYTIC, NORMOCHROMIC     METABOLIC PANEL, COMPREHENSIVE    Collection Time: 17  2:15 AM   Result Value Ref Range    Sodium 140 136 - 145 mmol/L    Potassium 3.6 3.5 - 5.1 mmol/L    Chloride 108 97 - 108 mmol/L    CO2 22 21 - 32 mmol/L    Anion gap 10 5 - 15 mmol/L    Glucose 95 65 - 100 mg/dL    BUN 14 6 - 20 MG/DL    Creatinine 1.48 (H) 0.70 - 1.30 MG/DL    BUN/Creatinine ratio 9 (L) 12 - 20      GFR est AA 55 (L) >60 ml/min/1.73m2    GFR est non-AA 46 (L) >60 ml/min/1.73m2    Calcium 9.0 8.5 - 10.1 MG/DL    Bilirubin, total 0.7 0.2 - 1.0 MG/DL    ALT (SGPT) 13 12 - 78 U/L    AST (SGOT) 23 15 - 37 U/L    Alk. phosphatase 56 45 - 117 U/L    Protein, total 6.5 6.4 - 8.2 g/dL    Albumin 3.2 (L) 3.5 - 5.0 g/dL    Globulin 3.3 2.0 - 4.0 g/dL    A-G Ratio 1.0 (L) 1.1 - 2.2     C REACTIVE PROTEIN, QT    Collection Time: 06/02/17  2:15 AM   Result Value Ref Range    C-Reactive protein 5.79 (H) <0.60 mg/dL   MAGNESIUM    Collection Time: 06/02/17  2:15 AM   Result Value Ref Range    Magnesium 1.8 1.6 - 2.4 mg/dL   PHOSPHORUS    Collection Time: 06/02/17  2:15 AM   Result Value Ref Range    Phosphorus 2.9 2.6 - 4.7 MG/DL   CBC WITH AUTOMATED DIFF    Collection Time: 06/02/17  8:07 AM   Result Value Ref Range    WBC 22.1 (H) 4.1 - 11.1 K/uL    RBC 3.61 (L) 4.10 - 5.70 M/uL    HGB 11.0 (L) 12.1 - 17.0 g/dL    HCT 33.4 (L) 36.6 - 50.3 %    MCV 92.5 80.0 - 99.0 FL    MCH 30.5 26.0 - 34.0 PG    MCHC 32.9 30.0 - 36.5 g/dL    RDW 14.2 11.5 - 14.5 %    PLATELET 60 (L) 077 - 400 K/uL    NEUTROPHILS 50 32 - 75 %    BAND NEUTROPHILS 1 0 - 6 %    LYMPHOCYTES 5 (L) 12 - 49 %    MONOCYTES 44 (H) 5 - 13 %    EOSINOPHILS 0 0 - 7 %    BASOPHILS 0 0 - 1 %    OTHER CELL 0 0      ABS. NEUTROPHILS 11.3 (H) 1.8 - 8.0 K/UL    ABS. LYMPHOCYTES 1.1 0.8 - 3.5 K/UL    ABS. MONOCYTES 9.7 (H) 0.0 - 1.0 K/UL    ABS. EOSINOPHILS 0.0 0.0 - 0.4 K/UL    ABS.  BASOPHILS 0.0 0.0 - 0.1 K/UL    DF MANUAL      RBC COMMENTS NORMOCYTIC, NORMOCHROMIC     PERIPHERAL SMEAR    Collection Time: 06/02/17  8:07 AM   Result Value Ref Range    PERIPHERAL SMEAR (NOTE)    LD    Collection Time: 06/02/17  8:07 AM   Result Value Ref Range     85 - 241 U/L   HAPTOGLOBIN    Collection Time: 06/02/17  8:07 AM   Result Value Ref Range    Haptoglobin 79 30 - 200 mg/dL   PTT    Collection Time: 06/02/17  8:09 AM   Result Value Ref Range    aPTT 37.6 (H) 22.1 - 32.5 sec    aPTT, therapeutic range     58.0 - 77.0 SECS   PROTHROMBIN TIME + INR    Collection Time: 06/02/17  8:09 AM   Result Value Ref Range    INR 1.2 (H) 0.9 - 1.1      Prothrombin time 12.4 (H) 9.0 - 11.1 sec       Assessment/Plan:   1. CMML with chronic thrombocytopenia and leukocytosis. Platelet count is at baseline (50-60,000). WBC a little higher than usual (14,000-16,000). This is likely reactive to recent surgery, infection. No need for further work up. He should keep his scheduled follow up appointment with Dr. Kristel Gonzalez. Please call with further questions.

## 2017-06-02 NOTE — PROGRESS NOTES
[facility]  [address] [facility], [address] [place] [address]  [phone]      Medical Progress Note      NAME: Pk Escobar   :  1935  MRM:  [id number]    Date/Time: 2017         Subjective:     Chief Complaint:  Patient was seen and examined by me. Chart reviewed. Doing well. No fevers, bleeding       Objective:       Vitals:       Last 24hrs VS reviewed since prior progress note. Most recent are:    Visit Vitals    /65 (BP 1 Location: Left arm, BP Patient Position: At rest)    Pulse 67    Temp 98.6 °F (37 °C)    Resp 18    Ht 5' 9\" (1.753 m)    Wt 73.6 kg (162 lb 4.1 oz)    SpO2 98%    BMI 23.96 kg/m2     SpO2 Readings from Last 6 Encounters:   17 98%   12 99%    O2 Flow Rate (L/min): 2 l/min       Intake/Output Summary (Last 24 hours) at 17 1047  Last data filed at 17 0418   Gross per 24 hour   Intake                0 ml   Output              750 ml   Net             -750 ml        Exam:     Physical Exam:    Gen:  Elderly, thin, NAD  HEENT:  Pink conjunctivae, PERRL, hearing intact to voice, moist mucous membranes  Neck:  Supple, without masses, thyroid non-tender  Resp:  No accessory muscle use, clear breath sounds without wheezes rales or rhonchi  Card:  No murmurs, normal S1, S2 without thrills, bruits or peripheral edema  Abd:  Soft, non-tender, non-distended, normoactive bowel sounds are present  Musc:  No cyanosis or clubbing  Skin:  No rashes.   Right foot dressing c/d/i  Neuro:  Cranial nerves 3-12 are grossly intact, follows commands appropriately  Psych:  Good insight, oriented to person, place and time, alert    Medications Reviewed: (see below)    Lab Data Reviewed: (see below)    ______________________________________________________________________    Medications:     Current Facility-Administered Medications   Medication Dose Route Frequency    cefTRIAXone (ROCEPHIN) 2 g in 0.9% sodium chloride (MBP/ADV) 50 mL  2 g IntraVENous Q24H    vancomycin (VANCOCIN) 1,000 mg in 0.9% sodium chloride (MBP/ADV) 250 mL  1,000 mg IntraVENous Q24H    levothyroxine (SYNTHROID) tablet 100 mcg  100 mcg Oral ACB    aspirin (ASPIRIN) tablet 325 mg  325 mg Oral DAILY    atorvastatin (LIPITOR) tablet 10 mg  10 mg Oral QHS    cholecalciferol (VITAMIN D3) tablet 1,000 Units  1,000 Units Oral DAILY    therapeutic multivitamin (THERAGRAN) tablet 1 Tab  1 Tab Oral DAILY    oxyCODONE-acetaminophen (PERCOCET) 5-325 mg per tablet 1 Tab  1 Tab Oral Q6H PRN    sodium chloride (NS) flush 5-10 mL  5-10 mL IntraVENous Q8H    sodium chloride (NS) flush 5-10 mL  5-10 mL IntraVENous PRN    0.9% sodium chloride infusion  75 mL/hr IntraVENous CONTINUOUS          Lab Review:     Recent Labs      06/02/17   0807 06/02/17 0215   WBC  22.1*  22.5*   HGB  11.0*  11.1*   HCT  33.4*  33.7*   PLT  60*  58*     Recent Labs      06/02/17   0809 06/02/17 0215 05/31/17   1435   NA   --   140  145   K   --   3.6  4.1   CL   --   108  109*   CO2   --   22  28   GLU   --   95  89   BUN   --   14  18   CREA   --   1.48*  1.52*   CA   --   9.0  9.7   MG   --   1.8  2.0   PHOS   --   2.9   --    ALB   --   3.2*   --    TBILI   --   0.7   --    SGOT   --   23   --    ALT   --   13   --    INR  1.2*   --    --      No results found for: GLUCPOC       Assessment / Plan: Active Problems:    82 yo hx of hypothyroid, neuropathic foot ulcer, s/p right 5th toe amputation. Medicine is following as a consultant    1) Neuropathic foot ulcer/septic joint: s/p right 5th toe amputation. No osteo on path. Management of pain control, DVT prophy, rehab, per Ortho. ID is following. Will need 6 weeks of IV abx    2) VERONICA: unclear etiology. Unclear baseline Cr. Slowly improving with IVF, monitor    3) Acute thrombocytopenia: likely from infection, surgery. Patient has a hx of CML. Will check blood smear, LDH, hapto, coags.   Consult Hematology    4) Hypothyroid: cont synthroid    5) Hyperlipidemia: cont statin    Total time spent with patient: 28 [address] discussed with: Patient, wife, nursing    Discussed:  Care Plan    Prophylaxis:  per team    Disposition:  per team           ___________________________________________________    Attending Physician: Adeline Vann MD

## 2017-06-02 NOTE — INTERDISCIPLINARY ROUNDS
[address]    Patient Information    Name: Abiel Motley  Age: 81 y.o. Admission Date: 5/31/2017  Surgery/Procedure: Procedure(s):  AMPUTATION RIGHT 5TH TOE (ANKLE BLOCK)  Attending Provider: Feliz Ivory MD  Surgeon: Sondra Diaz   Problem List: Active Problems:    Neuropathy (5/31/2017)      Infection of right foot (5/31/2017)      Unspecified hypothyroidism (5/31/2017)      Hyperlipidemia (5/31/2017)      During rounds the following quality care indicators and evidence based practices were addressed :       PT/OT: Patient mobility - 5' NWB with mod assist of 2, will try offloading show this afternoon  Bed Mobility Training  Supine to Sit: Contact guard assistance  Transfer Training  Sit to Stand:  Additional time, Assist x2, Moderate assistance  Stand to Sit: Assist x2, Moderate assistance  Bed to Chair: Assist x2, Moderate assistance      Gait Training  Assistive Device: Walker, rolling, Gait belt  Ambulation - Level of Assistance: Minimal assistance, Moderate assistance, Assist x2  Distance (ft): 4 Feet (ft)   Weight Bearing Status  Right Side Weight Bearing: Non-weight bearing      Pain Assessment  Pain Intensity 1: 0 (06/01/17 0435)  Pain Location 1: Foot     Patient Stated Pain Goal: 3    Pain meds: Percocet  Antibiotics completed: Rocephin, Vancomycin  Anticoagulation:  None  Vazquez: N   Goals For Today: Progress with PT, pain control    RRAT Score:      Readmit Risk Tool  Support Systems: Spouse/Significant Other/Partner  Relationship with Primary Physician Group: Seen at least one time within the past 6 months    Discharge Management/Planning:    Readmit Risk Assessment Information:   Medium Risk            14       Total Score        3 Relationship with PCP    2 Patient Living Status    4 More than 1 Admission in calendar year    5 Patient Insurance is Medicare, Medicaid or Self Pay        Criteria that do not apply:    Patient Length of Stay > 5    Charlson Comorbidity Score         Readmit Risk Tool  Support Systems: Spouse/Significant Other/Partner  Relationship with Primary Physician Group: Seen at least one time within the past 6 months    [address] Tasneem Ortez: At [address], [address] [place] [address] Ave:  Anticipated Date of Discharge: Monday    Interdisciplinary team rounds were held with the following team members: Nurse Practitioner, Case Management, Nursing, Pharmacy, and Rehab. See clinical pathway and/or care plan for interventions and desired outcomes.

## 2017-06-03 PROBLEM — E03.9 UNSPECIFIED HYPOTHYROIDISM: Chronic | Status: ACTIVE | Noted: 2017-05-31

## 2017-06-03 PROBLEM — R79.89 ELEVATED SERUM CREATININE: Status: ACTIVE | Noted: 2017-06-03

## 2017-06-03 PROBLEM — C93.10 CMML (CHRONIC MYELOMONOCYTIC LEUKEMIA) (HCC): Chronic | Status: ACTIVE | Noted: 2017-06-03

## 2017-06-03 PROBLEM — G62.9 NEUROPATHY: Chronic | Status: ACTIVE | Noted: 2017-05-31

## 2017-06-03 PROBLEM — E78.5 HYPERLIPIDEMIA: Chronic | Status: ACTIVE | Noted: 2017-05-31

## 2017-06-03 LAB
ANION GAP BLD CALC-SCNC: 13 MMOL/L (ref 5–15)
BASOPHILS # BLD AUTO: 0 K/UL (ref 0–0.1)
BASOPHILS # BLD: 0 % (ref 0–1)
BUN SERPL-MCNC: 16 MG/DL (ref 6–20)
BUN/CREAT SERPL: 11 (ref 12–20)
CALCIUM SERPL-MCNC: 9.3 MG/DL (ref 8.5–10.1)
CHLORIDE SERPL-SCNC: 109 MMOL/L (ref 97–108)
CO2 SERPL-SCNC: 20 MMOL/L (ref 21–32)
CREAT SERPL-MCNC: 1.47 MG/DL (ref 0.7–1.3)
DIFFERENTIAL METHOD BLD: ABNORMAL
EOSINOPHIL # BLD: 0 K/UL (ref 0–0.4)
EOSINOPHIL NFR BLD: 0 % (ref 0–7)
ERYTHROCYTE [DISTWIDTH] IN BLOOD BY AUTOMATED COUNT: 14.1 % (ref 11.5–14.5)
GLUCOSE BLD STRIP.AUTO-MCNC: 98 MG/DL (ref 65–100)
GLUCOSE SERPL-MCNC: 89 MG/DL (ref 65–100)
HCT VFR BLD AUTO: 33 % (ref 36.6–50.3)
HGB BLD-MCNC: 10.7 G/DL (ref 12.1–17)
LYMPHOCYTES # BLD AUTO: 2 % (ref 12–49)
LYMPHOCYTES # BLD: 0.3 K/UL (ref 0.8–3.5)
MAGNESIUM SERPL-MCNC: 2 MG/DL (ref 1.6–2.4)
MCH RBC QN AUTO: 29.9 PG (ref 26–34)
MCHC RBC AUTO-ENTMCNC: 32.4 G/DL (ref 30–36.5)
MCV RBC AUTO: 92.2 FL (ref 80–99)
MONOCYTES # BLD: 6.5 K/UL (ref 0–1)
MONOCYTES NFR BLD AUTO: 40 % (ref 5–13)
NEUTS BAND NFR BLD MANUAL: 2 % (ref 0–6)
NEUTS SEG # BLD: 9.5 K/UL (ref 1.8–8)
NEUTS SEG NFR BLD AUTO: 56 % (ref 32–75)
PLATELET # BLD AUTO: 58 K/UL (ref 150–400)
POTASSIUM SERPL-SCNC: 3.5 MMOL/L (ref 3.5–5.1)
RBC # BLD AUTO: 3.58 M/UL (ref 4.1–5.7)
RBC MORPH BLD: ABNORMAL
SERVICE CMNT-IMP: NORMAL
SODIUM SERPL-SCNC: 142 MMOL/L (ref 136–145)
VANCOMYCIN SERPL-MCNC: 11.5 UG/ML
WBC # BLD AUTO: 16.3 K/UL (ref 4.1–11.1)

## 2017-06-03 PROCEDURE — 97530 THERAPEUTIC ACTIVITIES: CPT

## 2017-06-03 PROCEDURE — 85025 COMPLETE CBC W/AUTO DIFF WBC: CPT | Performed by: INTERNAL MEDICINE

## 2017-06-03 PROCEDURE — 80048 BASIC METABOLIC PNL TOTAL CA: CPT | Performed by: INTERNAL MEDICINE

## 2017-06-03 PROCEDURE — 36415 COLL VENOUS BLD VENIPUNCTURE: CPT | Performed by: INTERNAL MEDICINE

## 2017-06-03 PROCEDURE — 82962 GLUCOSE BLOOD TEST: CPT

## 2017-06-03 PROCEDURE — 83735 ASSAY OF MAGNESIUM: CPT | Performed by: INTERNAL MEDICINE

## 2017-06-03 PROCEDURE — 74011250636 HC RX REV CODE- 250/636: Performed by: INTERNAL MEDICINE

## 2017-06-03 PROCEDURE — 80202 ASSAY OF VANCOMYCIN: CPT | Performed by: INTERNAL MEDICINE

## 2017-06-03 PROCEDURE — 77030027138 HC INCENT SPIROMETER -A

## 2017-06-03 PROCEDURE — 97116 GAIT TRAINING THERAPY: CPT

## 2017-06-03 PROCEDURE — 74011000258 HC RX REV CODE- 258: Performed by: INTERNAL MEDICINE

## 2017-06-03 PROCEDURE — 74011250637 HC RX REV CODE- 250/637: Performed by: ORTHOPAEDIC SURGERY

## 2017-06-03 PROCEDURE — 65270000029 HC RM PRIVATE

## 2017-06-03 RX ADMIN — LEVOTHYROXINE SODIUM 100 MCG: 100 TABLET ORAL at 06:30

## 2017-06-03 RX ADMIN — Medication 10 ML: at 21:53

## 2017-06-03 RX ADMIN — ATORVASTATIN CALCIUM 10 MG: 10 TABLET, FILM COATED ORAL at 21:53

## 2017-06-03 RX ADMIN — SODIUM CHLORIDE 75 ML/HR: 900 INJECTION, SOLUTION INTRAVENOUS at 11:19

## 2017-06-03 RX ADMIN — VITAMIN D, TAB 1000IU (100/BT) 1000 UNITS: 25 TAB at 09:09

## 2017-06-03 RX ADMIN — VANCOMYCIN HYDROCHLORIDE 750 MG: 10 INJECTION, POWDER, LYOPHILIZED, FOR SOLUTION INTRAVENOUS at 10:26

## 2017-06-03 RX ADMIN — THERA TABS 1 TABLET: TAB at 09:09

## 2017-06-03 RX ADMIN — CEFTRIAXONE 2 G: 2 INJECTION, POWDER, FOR SOLUTION INTRAMUSCULAR; INTRAVENOUS at 09:08

## 2017-06-03 RX ADMIN — VANCOMYCIN HYDROCHLORIDE 750 MG: 10 INJECTION, POWDER, LYOPHILIZED, FOR SOLUTION INTRAVENOUS at 21:57

## 2017-06-03 RX ADMIN — ASPIRIN 325 MG: 325 TABLET, COATED ORAL at 09:09

## 2017-06-03 NOTE — PROGRESS NOTES
[facility] Dosing Services: Antimicrobial Stewardship Daily Doc    Consult for antibiotic dosing of Vancomycin by Dr. Niya Alcantar  Indication:neuropathic ulcer with septic joint  Day of Therapy 3    Vancomycin therapy:  Current maintenance dose: 1000 (mg) every 24 hours (frequency). Trough goal 15-20 mcg/mL. Last level 11.5 mcg/ml drawn 18 hrs post 2nd dose. Corrected trough 8.4 mcg/ml    Adjustment in Therapy:750 mg every 12 hours  Dose calculated to approximate a therapeutic trough of 18 mcg/mL. Pharmacist will follow daily and will make changes to dose and/or frequency based on clinical status.   Pharmacist Dilan Lima                              [id number]

## 2017-06-03 NOTE — PROGRESS NOTES
[facility]  [address]  [phone]      Medical Progress Note      NAME: Magui Castro   :  1935  MRM:  [id number]    Date/Time: 6/3/2017  9:50 AM         Subjective:     Chief Complaint:  Follow up for TMA of right foot, denies complaints    ROS:  (bold if positive, if negative)                        Tolerating PT  Tolerating Diet          Objective:       Vitals:          Last 24hrs VS reviewed since prior progress note.  Most recent are:    Visit Vitals    /60 (BP 1 Location: Right arm, BP Patient Position: Supine)    Pulse 69    Temp 98.4 °F (36.9 °C)    Resp 18    Ht 5' 9\" (1.753 m)    Wt 73.6 kg (162 lb 4.1 oz)    SpO2 98%    BMI 23.96 kg/m2     SpO2 Readings from Last 6 Encounters:   17 98%   12 99%    O2 Flow Rate (L/min): 2 l/min     Intake/Output Summary (Last 24 hours) at 17 0950  Last data filed at 17 9808   Gross per 24 hour   Intake              975 ml   Output              975 ml   Net                0 ml          Exam:     Physical Exam:    Gen:  Well-developed, well-nourished, elderly, in no acute distress  HEENT:  Pink conjunctivae, PERRL, hearing intact to voice, moist mucous membranes  Neck:  Supple, without masses, thyroid non-tender  Resp:  No accessory muscle use, clear breath sounds without wheezes rales or rhonchi  Card:  No murmurs, normal S1, S2 without thrills, bruits or peripheral edema, 2+ pedal pulses  Abd:  Soft, non-tender, non-distended, normoactive bowel sounds are present, no palpable organomegaly and no detectable hernias  Lymph:  No cervical or inguinal adenopathy  Musc:  No cyanosis or clubbing  Skin:  No rashes or ulcers, skin turgor is good, left foot s/p TMA, dressed, C/D/I  Neuro:  Cranial nerves are grossly intact, no focal motor weakness, follows commands appropriately  Psych:  Good insight, oriented to person, place and time, alert    Medications Reviewed: (see below)    Lab Data Reviewed: (see below)    ______________________________________________________________________    Medications:     Current Facility-Administered Medications   Medication Dose Route Frequency    vancomycin (VANCOCIN) 750 mg in 0.9% sodium chloride 250 mL IVPB  750 mg IntraVENous Q12H    Vancomycin Random AM 6/3  1 Each Other ONCE    cefTRIAXone (ROCEPHIN) 2 g in 0.9% sodium chloride (MBP/ADV) 50 mL  2 g IntraVENous Q24H    levothyroxine (SYNTHROID) tablet 100 mcg  100 mcg Oral ACB    aspirin (ASPIRIN) tablet 325 mg  325 mg Oral DAILY    atorvastatin (LIPITOR) tablet 10 mg  10 mg Oral QHS    cholecalciferol (VITAMIN D3) tablet 1,000 Units  1,000 Units Oral DAILY    therapeutic multivitamin (THERAGRAN) tablet 1 Tab  1 Tab Oral DAILY    oxyCODONE-acetaminophen (PERCOCET) 5-325 mg per tablet 1 Tab  1 Tab Oral Q6H PRN    sodium chloride (NS) flush 5-10 mL  5-10 mL IntraVENous Q8H    sodium chloride (NS) flush 5-10 mL  5-10 mL IntraVENous PRN    0.9% sodium chloride infusion  75 mL/hr IntraVENous CONTINUOUS            Lab Review:     Recent Labs      06/03/17   0439  06/02/17   0807  06/02/17   0215   WBC  16.3*  22.1*  22.5*   HGB  10.7*  11.0*  11.1*   HCT  33.0*  33.4*  33.7*   PLT  58*  60*  58*     Recent Labs      06/03/17   0439  06/02/17   0809  06/02/17   0215  05/31/17   1435   NA  142   --   140  145   K  3.5   --   3.6  4.1   CL  109*   --   108  109*   CO2  20*   --   22  28   GLU  89   --   95  89   BUN  16   --   14  18   CREA  1.47*   --   1.48*  1.52*   CA  9.3   --   9.0  9.7   MG  2.0   --   1.8  2.0   PHOS   --    --   2.9   --    ALB   --    --   3.2*   --    TBILI   --    --   0.7   --    SGOT   --    --   23   --    ALT   --    --   13   --    INR   --   1.2*   --    --      No results found for: GLUCPOC  No results for input(s): PH, PCO2, PO2, HCO3, FIO2 in the last 72 hours.   Recent Labs      06/02/17   0809   INR  1.2*     No results found for: SDES  Lab Results   Component Value Date/Time    Culture result: NO GROWTH 2 DAYS 05/31/2017 11:41 AM    Culture result: NO GROWTH 2 DAYS 05/31/2017 11:41 AM            Assessment:     Active Problems:    Neuropathy (5/31/2017)      Infection of right foot (5/31/2017)      Unspecified hypothyroidism (5/31/2017)      Hyperlipidemia (5/31/2017)      Elevated serum creatinine (6/3/2017)      CMML (chronic myelomonocytic leukemia) ([facility] (6/3/2017)           Plan:      Active Problems:    Neuropathy (5/31/2017)   - continue pain meds      Infection of right foot (5/31/2017)   - s/p TMA   - home IV antibiotics per ID      Unspecified hypothyroidism (5/31/2017)   - continue LT4      Hyperlipidemia (5/31/2017)   - continue statin      Elevated serum creatinine (6/3/2017)   - this does NOT appear to be VERONICA/ARF   - his last prior creatinine in out system is 11years old   - I think he has CKD 3 and is at his baseline    - follow creatinine on IV antibiotics   - needs outpatient referrabl by PCP to Renal      CMML (chronic myelomonocytic leukemia) ([facility] (6/3/2017)   - WBC down, platelets stable, Dr. Suellen Mendoza following in house      Total time spent with patient: 25 minutes                  Care Plan discussed with: Patient and Nursing Staff    Discussed:  Code Status, Care Plan and D/C Planning    Prophylaxis:  SCD's    Disposition:   PT, OT, RN           ___________________________________________________    Attending Physician: Eduardo Jasmine MD

## 2017-06-03 NOTE — PROGRESS NOTES
Bedside shift change report given to Leah (oncoming nurse) by Grey Telles (offgoing nurse). Report included the following information SBAR, Kardex, Intake/Output and MAR.

## 2017-06-03 NOTE — PROGRESS NOTES
Orthopaedic Progress Note  Post Op day: 3 Days Post-Op    Mendy 3, 2017 2:37 PM     Patient: Ama Coreas MRN: [id number]  SSN: [id number]    YOB: 1935  Age: 81 y.o. Sex: male      Admit date:  2017  Date of Surgery:  2017   Procedures:  Procedure(s):  AMPUTATION RIGHT 5TH TOE (ANKLE BLOCK)  Admitting Physician:  Philipp Middleton MD   Surgeon:  Marleen Agarwal) and Role:     * Philipp Middleton MD - Primary    Consulting Physician(s): Treatment Team: Attending Provider: Philipp Middleton MD; Consulting Provider: Korey Crisostomo DO; Care Manager: Belen Huang; Consulting Provider: Peg Hernandez NP; Utilization Review: Cedrick Harkins RN; Consulting Provider: Stella Ayala MD    SUBJECTIVE:     Ama Coreas is a 81 y.o. male is 3 Days Post-Op s/p Procedure(s):  AMPUTATION RIGHT 5TH TOE (ANKLE BLOCK) with an appropriate level of post-operative pain. No complaints of nausea, vomiting, dizziness, lightheadedness, chest pain, or shortness of breath. OBJECTIVE:  Cultures negative thus far       Physical Exam:  General: Alert, cooperative, no distress. Respiratory: Respirations unlabored  Neurological:  Neurovascular exam within normal limits. Motor: + DF/PF. Musculoskeletal: Calves soft, supple, non-tender upon palpation. Dressing/Wound:  Removed dressing. Incision on lateral aspect of right foot with sutures intact. Small amount of bloody drainage noted on the bandage. No pus. Mild erythema. No pus noted. Pt insensate to foot (Chronic).  PP +2. Cap refill less than 3 seconds    Vital Signs:      Patient Vitals for the past 8 hrs:   BP Temp Pulse Resp SpO2   17 1130 130/62 98.1 °F (36.7 °C) 65 18 91 %   17 0758 117/60 98.4 °F (36.9 °C) 69 18 98 %                                          Temp (24hrs), Av.4 °F (36.9 °C), Min:97.8 °F (36.6 °C), Max:98.8 °F (37.1 °C)      Labs:        Recent Labs      17   0439  17   0809   HCT  33.0*   -- HGB  10.7*   --    INR   --   1.2*     Lab Results   Component Value Date/Time    Sodium 142 06/03/2017 04:39 AM    Potassium 3.5 06/03/2017 04:39 AM    Chloride 109 06/03/2017 04:39 AM    CO2 20 06/03/2017 04:39 AM    Glucose 89 06/03/2017 04:39 AM    BUN 16 06/03/2017 04:39 AM    Creatinine 1.47 06/03/2017 04:39 AM    Calcium 9.3 06/03/2017 04:39 AM       PT/OT:        Gait  Base of Support: Narrowed  Speed/Vernell: Pace decreased (<100 feet/min)  Step Length: Left shortened, Right shortened  Gait Abnormalities: Decreased step clearance  Ambulation - Level of Assistance: Supervision  Distance (ft): 200 Feet (ft)  Assistive Device: Walker, rolling, Gait belt, Other (comment) (forefoot unloading shoe)  Rail Use: Left   Stairs - Level of Assistance: Minimum assistance  Number of Stairs Trained: 5       Patient mobility  Bed Mobility Training  Supine to Sit: Supervision  Transfer Training  Sit to Stand: Contact guard assistance, Additional time, Assist x1  Stand to Sit: Contact guard assistance, Assist x1, Additional time  Bed to Chair: Assist x2, Moderate assistance      Gait Training  Assistive Device: Walker, rolling, Gait belt, Other (comment) (forefoot unloading shoe)  Ambulation - Level of Assistance: Supervision  Distance (ft): 200 Feet (ft)  Stairs - Level of Assistance: Minimum assistance  Number of Stairs Trained: 5  Rail Use: Left    Weight Bearing Status  Right Side Weight Bearing: Non-weight bearing        ASSESSMENT / PLAN:   Active Problems:    Neuropathy (5/31/2017)      Infection of right foot (5/31/2017)      Unspecified hypothyroidism (5/31/2017)      Hyperlipidemia (5/31/2017)      Elevated serum creatinine (6/3/2017)      CMML (chronic myelomonocytic leukemia) ([facility] (6/3/2017)                    Pain Control: Adequate   DVT Prophylaxis: ASA, mechanical   Hemodynamics: Stable   Activity: Weight bear thru heel.    Disposition: Still awaiting final cultures     Signed By:  Maira Leary [place] [id number] [facility]  [phone]

## 2017-06-03 NOTE — PROGRESS NOTES
Problem: Mobility Impaired (Adult and Pediatric)  Goal: *Acute Goals and Plan of Care (Insert Text)  Physical Therapy Goals  Initiated 6/1/2017  1. Patient will move from supine to sit and sit to supine in bed with modified independence within 7 day(s). 2. Patient will transfer from bed to chair and chair to bed with modified independence using the least restrictive device within 7 day(s). 3. Patient will perform sit to stand with modified independence within 7 day(s). 4. Patient will ambulate with minimal assistance/contact guard assist for 20 feet with the least restrictive device within 7 day(s). 5. Patient will ascend/descend 4 stairs with 1 handrail(s) with minimal assistance/contact guard assist within 7 day(s). PHYSICAL THERAPY TREATMENT  Patient: Matthew Lemus (89 y.o. male)  Date: 6/3/2017  Diagnosis: NEUROPATHIC ULCER RIGHT FOOT   Infection of right foot  Diabetes 1.5, managed as type 2 (HCC)  Neuropathy <principal problem not specified>  Procedure(s) (LRB):  AMPUTATION RIGHT 5TH TOE (ANKLE BLOCK) (Right) 3 Days Post-Op  Precautions: NWB, Fall      ASSESSMENT:  Reviewed transfer training for sit <> stand with cues for hand placement to improve safety. Wife present for all of the training. Reviewed donning of off loading shoe. Patient with improved gait distance and able to try stairs today. Cues for decreasing step length during level gait training to decrease weight bearing on forefoot. Unsteady with going down the steps - should review this again using the sideways (two hands on one rail) approach. Also needs review of hand placement with  stand > sit. Progression toward goals:  [X]    Improving appropriately and progressing toward goals  [ ]    Improving slowly and progressing toward goals  [ ]    Not making progress toward goals and plan of care will be adjusted       PLAN:  Patient continues to benefit from skilled intervention to address the above impairments.   Continue treatment per established plan of care. Discharge Recommendations:  Home Health  Further Equipment Recommendations for Discharge:  Already has a rolling walker       SUBJECTIVE:   Patient stated I'm going home Monday.       OBJECTIVE DATA SUMMARY:   Critical Behavior:  Neurologic State: Alert  Orientation Level: Oriented X4  Cognition: Appropriate safety awareness  Safety/Judgement: Insight into deficits, Good awareness of safety precautions  Functional Mobility Training:  Bed Mobility:     Supine to Sit: Supervision              Transfers:  Sit to Stand: Contact guard assistance; Additional time;Assist x1  Stand to Sit: Contact guard assistance;Assist x1;Additional time                             Balance:     Ambulation/Gait Training:  Distance (ft): 200 Feet (ft)  Assistive Device: Walker, rolling;Gait belt; Other (comment) (forefoot unloading shoe)  Ambulation - Level of Assistance: Supervision                                                          Stairs:  Number of Stairs Trained: 5  Stairs - Level of Assistance: Minimum assistance              Rail Use: Left      Pain:  Pain Scale 1: Numeric (0 - 10)  Pain Intensity 1: 0              Activity Tolerance:   Limited by fatigue  Please refer to the flowsheet for vital signs taken during this treatment.   After treatment:   [X]    Patient left in no apparent distress sitting up in chair  [ ]    Patient left in no apparent distress in bed  [X]    Call bell left within reach  [X]    Nursing notified  [X]    Caregiver present  [X]    Chair alarm activated      COMMUNICATION/COLLABORATION:   The patients plan of care was discussed with: Registered Nurse     Payam Monteiro PT   Time Calculation: 30 mins

## 2017-06-04 LAB
BACTERIA SPEC CULT: NORMAL
BACTERIA SPEC CULT: NORMAL
CREAT SERPL-MCNC: 1.41 MG/DL (ref 0.7–1.3)
DATE LAST DOSE: ABNORMAL
GRAM STN SPEC: NORMAL
REPORTED DOSE,DOSE: ABNORMAL UNITS
REPORTED DOSE/TIME,TMG: ABNORMAL
SERVICE CMNT-IMP: NORMAL
SERVICE CMNT-IMP: NORMAL
VANCOMYCIN TROUGH SERPL-MCNC: 18.8 UG/ML (ref 5–10)

## 2017-06-04 PROCEDURE — 74011250637 HC RX REV CODE- 250/637: Performed by: INTERNAL MEDICINE

## 2017-06-04 PROCEDURE — 74011000258 HC RX REV CODE- 258: Performed by: INTERNAL MEDICINE

## 2017-06-04 PROCEDURE — 65270000029 HC RM PRIVATE

## 2017-06-04 PROCEDURE — 36415 COLL VENOUS BLD VENIPUNCTURE: CPT | Performed by: INTERNAL MEDICINE

## 2017-06-04 PROCEDURE — 74011250636 HC RX REV CODE- 250/636: Performed by: INTERNAL MEDICINE

## 2017-06-04 PROCEDURE — 80202 ASSAY OF VANCOMYCIN: CPT | Performed by: ORTHOPAEDIC SURGERY

## 2017-06-04 PROCEDURE — 82565 ASSAY OF CREATININE: CPT | Performed by: INTERNAL MEDICINE

## 2017-06-04 PROCEDURE — 97116 GAIT TRAINING THERAPY: CPT | Performed by: PHYSICAL THERAPIST

## 2017-06-04 PROCEDURE — 74011250637 HC RX REV CODE- 250/637: Performed by: ORTHOPAEDIC SURGERY

## 2017-06-04 RX ORDER — POLYETHYLENE GLYCOL 3350 17 G/17G
17 POWDER, FOR SOLUTION ORAL DAILY
Status: DISCONTINUED | OUTPATIENT
Start: 2017-06-04 | End: 2017-06-05 | Stop reason: HOSPADM

## 2017-06-04 RX ADMIN — CEFTRIAXONE 2 G: 2 INJECTION, POWDER, FOR SOLUTION INTRAMUSCULAR; INTRAVENOUS at 08:17

## 2017-06-04 RX ADMIN — THERA TABS 1 TABLET: TAB at 08:18

## 2017-06-04 RX ADMIN — SODIUM CHLORIDE 75 ML/HR: 900 INJECTION, SOLUTION INTRAVENOUS at 03:00

## 2017-06-04 RX ADMIN — POLYETHYLENE GLYCOL 3350 17 G: 17 POWDER, FOR SOLUTION ORAL at 13:16

## 2017-06-04 RX ADMIN — Medication 10 ML: at 13:16

## 2017-06-04 RX ADMIN — VANCOMYCIN HYDROCHLORIDE 750 MG: 10 INJECTION, POWDER, LYOPHILIZED, FOR SOLUTION INTRAVENOUS at 22:07

## 2017-06-04 RX ADMIN — VITAMIN D, TAB 1000IU (100/BT) 1000 UNITS: 25 TAB at 08:18

## 2017-06-04 RX ADMIN — ATORVASTATIN CALCIUM 10 MG: 10 TABLET, FILM COATED ORAL at 22:07

## 2017-06-04 RX ADMIN — LEVOTHYROXINE SODIUM 100 MCG: 100 TABLET ORAL at 06:45

## 2017-06-04 RX ADMIN — Medication 10 ML: at 22:07

## 2017-06-04 RX ADMIN — VANCOMYCIN HYDROCHLORIDE 750 MG: 10 INJECTION, POWDER, LYOPHILIZED, FOR SOLUTION INTRAVENOUS at 11:06

## 2017-06-04 NOTE — PROGRESS NOTES
[facility] [place] [address]  [address] [facility] [place], [address] [place] [address]  [phone]      Medical Progress Note      NAME: Cornelia Tate   :  1935  MRM:  [id number]    Date/Time: 2017  9:33 AM          Subjective:     Chief Complaint:  Follow up for TMA of right foot, denies complaints    ROS:  (bold if positive, if negative)                        Tolerating PT  Tolerating Diet          Objective:       Vitals:          Last 24hrs VS reviewed since prior progress note.  Most recent are:    Visit Vitals    /60 (BP 1 Location: Right arm, BP Patient Position: At rest)    Pulse 60    Temp 98 °F (36.7 °C)    Resp 17    Ht 5' 9\" (1.753 m)    Wt 73.6 kg (162 lb 4.1 oz)    SpO2 97%    BMI 23.96 kg/m2     SpO2 Readings from Last 6 Encounters:   17 97%   12 99%    O2 Flow Rate (L/min): 2 l/min   No intake or output data in the 24 hours ending 17 0933       Exam:     Physical Exam:    Gen:  Well-developed, well-nourished, elderly, in no acute distress  HEENT:  Pink conjunctivae, PERRL, hearing intact to voice, moist mucous membranes  Neck:  Supple, without masses, thyroid non-tender  Resp:  No accessory muscle use, clear breath sounds without wheezes rales or rhonchi  Card:  No murmurs, normal S1, S2 without thrills, bruits or peripheral edema, 2+ pedal pulses  Abd:  Soft, non-tender, non-distended, normoactive bowel sounds are present, no palpable organomegaly and no detectable hernias  Lymph:  No cervical or inguinal adenopathy  Musc:  No cyanosis or clubbing  Skin:  No rashes or ulcers, skin turgor is good, left foot s/p TMA, dressed, C/D/I  Neuro:  Cranial nerves are grossly intact, no focal motor weakness, follows commands appropriately  Psych:  Good insight, oriented to person, place and time, alert    Medications Reviewed: (see below)    Lab Data Reviewed: (see below)    ______________________________________________________________________    Medications:     Current Facility-Administered Medications   Medication Dose Route Frequency    vancomycin (VANCOCIN) 750 mg in 0.9% sodium chloride 250 mL IVPB  750 mg IntraVENous Q12H    cefTRIAXone (ROCEPHIN) 2 g in 0.9% sodium chloride (MBP/ADV) 50 mL  2 g IntraVENous Q24H    levothyroxine (SYNTHROID) tablet 100 mcg  100 mcg Oral ACB    aspirin (ASPIRIN) tablet 325 mg  325 mg Oral DAILY    atorvastatin (LIPITOR) tablet 10 mg  10 mg Oral QHS    cholecalciferol (VITAMIN D3) tablet 1,000 Units  1,000 Units Oral DAILY    therapeutic multivitamin (THERAGRAN) tablet 1 Tab  1 Tab Oral DAILY    oxyCODONE-acetaminophen (PERCOCET) 5-325 mg per tablet 1 Tab  1 Tab Oral Q6H PRN    sodium chloride (NS) flush 5-10 mL  5-10 mL IntraVENous Q8H    sodium chloride (NS) flush 5-10 mL  5-10 mL IntraVENous PRN    0.9% sodium chloride infusion  75 mL/hr IntraVENous CONTINUOUS            Lab Review:     Recent Labs      06/03/17 0439 06/02/17   0807  06/02/17 0215   WBC  16.3*  22.1*  22.5*   HGB  10.7*  11.0*  11.1*   HCT  33.0*  33.4*  33.7*   PLT  58*  60*  58*     Recent Labs      06/04/17   0029  06/03/17   0439 06/02/17   0809  06/02/17   0215   NA   --   142   --   140   K   --   3.5   --   3.6   CL   --   109*   --   108   CO2   --   20*   --   22   GLU   --   89   --   95   BUN   --   16   --   14   CREA  1.41*  1.47*   --   1.48*   CA   --   9.3   --   9.0   MG   --   2.0   --   1.8   PHOS   --    --    --   2.9   ALB   --    --    --   3.2*   TBILI   --    --    --   0.7   SGOT   --    --    --   23   ALT   --    --    --   13   INR   --    --   1.2*   --      Lab Results   Component Value Date/Time    Glucose (POC) 98 06/03/2017 09:12 PM     No results for input(s): PH, PCO2, PO2, HCO3, FIO2 in the last 72 hours.   Recent Labs      06/02/17   0809   INR  1.2*     No results found for: SDES  Lab Results   Component Value Date/Time    Culture result: NO GROWTH 3 DAYS 05/31/2017 11:41 AM    Culture result: NO GROWTH 3 DAYS 05/31/2017 11:41 AM            Assessment:     Active Problems:    Neuropathy (5/31/2017)      Infection of right foot (5/31/2017)      Unspecified hypothyroidism (5/31/2017)      Hyperlipidemia (5/31/2017)      Elevated serum creatinine (6/3/2017)      CMML (chronic myelomonocytic leukemia) ([facility] (6/3/2017)           Plan:      Active Problems:    Neuropathy (5/31/2017)   - continue pain meds      Infection of right foot (5/31/2017)   - s/p TMA   - home IV antibiotics per ID      Unspecified hypothyroidism (5/31/2017)   - continue LT4      Hyperlipidemia (5/31/2017)   - continue statin      Elevated serum creatinine (6/3/2017)   - this does NOT appear to be VERONICA/ARF   - his last prior creatinine in out system is 11years old   - I think he has CKD 3 and is at his baseline    - follow creatinine on IV antibiotics   - needs outpatient referrabl by PCP to Renal      CMML (chronic myelomonocytic leukemia) ([facility] (6/3/2017)   - WBC down, platelets stable, Dr. Aminata Guy following in house      Total time spent with patient: 15 minutes                  Care Plan discussed with: Patient and Nursing Staff    Discussed:  Code Status, Care Plan and D/C Planning    Prophylaxis:  SCD's    Disposition:   PT, OT, RN           ___________________________________________________    Attending Physician: Bryce Pleitez MD

## 2017-06-04 NOTE — PROGRESS NOTES
Problem: Mobility Impaired (Adult and Pediatric)  Goal: *Acute Goals and Plan of Care (Insert Text)  Physical Therapy Goals  Initiated 6/1/2017  1. Patient will move from supine to sit and sit to supine in bed with modified independence within 7 day(s). 2. Patient will transfer from bed to chair and chair to bed with modified independence using the least restrictive device within 7 day(s). 3. Patient will perform sit to stand with modified independence within 7 day(s). 4. Patient will ambulate with minimal assistance/contact guard assist for 20 feet with the least restrictive device within 7 day(s). 5. Patient will ascend/descend 4 stairs with 1 handrail(s) with minimal assistance/contact guard assist within 7 day(s). PHYSICAL THERAPY TREATMENT  Patient: Jordy Coker (78 y.o. male)  Date: 6/4/2017  Diagnosis: NEUROPATHIC ULCER RIGHT FOOT   Infection of right foot  Diabetes 1.5, managed as type 2 (HCC)  Neuropathy <principal problem not specified>  Procedure(s) (LRB):  AMPUTATION RIGHT 5TH TOE (ANKLE BLOCK) (Right) 4 Days Post-Op  Precautions: NWB, Fall      ASSESSMENT:  Patient received completing breakfast. Deferred gait training for 30 minutes, but pt pleasant and agreeable with anticipated discharge home tomorrow. Upon return, pt denies pain at present. Assisted with donning athletic shoe/off loading shoe RLE. Pt able to come to EOB with supervision for safety only. Sit <--->stand with CGA only, verbal reminders for hand placement for safe, controlled descent. Pt ambulated ~ 200 feet with RW without rest intervals. Stair training using bilateral hand grasp on single left rail as simulated for home stairs. CGA for safety, and verbal reminders for foot placement to prohibit entanglement. Successful completion without incidence, returned to bedside chair with chair alarm patent. Call bell and phone within reach.  Pt has good endurance, and hopes to be able to walk his  daughter down the aisle on her wedding day, Mend[place] 24[address]. Progression toward goals:  [X]    Improving appropriately and progressing toward goals  [ ]    Improving slowly and progressing toward goals  [ ]    Not making progress toward goals and plan of care will be adjusted       PLAN:  Patient continues to benefit from skilled intervention to address the above impairments. Continue treatment per established plan of care. 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 Valerio Phan      OBJECTIVE DATA SUMMARY:   Critical Behavior:  Neurologic State: Alert  Orientation Level: Oriented X4  Cognition: Appropriate for age attention/concentration  Safety/Judgement: Insight into deficits, Good awareness of safety precautions  Functional Mobility Training:  Bed Mobility:  Rolling: Modified independent  Supine to Sit: Supervision   Scooting: Modified independent  Transfers:  Sit to Stand: Contact guard assistance;Assist x1  Stand to Sit: Contact guard assistance;Assist x1  Bed to Chair: Assist x1;Contact guard assistance  Balance:  Sitting: Intact  Standing: Intact; With support  Standing - Static: Constant support  Standing - Dynamic : Fair  Ambulation/Gait Training:  Distance (ft): 200 Feet (ft)  Assistive Device: Gait belt;Walker, rolling;Orthotic device  Ambulation - Level of Assistance: Supervision  Gait Abnormalities: Decreased step clearance  Right Side Weight Bearing: Non-weight bearing  Base of Support: Narrowed  Speed/Vernell: Pace decreased (<100 feet/min)  Step Length: Left shortened;Right shortened     Stairs:  Number of Stairs Trained: 5  Stairs - Level of Assistance: Contact guard assistance              Rail use: left with bilateral hand  single railing  Pain:  Pain Scale 1: Numeric (0 - 10)  Pain Intensity 1: 0   Activity Tolerance:    Tolerating mod distance ambulation without difficulty. Stair training today lateral with increased comfort and compliance with wt bear restrictions on the right foot. Please refer to the flowsheet for vital signs taken during this treatment.   After treatment:   [X]    Patient left in no apparent distress sitting up in chair  [ ]    Patient left in no apparent distress in bed  [X]    Call bell left within reach  [X]    Nursing notified  [ ]    Caregiver present  [X]    Chair alarm activated      COMMUNICATION/COLLABORATION:   The patients plan of care was discussed with: Registered Nurse     Christopher Howard, PT   Time Calculation: 25 mins

## 2017-06-04 NOTE — PROGRESS NOTES
Verbal shift change report given to Cecil Winchester (oncoming nurse) by HECTOR Dillon (offgoing nurse). Report given with SBAR, Kardex and MAR.

## 2017-06-04 NOTE — PROGRESS NOTES
Bedside and Verbal shift change report given to Zoran Franz RN (oncoming nurse) by Giovana Alex RN (offgoing nurse).  Report included the following information SBAR, Kardex, Intake/Output and MAR.

## 2017-06-04 NOTE — PROGRESS NOTES
Ortho update:    (Patient is bathroom during visit)  Pt wife states patient is without complaints. He feels like he is getting stronger. No complaints of SOB, no nausea, vomiting. Complains of constipation. Per nursing; dressing CDI. Cultures still without growth    A: S/p 5th ray amputation  Awaiting final culture result  Original plan was for 6 weeks of IV abx. Dr. Lilly Drake and Dr. Gordillo Gain to determine definite plan after final cultures resulted.     Eduin Sheth, NP

## 2017-06-05 ENCOUNTER — APPOINTMENT (OUTPATIENT)
Dept: INTERVENTIONAL RADIOLOGY/VASCULAR | Age: 82
DRG: 475 | End: 2017-06-05
Attending: INTERNAL MEDICINE
Payer: MEDICARE

## 2017-06-05 VITALS
WEIGHT: 162.26 LBS | HEART RATE: 60 BPM | RESPIRATION RATE: 17 BRPM | OXYGEN SATURATION: 98 % | SYSTOLIC BLOOD PRESSURE: 120 MMHG | BODY MASS INDEX: 24.03 KG/M2 | DIASTOLIC BLOOD PRESSURE: 67 MMHG | TEMPERATURE: 97.8 F | HEIGHT: 69 IN

## 2017-06-05 PROBLEM — N18.30 CKD (CHRONIC KIDNEY DISEASE) STAGE 3, GFR 30-59 ML/MIN (HCC): Chronic | Status: ACTIVE | Noted: 2017-06-05

## 2017-06-05 LAB
CREAT SERPL-MCNC: 1.44 MG/DL (ref 0.7–1.3)
DATE LAST DOSE: ABNORMAL
REPORTED DOSE,DOSE: ABNORMAL UNITS
REPORTED DOSE/TIME,TMG: 1100
VANCOMYCIN TROUGH SERPL-MCNC: 21.1 UG/ML (ref 5–10)

## 2017-06-05 PROCEDURE — 97530 THERAPEUTIC ACTIVITIES: CPT

## 2017-06-05 PROCEDURE — 74011250636 HC RX REV CODE- 250/636: Performed by: INTERNAL MEDICINE

## 2017-06-05 PROCEDURE — 74011250637 HC RX REV CODE- 250/637: Performed by: INTERNAL MEDICINE

## 2017-06-05 PROCEDURE — 80202 ASSAY OF VANCOMYCIN: CPT | Performed by: INTERNAL MEDICINE

## 2017-06-05 PROCEDURE — 82565 ASSAY OF CREATININE: CPT | Performed by: INTERNAL MEDICINE

## 2017-06-05 PROCEDURE — 3E04317 INTRODUCTION OF OTHER THROMBOLYTIC INTO CENTRAL VEIN, PERCUTANEOUS APPROACH: ICD-10-PCS | Performed by: RADIOLOGY

## 2017-06-05 PROCEDURE — 97116 GAIT TRAINING THERAPY: CPT

## 2017-06-05 PROCEDURE — 36415 COLL VENOUS BLD VENIPUNCTURE: CPT | Performed by: INTERNAL MEDICINE

## 2017-06-05 PROCEDURE — 74011000250 HC RX REV CODE- 250: Performed by: RADIOLOGY

## 2017-06-05 PROCEDURE — 74011250636 HC RX REV CODE- 250/636

## 2017-06-05 PROCEDURE — 74011250637 HC RX REV CODE- 250/637: Performed by: ORTHOPAEDIC SURGERY

## 2017-06-05 PROCEDURE — 74011250636 HC RX REV CODE- 250/636: Performed by: RADIOLOGY

## 2017-06-05 PROCEDURE — 36598 INJ W/FLUOR EVAL CV DEVICE: CPT

## 2017-06-05 PROCEDURE — 74011000250 HC RX REV CODE- 250

## 2017-06-05 PROCEDURE — 74011636320 HC RX REV CODE- 636/320: Performed by: RADIOLOGY

## 2017-06-05 PROCEDURE — 74011000258 HC RX REV CODE- 258: Performed by: INTERNAL MEDICINE

## 2017-06-05 RX ORDER — WATER FOR INJECTION,STERILE
VIAL (ML) INJECTION
Status: COMPLETED
Start: 2017-06-05 | End: 2017-06-05

## 2017-06-05 RX ADMIN — VITAMIN D, TAB 1000IU (100/BT) 1000 UNITS: 25 TAB at 08:31

## 2017-06-05 RX ADMIN — ALTEPLASE 2 MG: 2.2 INJECTION, POWDER, LYOPHILIZED, FOR SOLUTION INTRAVENOUS at 16:00

## 2017-06-05 RX ADMIN — WATER 2 ML: 1 INJECTION INTRAMUSCULAR; INTRAVENOUS; SUBCUTANEOUS at 16:06

## 2017-06-05 RX ADMIN — LEVOTHYROXINE SODIUM 100 MCG: 100 TABLET ORAL at 07:03

## 2017-06-05 RX ADMIN — POLYETHYLENE GLYCOL 3350 17 G: 17 POWDER, FOR SOLUTION ORAL at 08:32

## 2017-06-05 RX ADMIN — THERA TABS 1 TABLET: TAB at 08:31

## 2017-06-05 RX ADMIN — VANCOMYCIN HYDROCHLORIDE 750 MG: 10 INJECTION, POWDER, LYOPHILIZED, FOR SOLUTION INTRAVENOUS at 10:38

## 2017-06-05 RX ADMIN — CEFTRIAXONE 2 G: 2 INJECTION, POWDER, FOR SOLUTION INTRAMUSCULAR; INTRAVENOUS at 08:32

## 2017-06-05 RX ADMIN — Medication 10 ML: at 07:03

## 2017-06-05 RX ADMIN — IOPAMIDOL 10 ML: 755 INJECTION, SOLUTION INTRAVENOUS at 15:59

## 2017-06-05 NOTE — ROUTINE PROCESS
Pt placed on angio table and xray taken of rt chest port with isovue used to see the ports tip in proper position . Unable to get blood return. Cathflo instilled after 10 min's elapsed no blood return, flushes easily.  May be used for antibiotic therapy per Dr. Masoud Ramirez

## 2017-06-05 NOTE — PROGRESS NOTES
[facility]  [address] [facility], [address] [place] [address]  [phone]      Medical Progress Note      NAME: Katya Ruano   :  1935  MRM:  [id number]    Date/Time: 2017  8:50 AM           Subjective:     Chief Complaint:  Follow up for 5th ray amputation of left foot, denies complaints    ROS:  (bold if positive, if negative)                        Tolerating PT  Tolerating Diet          Objective:       Vitals:          Last 24hrs VS reviewed since prior progress note.  Most recent are:    Visit Vitals    /60 (BP 1 Location: Right arm, BP Patient Position: Supine)    Pulse 67    Temp 98.4 °F (36.9 °C)    Resp 18    Ht 5' 9\" (1.753 m)    Wt 73.6 kg (162 lb 4.1 oz)    SpO2 97%    BMI 23.96 kg/m2     SpO2 Readings from Last 6 Encounters:   17 97%   12 99%    O2 Flow Rate (L/min): 2 l/min   No intake or output data in the 24 hours ending 17 0850       Exam:     Physical Exam:    Gen:  Well-developed, well-nourished, elderly, in no acute distress  HEENT:  Pink conjunctivae, PERRL, hearing intact to voice, moist mucous membranes  Neck:  Supple, without masses, thyroid non-tender  Resp:  No accessory muscle use, clear breath sounds without wheezes rales or rhonchi  Card:  No murmurs, normal S1, S2 without thrills, bruits or peripheral edema, 2+ pedal pulses  Abd:  Soft, non-tender, non-distended, normoactive bowel sounds are present, no palpable organomegaly and no detectable hernias  Lymph:  No cervical or inguinal adenopathy  Musc:  No cyanosis or clubbing  Skin:  No rashes or ulcers, skin turgor is good, left foot s/p 5th ray amputation, dressed with some old, dried blood soaking bandage laterally over incision  Neuro:  Cranial nerves are grossly intact, no focal motor weakness, follows commands appropriately  Psych:  Good insight, oriented to person, place and time, alert    Medications Reviewed: (see below)    Lab Data Reviewed: (see below)    ______________________________________________________________________    Medications:     Current Facility-Administered Medications   Medication Dose Route Frequency    polyethylene glycol (MIRALAX) packet 17 g  17 g Oral DAILY    vancomycin (VANCOCIN) 750 mg in 0.9% sodium chloride 250 mL IVPB  750 mg IntraVENous Q12H    cefTRIAXone (ROCEPHIN) 2 g in 0.9% sodium chloride (MBP/ADV) 50 mL  2 g IntraVENous Q24H    levothyroxine (SYNTHROID) tablet 100 mcg  100 mcg Oral ACB    aspirin (ASPIRIN) tablet 325 mg  325 mg Oral DAILY    atorvastatin (LIPITOR) tablet 10 mg  10 mg Oral QHS    cholecalciferol (VITAMIN D3) tablet 1,000 Units  1,000 Units Oral DAILY    therapeutic multivitamin (THERAGRAN) tablet 1 Tab  1 Tab Oral DAILY    oxyCODONE-acetaminophen (PERCOCET) 5-325 mg per tablet 1 Tab  1 Tab Oral Q6H PRN    sodium chloride (NS) flush 5-10 mL  5-10 mL IntraVENous Q8H    sodium chloride (NS) flush 5-10 mL  5-10 mL IntraVENous PRN            Lab Review:     Recent Labs      06/03/17   0439   WBC  16.3*   HGB  10.7*   HCT  33.0*   PLT  58*     Recent Labs      06/05/17   0156  06/04/17   0029  06/03/17   0439   NA   --    --   142   K   --    --   3.5   CL   --    --   109*   CO2   --    --   20*   GLU   --    --   89   BUN   --    --   16   CREA  1.44*  1.41*  1.47*   CA   --    --   9.3   MG   --    --   2.0     Lab Results   Component Value Date/Time    Glucose (POC) 98 06/03/2017 09:12 PM     No results for input(s): PH, PCO2, PO2, HCO3, FIO2 in the last 72 hours. No results for input(s): INR in the last 72 hours.     No lab exists for component: INREXT, INREXT  No results found for: SDES  Lab Results   Component Value Date/Time    Culture result: NO GROWTH 4 DAYS 05/31/2017 11:41 AM    Culture result: NO GROWTH 4 DAYS 05/31/2017 11:41 AM            Assessment:     Active Problems:    Neuropathy (5/31/2017)      Infection of right foot (5/31/2017)      Unspecified hypothyroidism (5/31/2017) Hyperlipidemia (5/31/2017)      Elevated serum creatinine (6/3/2017)      CMML (chronic myelomonocytic leukemia) ([facility] (6/3/2017)           Plan:      Active Problems:    Neuropathy (5/31/2017)   - continue pain meds      Infection of right foot (5/31/2017)   - s/p TMA   - home IV antibiotics per ID      Unspecified hypothyroidism (5/31/2017)   - continue LT4      Hyperlipidemia (5/31/2017)   - continue statin      CKD 3   - stable creatinine at baseline      CMML (chronic myelomonocytic leukemia) ([facility] (6/3/2017)   - WBC down, platelets stable, Dr. Christiano Gregory following in house      Total time spent with patient: 15 minutes                  Care Plan discussed with: Patient and Nursing Staff    Discussed:  Code Status, Care Plan and D/C Planning    Prophylaxis:  SCD's    Disposition:   PT, OT, RN           ___________________________________________________    Attending Physician: Mumtaz Person MD

## 2017-06-05 NOTE — PROGRESS NOTES
Patient and wife handed a copy of discharge instructions including Follow up appointment with Dr. Cristal Canavan and infusion appointments. No change in home medications. No scripts given to patient.  Patient and wife verbalized understanding of instructions

## 2017-06-05 NOTE — DISCHARGE INSTRUCTIONS
Foot and Ankle Surgery Postop Care  Dr. Gulshan Akhtar      Most importantly, go home and rest.    Elevate your foot to heart level as much as possible. You may use ice to help with the pain. Note: Frozen peas work fine :)    Change your dressing daily as instructed in the hosptial    Do NOT bear weight on your foot unless specifically allowed to do so. You may bathe, but you MUST keep the dressing dry. Avoid making important decisions or driving until you are no longer taking the prescribed pain medication. Important signs and symptoms:      If any of the following signs and symptoms occurs, you should contact Dr. [facility] office. Please be advised if a problem arises which you feel requires immediate medical attention or you are unable to contact  [facility] office you should seek immediate medical attention. Signs and symptoms to watch for include:    1. A sudden increase in swelling and /or redness or warmth at the area your surgery was performed which isnt relieved by rest, ice and elevation. 2.  Oral temperature greater than 101 degrees for 12 hours or more which isnt relieved by an increase in fluid intake and taking two Tylenol every 4-6 hours. Do not exceed 4000mg of Tylenol per day. 3.  Excessive drainage from your incisions or drainage that hasnt stopped by 72 hours. 4.  Calf pain, tenderness, redness or swelling which isnt relieved with rest and elevation. 5.  Fever, chills, nausea, vomiting or other signs and symptoms which are of concern to you. 6.  If sudden shortness of breath or chest pain occur, go to the ER or call an ambulance immediately! Call Dr. Chano Smalls on your way to the hospital or after you arrive. Follow up:     Your Post-op appointment date & time: Monday, June 12 at 8am - [facility] location    Call our office at [phone] at any time should any complications occur, especially if you develop a fever above 101° F.       Dr. Michelle Luciano MD & Team

## 2017-06-05 NOTE — PROGRESS NOTES
Spiritual Care Assessment/Progress Notes    Dash Zavala [id number]  [id number]    1935  81 y.o.  male    Patient Telephone Number: [phone] (home)   Restorationism Affiliation: [place]   Language: English   Extended Emergency Contact Information  Primary Emergency Contact: Carolina Senior  Address: [address] [facility], [address] [place] [address] Phone: [phone]  Mobile Phone: [phone]  Relation: Spouse   Patient Active Problem List    Diagnosis Date Noted    CKD (chronic kidney disease) stage 3, GFR 30-59 ml/min 06/05/2017    CMML (chronic myelomonocytic leukemia) ([facility] [address] 06/03/2017    Neuropathy 05/31/2017    Infection of right foot 05/31/2017    Unspecified hypothyroidism 05/31/2017    Hyperlipidemia 05/31/2017        Date: 6/5/2017       Level of Restorationism/Spiritual Activity:  []         Involved in nella tradition/spiritual practice    []         Not involved in nella tradition/spiritual practice  [x]         Spiritually oriented    []         Claims no spiritual orientation    []         seeking spiritual identity  []         Feels alienated from Quaker practice/tradition  []         Feels angry about Quaker practice/tradition  [x]         Spirituality/Quaker tradition is a resource for coping at this time.   []         Not able to assess due to medical condition    Services Provided Today:  []         crisis intervention    []         reading Scriptures  [x]         spiritual assessment    []         prayer  []         empathic listening/emotional support  []         rites and rituals (cite in comments)  []         life review     []         Quaker support  []         theological development   []         advocacy  []         ethical dialog     []         blessing  []         bereavement support    []         support to family  []         anticipatory grief support   []         help with AMD  []         spiritual guidance    [] meditation      Spiritual Care Needs  []         Emotional Support  []         Spiritual/Anabaptism Care  []         Loss/Adjustment  []         Advocacy/Referral                /Ethics  [x]         No needs expressed at               this time  []         Other: (note in               comments)  [address]  []         Follow up visits with               pt/family  []         Provide materials  []         Schedule sacraments  []         Contact Community               Clergy  [x]         Follow up as needed  []         Other: (note in               comments)     Comments:  Initial visit and spiritual care assessment with patient and his wife. Mr. Megan Patel shared that he is hoping to be discharged today; he is feeling/doing much better and is eager to return home. I assured pt of pastoral care services and support; assured him of prayers. Pastoral care is available to follow as needed; no further specific needs identified at this time. [phone]. Visit by: Annamarie Sanders. Buster Milton.  Yazmin Wu MA, [facility]    Lead  Profession Development & Advancement

## 2017-06-05 NOTE — PROGRESS NOTES
1450 Called to 4th floor to access [address] for evaluation for use for LT Antbx at discharge. Right chest PAC accessed with sterile technique. #20 Gauge 1 in [place]. Flushes without difficulty-no blood return. Port has not been used or flushed for 2 years per patient. LESIA Tirado informed. Dr. Anupama Rosenberg informed and portagram to be done for interrogation. LESIA Tirado with telephone orders received for interrogation. [address] RN informed of situation and orders entered for interrogation.

## 2017-06-05 NOTE — PROGRESS NOTES
6/5/2017 1:12 PM Pt's daily cost for supplies through [facility] is $52.50 a day. Pt's cost for ceftraxione for 10 days is $114.16. Pt's cost for vanc for 10 days is $58.44. CM relayed these costs to pt and pt's wife. CM also explained outpatient infusion center at [facility]. Pt and pt's wife elected for pt to go to infusion center and not have home health. CM spoke with Faraz Kaminski at the infusion center, scheduled pt's first outpatient infusion appt for 6/6 at [facility]. CM faxed pt's facesheet, and iv abx orders to infusion center at [phone]. Follow up appt scheduled for Dr. Nena Sanches on 6/12 at 8am at [facility] location, this was the 1st available appointment. 6/5/2017 11:03 AM Final iv abx orders noted and sent with updates to [facility] and [facility] via [employer]. CM notified both agencies pt will discharge home today. 6/5/2017 8:12 AM CM will follow for final discharge recommendations, possible iv abx. Anticipating discharge home today.  José Antonio Johnston, MYRNAW

## 2017-06-05 NOTE — PROGRESS NOTES
[facility] Infectious Diseases Outpatient  IV Antibiotic Orders    1. 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 Routine Portacath Care including PRN cath-flow/activase to declot   3. Antibiotic:  Vancomycin 1500mg IV daily through 6/28/17                          Ceftriaxone 2gm IV daily through 6/28/15    4. Last labs  Lab Results   Component Value Date/Time    WBC 16.3 06/03/2017 04:39 AM    HGB 10.7 06/03/2017 04:39 AM    HCT 33.0 06/03/2017 04:39 AM    PLATELET 58 84/42/2802 04:39 AM    MCV 92.2 06/03/2017 04:39 AM     Lab Results   Component Value Date/Time    Sodium 142 06/03/2017 04:39 AM    Potassium 3.5 06/03/2017 04:39 AM    Chloride 109 06/03/2017 04:39 AM    CO2 20 06/03/2017 04:39 AM    Anion gap 13 06/03/2017 04:39 AM    Glucose 89 06/03/2017 04:39 AM    BUN 16 06/03/2017 04:39 AM    Creatinine 1.44 06/05/2017 01:56 AM    BUN/Creatinine ratio 11 06/03/2017 04:39 AM    GFR est AA 57 06/05/2017 01:56 AM    GFR est non-AA 47 06/05/2017 01:56 AM    Calcium 9.3 06/03/2017 04:39 AM    Bilirubin, total 0.7 06/02/2017 02:15 AM    AST (SGOT) 23 06/02/2017 02:15 AM    Alk. phosphatase 56 06/02/2017 02:15 AM    Protein, total 6.5 06/02/2017 02:15 AM    Albumin 3.2 06/02/2017 02:15 AM    Globulin 3.3 06/02/2017 02:15 AM    A-G Ratio 1.0 06/02/2017 02:15 AM    ALT (SGPT) 13 06/02/2017 02:15 AM       Last Vanc trough: 21    5. _+__ Weekly Labs:          _+__CBC/diff/platelets   _+__BUN/CRT   _+__ESR   _+__CRP   _+__Trough Vancomycin level    _+__Goal 15-20       6. Fax Final lab results and critical values to Lisa @[phone]  7. Call urgent lab results to [phone]. Allergies:  No Known Allergies  9. Pharmacy: [facility]          Home Health Nursing:  10. Pharmacy Consult for Vancomycin/Aminoglycoside Dosing  11. First Dose protocol as needed.    12. Please pull PIC line at end of therapy or send to IR for [facility] removal     Home Health: Call Angio at [facility] to schedule [place] Removal :  P: [phone]    Fax: [phone] or [phone]      Malathi Castro MD

## 2017-06-05 NOTE — PROGRESS NOTES
Orthopaedic Progress Note  Post Op day: 5 Days Post-Op    2017 2:37 PM     Patient: Katya Ruano MRN: [id number]  SSN: [id number]    YOB: 1935  Age: 81 y.o. Sex: male      Admit date:  2017  Date of Surgery:  2017   Procedures:  Procedure(s):  AMPUTATION RIGHT 5TH TOE (ANKLE BLOCK)  Admitting Physician:  Fabiola Ware MD   Surgeon:  Phoebe Haywood) and Role:     * Fabiola Ware MD - Primary    Consulting Physician(s): Treatment Team: Attending Provider: Fabiola Ware MD; Consulting Provider: Narcisa Juárez DO; Care Manager: Neisha García; Consulting Provider: Arvella Libman, NP; Utilization Review: Candis Jenkins RN; Consulting Provider: Maribel Dill MD    SUBJECTIVE:     Katya Ruano is a 81 y.o. male is 5 Days Post-Op s/p Procedure(s):  AMPUTATION RIGHT 5TH TOE (ANKLE BLOCK) with no post-operative pain. No complaints of nausea, vomiting, dizziness, lightheadedness, chest pain, or shortness of breath. OBJECTIVE:       Physical Exam:  General: Alert, cooperative, no distress. Respiratory: Respirations unlabored  Neurological:  Right remaining toes - Neurovascular exam within normal limits. Musculoskeletal: Calves soft, supple, non-tender upon palpation. Dressing/Wound:  Clean, dry and intact. No significant erythema or swelling.       Vital Signs:      Patient Vitals for the past 8 hrs:   BP Temp Pulse Resp SpO2   17 1104 121/59 98.6 °F (37 °C) 64 18 91 %   17 0746 123/60 98.4 °F (36.9 °C) 67 18 97 %                                          Temp (24hrs), Av.3 °F (36.8 °C), Min:98 °F (36.7 °C), Max:98.6 °F (37 °C)      Labs:        Recent Labs      17   0439   HCT  33.0*   HGB  10.7*     Lab Results   Component Value Date/Time    Sodium 142 2017 04:39 AM    Potassium 3.5 2017 04:39 AM    Chloride 109 2017 04:39 AM    CO2 20 2017 04:39 AM    Glucose 89 2017 04:39 AM    BUN 16 2017 04:39 AM    Creatinine 1.44 06/05/2017 01:56 AM    Calcium 9.3 06/03/2017 04:39 AM       PT/OT:        Gait  Base of Support: Narrowed  Speed/Vernell: Pace decreased (<100 feet/min)  Step Length: Left shortened, Right shortened  Gait Abnormalities: Step to gait, Decreased step clearance  Ambulation - Level of Assistance: Contact guard assistance  Distance (ft): 300 Feet (ft)  Assistive Device: Walker, rolling, Gait belt (off load shoe on R)  Rail Use: Right   Stairs - Level of Assistance: Contact guard assistance  Number of Stairs Trained: 5       Patient mobility  Bed Mobility Training  Rolling: Stand-by asssistance  Supine to Sit: Stand-by asssistance  Scooting: Stand-by asssistance  Transfer Training  Sit to Stand: Contact guard assistance  Stand to Sit: Contact guard assistance  Bed to Chair: Assist x1, Contact guard assistance      Gait Training  Assistive Device: Walker, rolling, Gait belt (off load shoe on R)  Ambulation - Level of Assistance: Contact guard assistance  Distance (ft): 300 Feet (ft)  Stairs - Level of Assistance: Contact guard assistance  Number of Stairs Trained: 5  Rail Use: Right    Weight Bearing Status  Right Side Weight Bearing: Non-weight bearing        ASSESSMENT / PLAN:   Active Problems:    Neuropathy (5/31/2017)      Infection of right foot (5/31/2017)      Unspecified hypothyroidism (5/31/2017)      Hyperlipidemia (5/31/2017)      CMML (chronic myelomonocytic leukemia) ([facility] [address] (6/3/2017)      CKD (chronic kidney disease) stage 3, GFR 30-59 ml/min (6/5/2017)         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 PT/OT/Rehab. Dressing changed by me - wife educated on proper dressing change technique and wound s/sx to watch for at home. Pt will go to infusion center daily for IV antibiotics. Pt has a portacath - IR team to access and assess prior to discharge home. Has f/u appt with Dr. Kim Royal in week from today. Patient understands and agrees with plan of care.     Pain Pt not currently taking any pain relievers; no c/o pain   Discharge Disposition Home w/ Family today. Clarification notation - pt was never systemically septic, only had the septic joint. Patient seen and evaluated by Dr Chavo Hernandez who agrees with the findings and treatment plan as outlined above.     Signed By: Ivan Hunter NP    RN, MSN, FNP-BC  Orthopedic Nurse Practitioner  [facility]

## 2017-06-05 NOTE — PROGRESS NOTES
Pt. Alert and oriented  No complaints  Afebrile  cultures no growth at 4 days  Dressing changed wound in good cond. Plan: Picc as felt reasonable by ID and d/c ilan on IV antibiotics.

## 2017-06-05 NOTE — PROGRESS NOTES
8276: Lab result received from HALI Sanchez: Vanco Trough 21.1  0859: reported aforementioned info to [employer] (pharmacy)

## 2017-06-05 NOTE — PROGRESS NOTES
Problem: Mobility Impaired (Adult and Pediatric)  Goal: *Acute Goals and Plan of Care (Insert Text)  Physical Therapy Goals  Initiated 6/1/2017  1. Patient will move from supine to sit and sit to supine in bed with modified independence within 7 day(s). 2. Patient will transfer from bed to chair and chair to bed with modified independence using the least restrictive device within 7 day(s). 3. Patient will perform sit to stand with modified independence within 7 day(s). 4. Patient will ambulate with minimal assistance/contact guard assist for 20 feet with the least restrictive device within 7 day(s). 5. Patient will ascend/descend 4 stairs with 1 handrail(s) with minimal assistance/contact guard assist within 7 day(s). PHYSICAL THERAPY TREATMENT  Patient: Kimmie Rutherford (83 y.o. male)  Date: 6/5/2017  Diagnosis: NEUROPATHIC ULCER RIGHT FOOT   Infection of right foot  Diabetes 1.5, managed as type 2 (HCC)  Neuropathy <principal problem not specified>  Procedure(s) (LRB):  AMPUTATION RIGHT 5TH TOE (ANKLE BLOCK) (Right) 5 Days Post-Op  Precautions: NWB, Fall      ASSESSMENT:  Pt received in bed, agreeable to participate with physical therapy. Reports 0/10 pain prior to session. SBA for bed mobility. Able to don off loading shoe on R and tennis shoe on L with min A. Sit<>stand using RW with CGA. Gait training using RW  X 300' with CGA/ SBA. Verbal cues slower pace to maintain heel WB only on R and sequencing step to gait pattern. Pt adapted well. Pt returned to reclining chair BLE elevated. Caregiver present and attentive throughout session. Progression toward goals:  [X]    Improving appropriately and progressing toward goals  [ ]    Improving slowly and progressing toward goals  [ ]    Not making progress toward goals and plan of care will be adjusted       PLAN:  Patient continues to benefit from skilled intervention to address the above impairments.   Continue treatment per established plan of care.  Discharge Recommendations:  HHPT  Further Equipment Recommendations for Discharge:  none       SUBJECTIVE:   Patient stated i am doing well.       OBJECTIVE DATA SUMMARY:   Critical Behavior:  Neurologic State: Alert, Appropriate for age, Eyes open spontaneously  Orientation Level: Oriented X4, Appropriate for age  Cognition: Follows commands, Appropriate for age attention/concentration, Appropriate decision making, Appropriate safety awareness  Safety/Judgement: Insight into deficits, Good awareness of safety precautions  Functional Mobility Training:  Bed Mobility:  Rolling: Stand-by asssistance  Supine to Sit: Stand-by asssistance     Scooting: Stand-by asssistance        Transfers:  Sit to Stand: Contact guard assistance  Stand to Sit: Contact guard assistance                             Balance:  Sitting: Intact  Standing - Static: Good;Constant support  Standing - Dynamic : Fair  Ambulation/Gait Training:  Distance (ft): 300 Feet (ft)  Assistive Device: Walker, rolling;Gait belt (off load shoe on R)  Ambulation - Level of Assistance: Contact guard assistance        Gait Abnormalities: Step to gait; Decreased step clearance        Base of Support: Narrowed     Speed/Vernell: Pace decreased (<100 feet/min)                                  Stairs:                       Neuro Re-Education:     Therapeutic Exercises:      Pain:  Pain Scale 1: Numeric (0 - 10)  Pain Intensity 1: 0              Activity Tolerance:   Good  Please refer to the flowsheet for vital signs taken during this treatment.   After treatment:   [X]    Patient left in no apparent distress sitting up in chair  [ ]    Patient left in no apparent distress in bed  [X]    Call bell left within reach  [X]    Nursing notified  [X]    Caregiver present  [X]    Bed alarm activated      COMMUNICATION/COLLABORATION:   The patients plan of care was discussed with: Registered Nurse     Shelli Ordonez   Time Calculation: 28 mins

## 2017-06-05 NOTE — PROGRESS NOTES
[facility] Infectious Diseases Progress Note  Dave Rodriges MD,             Sharon Henry MD,          Korey Crisostomo DO     [address]    [address], [address]  [phone]  Fax    2017      Assessment & Plan: Vancomycin, Ceftriaxone   1. 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 S/p right transmetatarsal amputation of the fifth ray on 2017 by  Dr. Alberto Smith. OR Cx neg so far. D/w , pt will need 4 weeks IV abx for septic joint. Patient has PORT, recommend to try access it. S.W. To arrange outpt IV Abx, Vancomycin and Ceftriaxone IV through 17. D/w patient and wife reg s/e and complications of IV Abx.  2. Elevated creatinine. Baseline is unknown. Suspect CKD. Follow  3. Leukocytosis. WBC decreasing. 4. Neuropathy. This appears to be idiopathic. The patient denies history of diabetes mellitus. Subjective:     No complaints    Objective:     Vitals:   Visit Vitals    /60 (BP 1 Location: Right arm, BP Patient Position: Supine)    Pulse 67    Temp 98.4 °F (36.9 °C)    Resp 18    Ht 5' 9\" (1.753 m)    Wt 73.6 kg (162 lb 4.1 oz)    SpO2 97%    BMI 23.96 kg/m2        Tmax:  Temp (24hrs), Av.2 °F (36.8 °C), Min:97.9 °F (36.6 °C), Max:98.4 °F (36.9 °C)      Exam:     GENERAL ASSESSMENT: NAD  HEENT:Nontraumatic NCAT  CHEST: no distress  SKIN:  No rash  ABDOMEN:   : Vazquez: no  EXTREMITY: Foot dressed  NEURO:Grossly non focal    Labs:        No lab exists for component: ITNL   No results for input(s): CPK, CKMB, TROIQ in the last 72 hours.   Recent Labs      17   0156  17   0029  17   0439   NA   --    --   142   K   --    --   3.5   CL   --    --   109*   CO2   --    --   20*   BUN   --    --   16   CREA  1.44*  1.41*  1.47*   GLU   --    --   89   MG   --    --   2.0   WBC   --    --   16.3*   HGB   --    --   10.7*   HCT   --    --   33.0*   PLT   --    --   58*       Lab Results   Component Value Date/Time    Sodium urine, random 97 06/01/2017 09:45 AM    Creatinine, urine 33.89 06/01/2017 09:45 AM         Cultures:     No results found for: SDES  Lab Results   Component Value Date/Time    Culture result: NO GROWTH 4 DAYS 05/31/2017 11:41 AM    Culture result: NO GROWTH 4 DAYS 05/31/2017 11:41 AM       Radiology:     Medications       Current Facility-Administered Medications   Medication Dose Route Frequency Last Dose    Vancomycin [email or web address] [email or web address]   Other ONCE      polyethylene glycol (MIRALAX) packet 17 g  17 g Oral DAILY 17 g at 06/05/17 0832    vancomycin (VANCOCIN) 750 mg in 0.9% sodium chloride 250 mL IVPB  750 mg IntraVENous Q12H 750 mg at 06/04/17 2207    cefTRIAXone (ROCEPHIN) 2 g in 0.9% sodium chloride (MBP/ADV) 50 mL  2 g IntraVENous Q24H 2 g at 06/05/17 8847    levothyroxine (SYNTHROID) tablet 100 mcg  100 mcg Oral  mcg at 06/05/17 0703    aspirin (ASPIRIN) tablet 325 mg  325 mg Oral DAILY 325 mg at 06/03/17 0909    atorvastatin (LIPITOR) tablet 10 mg  10 mg Oral QHS 10 mg at 06/04/17 2207    cholecalciferol (VITAMIN D3) tablet 1,000 Units  1,000 Units Oral DAILY 1,000 Units at 06/05/17 0831    therapeutic multivitamin [facility]) tablet 1 Tab  1 Tab Oral DAILY 1 Tab at 06/05/17 0831    oxyCODONE-acetaminophen (PERCOCET) 5-325 mg per tablet 1 Tab  1 Tab Oral Q6H PRN      sodium chloride (NS) flush 5-10 mL  5-10 mL IntraVENous Q8H 10 mL at 06/05/17 0703    sodium chloride (NS) flush 5-10 mL  5-10 mL IntraVENous PRN             Case discussed with: patient, wife, Kedar Silva MD

## 2017-06-06 ENCOUNTER — HOSPITAL ENCOUNTER (OUTPATIENT)
Dept: INFUSION THERAPY | Age: 82
Discharge: HOME | End: 2017-06-06
Payer: MEDICARE

## 2017-06-06 VITALS
RESPIRATION RATE: 18 BRPM | SYSTOLIC BLOOD PRESSURE: 105 MMHG | TEMPERATURE: 97.5 F | DIASTOLIC BLOOD PRESSURE: 61 MMHG | HEART RATE: 58 BPM

## 2017-06-06 PROCEDURE — 74011250636 HC RX REV CODE- 250/636: Performed by: INTERNAL MEDICINE

## 2017-06-06 PROCEDURE — 96365 THER/PROPH/DIAG IV INF INIT: CPT

## 2017-06-06 PROCEDURE — 74011000258 HC RX REV CODE- 258: Performed by: INTERNAL MEDICINE

## 2017-06-06 PROCEDURE — 96367 TX/PROPH/DG ADDL SEQ IV INF: CPT

## 2017-06-06 RX ORDER — SODIUM CHLORIDE 0.9 % (FLUSH) 0.9 %
10 SYRINGE (ML) INJECTION AS NEEDED
Status: DISCONTINUED | OUTPATIENT
Start: 2017-06-06 | End: 2017-06-10 | Stop reason: HOSPADM

## 2017-06-06 RX ORDER — HEPARIN 100 UNIT/ML
500 SYRINGE INTRAVENOUS AS NEEDED
Status: ACTIVE | OUTPATIENT
Start: 2017-06-06 | End: 2017-06-07

## 2017-06-06 RX ORDER — VANCOMYCIN/0.9 % SOD CHLORIDE 1.5G/250ML
1500 PLASTIC BAG, INJECTION (ML) INTRAVENOUS ONCE
Status: DISCONTINUED | OUTPATIENT
Start: 2017-06-06 | End: 2017-06-06

## 2017-06-06 RX ADMIN — Medication 10 ML: at 15:21

## 2017-06-06 RX ADMIN — HEPARIN 500 UNITS: 100 SYRINGE at 17:30

## 2017-06-06 RX ADMIN — Medication 10 ML: at 15:20

## 2017-06-06 RX ADMIN — Medication 10 ML: at 17:29

## 2017-06-06 RX ADMIN — CEFTRIAXONE SODIUM 2 G: 2 INJECTION, POWDER, FOR SOLUTION INTRAMUSCULAR; INTRAVENOUS at 15:20

## 2017-06-06 RX ADMIN — Medication 10 ML: at 15:56

## 2017-06-06 RX ADMIN — Medication 10 ML: at 17:30

## 2017-06-06 RX ADMIN — VANCOMYCIN HYDROCHLORIDE 1500 MG: 500 INJECTION, POWDER, LYOPHILIZED, FOR SOLUTION INTRAVENOUS at 16:00

## 2017-06-06 NOTE — PROGRESS NOTES
[facility] VISIT NOTE    1450  Pt arrived at [facility] ambulatory with walker and in no distress for Vancomycin/Rocephin infusion. Assessment completed, no acute complaints at this time. RIght chest port was accessed in the hospital yesterday and per MD ok to treat with no blood return. Pt denies pain and swelling with flushing. Port flushes easily. Medications received:  Rocephin IV  Vancomycin IV    Patient Vitals for the past 12 hrs:   Temp Pulse Resp BP   06/06/17 1725 97.5 °F (36.4 °C) (!) 58 18 105/61   06/06/17 1454 97.3 °F (36.3 °C) 65 18 113/70     Tolerated treatment well, no adverse reaction noted. Port flushed and secured for use tomorrow. [phone]  D/C'd from [facility] ambulatory with walker and in no distress accompanied by his wife. Next appointment is tomorrow at 1500.

## 2017-06-07 ENCOUNTER — HOSPITAL ENCOUNTER (OUTPATIENT)
Dept: INFUSION THERAPY | Age: 82
Discharge: HOME | End: 2017-06-07
Payer: MEDICARE

## 2017-06-07 VITALS
HEART RATE: 63 BPM | DIASTOLIC BLOOD PRESSURE: 70 MMHG | OXYGEN SATURATION: 99 % | SYSTOLIC BLOOD PRESSURE: 127 MMHG | RESPIRATION RATE: 18 BRPM | TEMPERATURE: 96.9 F

## 2017-06-07 PROCEDURE — 96365 THER/PROPH/DIAG IV INF INIT: CPT

## 2017-06-07 PROCEDURE — 74011250636 HC RX REV CODE- 250/636: Performed by: INTERNAL MEDICINE

## 2017-06-07 PROCEDURE — 96367 TX/PROPH/DG ADDL SEQ IV INF: CPT

## 2017-06-07 PROCEDURE — 74011000258 HC RX REV CODE- 258: Performed by: INTERNAL MEDICINE

## 2017-06-07 PROCEDURE — 96366 THER/PROPH/DIAG IV INF ADDON: CPT

## 2017-06-07 RX ADMIN — VANCOMYCIN HYDROCHLORIDE 1500 MG: 500 INJECTION, POWDER, LYOPHILIZED, FOR SOLUTION INTRAVENOUS at 15:35

## 2017-06-07 RX ADMIN — CEFTRIAXONE SODIUM 2 G: 2 INJECTION, POWDER, FOR SOLUTION INTRAMUSCULAR; INTRAVENOUS at 15:02

## 2017-06-07 NOTE — PROGRESS NOTES
[facility] VISIT NOTE    1450  Pt arrived at [facility] ambulatory with walker and in no distress for Vancomycin/Rocephin infusion. Assessment completed, no acute complaints at this time. RIght chest port was accessed in the hospital 2 days ago and per MD ok to treat with no blood return. Pt denies pain and swelling with flushing. Port flushes easily. Medications received:  Rocephin IV  Vancomycin IV    Patient Vitals for the past 12 hrs:   Temp Pulse Resp BP SpO2   06/07/17 1454 96.9 °F (36.1 °C) 63 18 127/70 99 %     Tolerated treatment well, no adverse reaction noted. Port flushed and secured for use tomorrow. [phone]  D/C'd from [facility] ambulatory with walker and in no distress accompanied by his wife. Next appointment is tomorrow at 1500.

## 2017-06-08 ENCOUNTER — HOSPITAL ENCOUNTER (OUTPATIENT)
Dept: INFUSION THERAPY | Age: 82
Discharge: HOME | End: 2017-06-08
Payer: MEDICARE

## 2017-06-08 VITALS
TEMPERATURE: 97.3 F | SYSTOLIC BLOOD PRESSURE: 115 MMHG | HEART RATE: 68 BPM | RESPIRATION RATE: 18 BRPM | DIASTOLIC BLOOD PRESSURE: 67 MMHG

## 2017-06-08 PROCEDURE — 96367 TX/PROPH/DG ADDL SEQ IV INF: CPT

## 2017-06-08 PROCEDURE — 96366 THER/PROPH/DIAG IV INF ADDON: CPT

## 2017-06-08 PROCEDURE — 74011250636 HC RX REV CODE- 250/636: Performed by: INTERNAL MEDICINE

## 2017-06-08 PROCEDURE — 74011000258 HC RX REV CODE- 258: Performed by: INTERNAL MEDICINE

## 2017-06-08 PROCEDURE — 96365 THER/PROPH/DIAG IV INF INIT: CPT

## 2017-06-08 RX ORDER — HEPARIN 100 UNIT/ML
500 SYRINGE INTRAVENOUS AS NEEDED
Status: ACTIVE | OUTPATIENT
Start: 2017-06-08 | End: 2017-06-09

## 2017-06-08 RX ORDER — SODIUM CHLORIDE 0.9 % (FLUSH) 0.9 %
10-40 SYRINGE (ML) INJECTION AS NEEDED
Status: ACTIVE | OUTPATIENT
Start: 2017-06-08 | End: 2017-06-09

## 2017-06-08 RX ADMIN — Medication 10 ML: at 17:10

## 2017-06-08 RX ADMIN — CEFTRIAXONE SODIUM 2 G: 2 INJECTION, POWDER, FOR SOLUTION INTRAMUSCULAR; INTRAVENOUS at 15:04

## 2017-06-08 RX ADMIN — VANCOMYCIN HYDROCHLORIDE 1500 MG: 500 INJECTION, POWDER, LYOPHILIZED, FOR SOLUTION INTRAVENOUS at 15:40

## 2017-06-08 RX ADMIN — SODIUM CHLORIDE, PRESERVATIVE FREE 500 UNITS: 5 INJECTION INTRAVENOUS at 17:10

## 2017-06-08 NOTE — PROGRESS NOTES
[facility] VISIT NOTE    1450  Pt arrived at [facility] ambulatory with walker and in no distress for Vancomycin/Rocephin infusion. Assessment completed, no acute complaints at this time. RIght chest port was accessed in the hospital 2 days ago and per MD schwarz to treat with no blood return. Pt denies pain and swelling with flushing. Port flushes easily. Medications received:  Rocephin IV  Vancomycin IV    Patient Vitals for the past 12 hrs:   Temp Pulse Resp BP   06/08/17 1457 97.3 °F (36.3 °C) 68 18 115/67     Tolerated treatment well, no adverse reaction noted. Port flushed and secured for use tomorrow. 1710  D/C'd from [facility] ambulatory with walker and in no distress accompanied by his wife. Next appointment is tomorrow at 1500.

## 2017-06-09 ENCOUNTER — HOSPITAL ENCOUNTER (OUTPATIENT)
Dept: INFUSION THERAPY | Age: 82
Discharge: HOME | End: 2017-06-09
Payer: MEDICARE

## 2017-06-09 VITALS
SYSTOLIC BLOOD PRESSURE: 177 MMHG | RESPIRATION RATE: 18 BRPM | HEART RATE: 61 BPM | DIASTOLIC BLOOD PRESSURE: 66 MMHG | TEMPERATURE: 97.8 F

## 2017-06-09 LAB
BUN SERPL-MCNC: 18 MG/DL (ref 6–20)
CREAT SERPL-MCNC: 1.57 MG/DL (ref 0.7–1.3)
CRP SERPL-MCNC: <0.29 MG/DL
ERYTHROCYTE [SEDIMENTATION RATE] IN BLOOD: 24 MM/HR (ref 0–20)

## 2017-06-09 PROCEDURE — 84520 ASSAY OF UREA NITROGEN: CPT | Performed by: INTERNAL MEDICINE

## 2017-06-09 PROCEDURE — 36415 COLL VENOUS BLD VENIPUNCTURE: CPT | Performed by: INTERNAL MEDICINE

## 2017-06-09 PROCEDURE — 74011000258 HC RX REV CODE- 258: Performed by: INTERNAL MEDICINE

## 2017-06-09 PROCEDURE — 96366 THER/PROPH/DIAG IV INF ADDON: CPT

## 2017-06-09 PROCEDURE — C1768 GRAFT, VASCULAR: HCPCS

## 2017-06-09 PROCEDURE — 85652 RBC SED RATE AUTOMATED: CPT | Performed by: INTERNAL MEDICINE

## 2017-06-09 PROCEDURE — 74011250636 HC RX REV CODE- 250/636: Performed by: INTERNAL MEDICINE

## 2017-06-09 PROCEDURE — 85025 COMPLETE CBC W/AUTO DIFF WBC: CPT | Performed by: INTERNAL MEDICINE

## 2017-06-09 PROCEDURE — 86140 C-REACTIVE PROTEIN: CPT | Performed by: INTERNAL MEDICINE

## 2017-06-09 PROCEDURE — 96365 THER/PROPH/DIAG IV INF INIT: CPT

## 2017-06-09 PROCEDURE — 96367 TX/PROPH/DG ADDL SEQ IV INF: CPT

## 2017-06-09 PROCEDURE — 82565 ASSAY OF CREATININE: CPT | Performed by: INTERNAL MEDICINE

## 2017-06-09 PROCEDURE — 80202 ASSAY OF VANCOMYCIN: CPT | Performed by: INTERNAL MEDICINE

## 2017-06-09 RX ADMIN — CEFTRIAXONE SODIUM 2 G: 2 INJECTION, POWDER, FOR SOLUTION INTRAMUSCULAR; INTRAVENOUS at 14:58

## 2017-06-09 RX ADMIN — VANCOMYCIN HYDROCHLORIDE 1500 MG: 500 INJECTION, POWDER, LYOPHILIZED, FOR SOLUTION INTRAVENOUS at 15:35

## 2017-06-09 NOTE — PROGRESS NOTES
[facility] VISIT NOTE    9231  Pt arrived at [facility] ambulatory with walker and in no distress for Vancomycin/Rocephin infusion. Assessment completed, no acute complaints at this time. RIght chest port was accessed 0.75 in and per MD schwarz to treat with no blood return. Pt denies pain and swelling with flushing. Port flushes easily. Recent Results (from the past 12 hour(s))   CBC WITH AUTOMATED DIFF    Collection Time: 06/09/17  3:06 PM   Result Value Ref Range    WBC 19.6 (H) 4.1 - 11.1 K/uL    RBC 3.81 (L) 4.10 - 5.70 M/uL    HGB 11.5 (L) 12.1 - 17.0 g/dL    HCT 34.5 (L) 36.6 - 50.3 %    MCV 90.6 80.0 - 99.0 FL    MCH 30.2 26.0 - 34.0 PG    MCHC 33.3 30.0 - 36.5 g/dL    RDW 14.1 11.5 - 14.5 %    PLATELET 80 (L) 793 - 400 K/uL    NEUTROPHILS PENDING %    LYMPHOCYTES PENDING %    MONOCYTES PENDING %    EOSINOPHILS PENDING %    BASOPHILS PENDING %    ABS. NEUTROPHILS PENDING K/UL    ABS. LYMPHOCYTES PENDING K/UL    ABS. MONOCYTES PENDING K/UL    ABS. EOSINOPHILS PENDING K/UL    ABS. BASOPHILS PENDING K/UL    DF PENDING    BUN    Collection Time: 06/09/17  3:06 PM   Result Value Ref Range    BUN 18 6 - 20 MG/DL   CREATININE    Collection Time: 06/09/17  3:06 PM   Result Value Ref Range    Creatinine 1.57 (H) 0.70 - 1.30 MG/DL    GFR est AA 52 (L) >60 ml/min/1.73m2    GFR est non-AA 43 (L) >60 ml/min/1.73m2   SED RATE (ESR)    Collection Time: 06/09/17  3:06 PM   Result Value Ref Range    Sed rate, automated 24 (H) 0 - 20 mm/hr   C REACTIVE PROTEIN, QT    Collection Time: 06/09/17  3:06 PM   Result Value Ref Range    C-Reactive protein <0.29 <0.60 mg/dL     Medications received:  Rocephin IV  Vancomycin IV    Patient Vitals for the past 12 hrs:   Temp Pulse Resp BP   06/09/17 1445 97.8 °F (36.6 °C) 61 18 177/66     Tolerated treatment well, no adverse reaction noted. Port flushed and secured for use tomorrow.      Patient Vitals for the past 12 hrs:   Temp Pulse Resp BP   06/09/17 1445 97.8 °F (36.6 °C) 61 18 177/66     1710  D/C'd from [facility] ambulatory with walker and in no distress accompanied by his wife. Next appointment is tomorrow at 1500.

## 2017-06-10 ENCOUNTER — HOSPITAL ENCOUNTER (OUTPATIENT)
Dept: INFUSION THERAPY | Age: 82
Discharge: HOME | End: 2017-06-10
Payer: MEDICARE

## 2017-06-10 VITALS
TEMPERATURE: 97.4 F | DIASTOLIC BLOOD PRESSURE: 62 MMHG | OXYGEN SATURATION: 97 % | HEART RATE: 55 BPM | SYSTOLIC BLOOD PRESSURE: 111 MMHG | RESPIRATION RATE: 20 BRPM

## 2017-06-10 LAB
BASOPHILS # BLD AUTO: 0 K/UL (ref 0–0.1)
BASOPHILS # BLD: 0 % (ref 0–1)
DATE LAST DOSE: ABNORMAL
DIFFERENTIAL METHOD BLD: ABNORMAL
EOSINOPHIL # BLD: 0 K/UL (ref 0–0.4)
EOSINOPHIL NFR BLD: 0 % (ref 0–7)
ERYTHROCYTE [DISTWIDTH] IN BLOOD BY AUTOMATED COUNT: 14.1 % (ref 11.5–14.5)
HCT VFR BLD AUTO: 34.5 % (ref 36.6–50.3)
HGB BLD-MCNC: 11.5 G/DL (ref 12.1–17)
LYMPHOCYTES # BLD AUTO: 6 % (ref 12–49)
LYMPHOCYTES # BLD: 1.2 K/UL (ref 0.8–3.5)
MCH RBC QN AUTO: 30.2 PG (ref 26–34)
MCHC RBC AUTO-ENTMCNC: 33.3 G/DL (ref 30–36.5)
MCV RBC AUTO: 90.6 FL (ref 80–99)
METAMYELOCYTES NFR BLD MANUAL: 2 %
MONOCYTES # BLD: 6.9 K/UL (ref 0–1)
MONOCYTES NFR BLD AUTO: 35 % (ref 5–13)
MYELOCYTES NFR BLD MANUAL: 1 %
NEUTS SEG # BLD: 11 K/UL (ref 1.8–8)
NEUTS SEG NFR BLD AUTO: 56 % (ref 32–75)
PATH REV BLD -IMP: ABNORMAL
PLATELET # BLD AUTO: 80 K/UL (ref 150–400)
RBC # BLD AUTO: 3.81 M/UL (ref 4.1–5.7)
RBC MORPH BLD: ABNORMAL
REPORTED DOSE,DOSE: ABNORMAL UNITS
REPORTED DOSE/TIME,TMG: ABNORMAL
VANCOMYCIN TROUGH SERPL-MCNC: 23.4 UG/ML (ref 5–10)
WBC # BLD AUTO: 19.6 K/UL (ref 4.1–11.1)

## 2017-06-10 PROCEDURE — 96367 TX/PROPH/DG ADDL SEQ IV INF: CPT

## 2017-06-10 PROCEDURE — 74011250636 HC RX REV CODE- 250/636: Performed by: INTERNAL MEDICINE

## 2017-06-10 PROCEDURE — 74011000258 HC RX REV CODE- 258

## 2017-06-10 PROCEDURE — 74011250636 HC RX REV CODE- 250/636

## 2017-06-10 PROCEDURE — 96366 THER/PROPH/DIAG IV INF ADDON: CPT

## 2017-06-10 PROCEDURE — 96365 THER/PROPH/DIAG IV INF INIT: CPT

## 2017-06-10 RX ORDER — SODIUM CHLORIDE 9 MG/ML
50 INJECTION, SOLUTION INTRAVENOUS ONCE
Status: COMPLETED | OUTPATIENT
Start: 2017-06-10 | End: 2017-06-10

## 2017-06-10 RX ORDER — SODIUM CHLORIDE 0.9 % (FLUSH) 0.9 %
10-40 SYRINGE (ML) INJECTION AS NEEDED
Status: ACTIVE | OUTPATIENT
Start: 2017-06-10 | End: 2017-06-11

## 2017-06-10 RX ORDER — HEPARIN 100 UNIT/ML
500 SYRINGE INTRAVENOUS AS NEEDED
Status: ACTIVE | OUTPATIENT
Start: 2017-06-10 | End: 2017-06-11

## 2017-06-10 RX ADMIN — SODIUM CHLORIDE, PRESERVATIVE FREE 500 UNITS: 5 INJECTION INTRAVENOUS at 13:08

## 2017-06-10 RX ADMIN — CEFTRIAXONE SODIUM 2 G: 2 INJECTION, POWDER, FOR SOLUTION INTRAMUSCULAR; INTRAVENOUS at 10:53

## 2017-06-10 RX ADMIN — SODIUM CHLORIDE 50 ML/HR: 900 INJECTION, SOLUTION INTRAVENOUS at 10:52

## 2017-06-10 RX ADMIN — VANCOMYCIN HYDROCHLORIDE 1500 MG: 1 INJECTION, POWDER, LYOPHILIZED, FOR SOLUTION INTRAVENOUS at 11:30

## 2017-06-10 RX ADMIN — Medication 10 ML: at 10:51

## 2017-06-10 RX ADMIN — Medication 10 ML: at 13:08

## 2017-06-11 ENCOUNTER — HOSPITAL ENCOUNTER (OUTPATIENT)
Dept: INFUSION THERAPY | Age: 82
Discharge: HOME | End: 2017-06-11
Payer: MEDICARE

## 2017-06-11 VITALS
TEMPERATURE: 98.1 F | SYSTOLIC BLOOD PRESSURE: 99 MMHG | RESPIRATION RATE: 16 BRPM | HEART RATE: 57 BPM | DIASTOLIC BLOOD PRESSURE: 61 MMHG

## 2017-06-11 PROCEDURE — 96366 THER/PROPH/DIAG IV INF ADDON: CPT

## 2017-06-11 PROCEDURE — 74011250636 HC RX REV CODE- 250/636: Performed by: INTERNAL MEDICINE

## 2017-06-11 PROCEDURE — 96367 TX/PROPH/DG ADDL SEQ IV INF: CPT

## 2017-06-11 PROCEDURE — 74011000258 HC RX REV CODE- 258: Performed by: INTERNAL MEDICINE

## 2017-06-11 PROCEDURE — 96365 THER/PROPH/DIAG IV INF INIT: CPT

## 2017-06-11 RX ORDER — HEPARIN 100 UNIT/ML
500 SYRINGE INTRAVENOUS AS NEEDED
Status: ACTIVE | OUTPATIENT
Start: 2017-06-11 | End: 2017-06-12

## 2017-06-11 RX ORDER — SODIUM CHLORIDE 0.9 % (FLUSH) 0.9 %
10-40 SYRINGE (ML) INJECTION AS NEEDED
Status: ACTIVE | OUTPATIENT
Start: 2017-06-11 | End: 2017-06-12

## 2017-06-11 RX ORDER — SODIUM CHLORIDE 9 MG/ML
50 INJECTION, SOLUTION INTRAVENOUS ONCE
Status: COMPLETED | OUTPATIENT
Start: 2017-06-11 | End: 2017-06-11

## 2017-06-11 RX ADMIN — CEFTRIAXONE SODIUM 2 G: 2 INJECTION, POWDER, FOR SOLUTION INTRAMUSCULAR; INTRAVENOUS at 10:54

## 2017-06-11 RX ADMIN — Medication 10 ML: at 13:07

## 2017-06-11 RX ADMIN — Medication 10 ML: at 10:53

## 2017-06-11 RX ADMIN — VANCOMYCIN HYDROCHLORIDE 1250 MG: 1 INJECTION, POWDER, LYOPHILIZED, FOR SOLUTION INTRAVENOUS at 11:30

## 2017-06-11 RX ADMIN — SODIUM CHLORIDE, PRESERVATIVE FREE 500 UNITS: 5 INJECTION INTRAVENOUS at 13:07

## 2017-06-11 RX ADMIN — SODIUM CHLORIDE 50 ML/HR: 900 INJECTION, SOLUTION INTRAVENOUS at 10:51

## 2017-06-11 NOTE — PROGRESS NOTES
1040 Pt arrived at [facility] ambulatory and in no distress for rocephin/ vancomycin. No new complaints voiced. Right chest port dressing intact. Flushes well with no blood return. Visit Vitals    /86 (BP 1 Location: Left arm, BP Patient Position: Sitting)    Pulse 88    Temp 98 °F (36.7 °C)    Resp 18       Medications received:  NS@ KVO  Rocephin 2 grams IV over 30 minutes  Vancomycin 1250 mg IV over 90 minutes, dose adjusted by pharmacy    Port flushed with NS and heparin, secured for tomorrow    1310 Tolerated treatment well, no adverse reaction noted. D/Cd from [facility] ambulatory and in no distress accompanied by wife.   Next appt tomorrow

## 2017-06-11 NOTE — PROGRESS NOTES
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  Current regimen:  1500 mg Q24hr in infusion center (pt was getting 750 mg Q12hr inpatient through )  Abx regimen:  vancomycin  ID Following ?: YES  Frequency of BMP?: weekly    Recent Labs      17   1506   WBC  19.6*   CREA  1.57*   BUN  18     Est CrCl: ~35 ml/min;    Temp (24hrs), Av.4 °F (36.3 °C), Min:97.4 °F (36.3 °C), Max:97.4 °F (36.3 °C)    Cultures:   neg    Goal trough = 15 - 20 mcg/mL    Recent trough history (date/time/level/dose/action taken):   - .4     Plan: Change to 1250 mg Q24hr; Recheck trough prior to dose on Wednesday, , or earlier if renal function changes significantly

## 2017-06-12 ENCOUNTER — HOSPITAL ENCOUNTER (OUTPATIENT)
Dept: INFUSION THERAPY | Age: 82
Discharge: HOME | End: 2017-06-12
Payer: MEDICARE

## 2017-06-12 VITALS
DIASTOLIC BLOOD PRESSURE: 65 MMHG | SYSTOLIC BLOOD PRESSURE: 108 MMHG | HEART RATE: 59 BPM | RESPIRATION RATE: 16 BRPM | TEMPERATURE: 97.4 F

## 2017-06-12 PROCEDURE — 96367 TX/PROPH/DG ADDL SEQ IV INF: CPT

## 2017-06-12 PROCEDURE — 74011000258 HC RX REV CODE- 258: Performed by: INTERNAL MEDICINE

## 2017-06-12 PROCEDURE — 96366 THER/PROPH/DIAG IV INF ADDON: CPT

## 2017-06-12 PROCEDURE — 74011250636 HC RX REV CODE- 250/636: Performed by: INTERNAL MEDICINE

## 2017-06-12 PROCEDURE — 96365 THER/PROPH/DIAG IV INF INIT: CPT

## 2017-06-12 RX ADMIN — VANCOMYCIN HYDROCHLORIDE 1250 MG: 500 INJECTION, POWDER, LYOPHILIZED, FOR SOLUTION INTRAVENOUS at 10:55

## 2017-06-12 RX ADMIN — CEFTRIAXONE SODIUM 2 G: 2 INJECTION, POWDER, FOR SOLUTION INTRAMUSCULAR; INTRAVENOUS at 13:07

## 2017-06-12 NOTE — PROGRESS NOTES
Problem: Infection - Risk of, Central Venous Catheter-Associated Bloodstream Infection  Goal: *Absence of infection signs and symptoms  Outcome: Progressing Towards Goal  PT arrived at [place] ambulatory with walker and in no distress for antibiotics, PT tolerated this well.

## 2017-06-12 NOTE — PROGRESS NOTES
9161  Patient arrived ambulatory and in no distress for daily antibiotic treatment. Problem focused assessment unchanged, no new concerns voiced. Vancomycin (dose adjusted per vanc trough and pharmD) and Rocephin via VAD patient tolerated this without difficulty. [VS]   Patient Vitals for the past 12 hrs:   Temp Pulse Resp BP   06/12/17 1341 97.4 °F (36.3 °C) (!) 59 16 108/65   06/12/17 0948 95.9 °F (35.5 °C) 66 18 122/69       1345  Pt left ambulatory in no distress, has a return appointment tomorrow.

## 2017-06-13 ENCOUNTER — HOSPITAL ENCOUNTER (OUTPATIENT)
Dept: INFUSION THERAPY | Age: 82
Discharge: HOME | End: 2017-06-13
Payer: MEDICARE

## 2017-06-13 VITALS
HEART RATE: 59 BPM | DIASTOLIC BLOOD PRESSURE: 71 MMHG | TEMPERATURE: 97.7 F | RESPIRATION RATE: 16 BRPM | SYSTOLIC BLOOD PRESSURE: 123 MMHG

## 2017-06-13 LAB — CREAT SERPL-MCNC: 1.56 MG/DL (ref 0.7–1.3)

## 2017-06-13 PROCEDURE — 96365 THER/PROPH/DIAG IV INF INIT: CPT

## 2017-06-13 PROCEDURE — 74011250636 HC RX REV CODE- 250/636

## 2017-06-13 PROCEDURE — 96367 TX/PROPH/DG ADDL SEQ IV INF: CPT

## 2017-06-13 PROCEDURE — 74011250636 HC RX REV CODE- 250/636: Performed by: INTERNAL MEDICINE

## 2017-06-13 PROCEDURE — 96366 THER/PROPH/DIAG IV INF ADDON: CPT

## 2017-06-13 PROCEDURE — 82565 ASSAY OF CREATININE: CPT | Performed by: INTERNAL MEDICINE

## 2017-06-13 PROCEDURE — 74011000258 HC RX REV CODE- 258: Performed by: INTERNAL MEDICINE

## 2017-06-13 RX ORDER — SODIUM CHLORIDE 0.9 % (FLUSH) 0.9 %
10-40 SYRINGE (ML) INJECTION AS NEEDED
Status: ACTIVE | OUTPATIENT
Start: 2017-06-13 | End: 2017-06-14

## 2017-06-13 RX ORDER — HEPARIN 100 UNIT/ML
500 SYRINGE INTRAVENOUS AS NEEDED
Status: ACTIVE | OUTPATIENT
Start: 2017-06-13 | End: 2017-06-14

## 2017-06-13 RX ADMIN — CEFTRIAXONE SODIUM 2 G: 2 INJECTION, POWDER, FOR SOLUTION INTRAMUSCULAR; INTRAVENOUS at 15:10

## 2017-06-13 RX ADMIN — Medication 10 ML: at 18:06

## 2017-06-13 RX ADMIN — VANCOMYCIN HYDROCHLORIDE 1250 MG: 500 INJECTION, POWDER, LYOPHILIZED, FOR SOLUTION INTRAVENOUS at 15:49

## 2017-06-13 RX ADMIN — HEPARIN SODIUM (PORCINE) LOCK FLUSH IV SOLN 100 UNIT/ML 500 UNITS: 100 SOLUTION at 18:06

## 2017-06-13 NOTE — PROGRESS NOTES
1450  Patient arrived ambulatory with walker and in no distress for daily antibiotic treatment. Problem focused assessment unchanged, no new concerns voiced. Rocephin and vancomycin  via R PAC, remains accessed from [place]. Dressing CDI, no blood return from port, labs drawn peripherally. [VS]   Patient Vitals for the past 12 hrs:   Temp Pulse Resp BP   06/13/17 1804 97.7 °F (36.5 °C) (!) 59 16 123/71   06/13/17 1453 97.9 °F (36.6 °C) 63 18 110/68     Port secured for use tomorrow. 1810  Pt left ambulatory with walker in no distress, has a return appointment tomorrow.

## 2017-06-13 NOTE — PROGRESS NOTES
Problem: Infection - Risk of, Central Venous Catheter-Associated Bloodstream Infection  Goal: *Absence of infection signs and symptoms  Outcome: Progressing Towards Goal  PT arrived to [facility] ambulatory with walker and in no distress for rocephin/vancomycin, PT tolerated this well.

## 2017-06-14 ENCOUNTER — HOSPITAL ENCOUNTER (OUTPATIENT)
Dept: INFUSION THERAPY | Age: 82
Discharge: HOME | End: 2017-06-14
Payer: MEDICARE

## 2017-06-14 VITALS
BODY MASS INDEX: 23.28 KG/M2 | WEIGHT: 162.6 LBS | SYSTOLIC BLOOD PRESSURE: 125 MMHG | DIASTOLIC BLOOD PRESSURE: 62 MMHG | OXYGEN SATURATION: 98 % | HEIGHT: 70 IN | RESPIRATION RATE: 18 BRPM | TEMPERATURE: 96.9 F | HEART RATE: 85 BPM

## 2017-06-14 LAB
CREAT SERPL-MCNC: 1.75 MG/DL (ref 0.7–1.3)
DATE LAST DOSE: ABNORMAL
REPORTED DOSE,DOSE: ABNORMAL UNITS
REPORTED DOSE/TIME,TMG: 1600
VANCOMYCIN TROUGH SERPL-MCNC: 20.6 UG/ML (ref 5–10)

## 2017-06-14 PROCEDURE — 99211 OFF/OP EST MAY X REQ PHY/QHP: CPT

## 2017-06-14 PROCEDURE — 74011250636 HC RX REV CODE- 250/636

## 2017-06-14 PROCEDURE — 96361 HYDRATE IV INFUSION ADD-ON: CPT

## 2017-06-14 PROCEDURE — 74011250636 HC RX REV CODE- 250/636: Performed by: INTERNAL MEDICINE

## 2017-06-14 PROCEDURE — 36415 COLL VENOUS BLD VENIPUNCTURE: CPT | Performed by: INTERNAL MEDICINE

## 2017-06-14 PROCEDURE — 80202 ASSAY OF VANCOMYCIN: CPT | Performed by: INTERNAL MEDICINE

## 2017-06-14 PROCEDURE — 74011000258 HC RX REV CODE- 258: Performed by: INTERNAL MEDICINE

## 2017-06-14 PROCEDURE — 96375 TX/PRO/DX INJ NEW DRUG ADDON: CPT

## 2017-06-14 PROCEDURE — 82565 ASSAY OF CREATININE: CPT | Performed by: INTERNAL MEDICINE

## 2017-06-14 PROCEDURE — 96365 THER/PROPH/DIAG IV INF INIT: CPT

## 2017-06-14 RX ORDER — SODIUM CHLORIDE 0.9 % (FLUSH) 0.9 %
5-10 SYRINGE (ML) INJECTION AS NEEDED
Status: ACTIVE | OUTPATIENT
Start: 2017-06-14 | End: 2017-06-15

## 2017-06-14 RX ORDER — HEPARIN 100 UNIT/ML
500 SYRINGE INTRAVENOUS AS NEEDED
Status: ACTIVE | OUTPATIENT
Start: 2017-06-14 | End: 2017-06-15

## 2017-06-14 RX ORDER — SODIUM CHLORIDE 9 MG/ML
10 INJECTION INTRAMUSCULAR; INTRAVENOUS; SUBCUTANEOUS AS NEEDED
Status: ACTIVE | OUTPATIENT
Start: 2017-06-14 | End: 2017-06-15

## 2017-06-14 RX ORDER — SODIUM CHLORIDE 9 MG/ML
500 INJECTION, SOLUTION INTRAVENOUS ONCE
Status: COMPLETED | OUTPATIENT
Start: 2017-06-14 | End: 2017-06-14

## 2017-06-14 RX ORDER — SODIUM CHLORIDE 9 MG/ML
25 INJECTION, SOLUTION INTRAVENOUS AS NEEDED
Status: DISPENSED | OUTPATIENT
Start: 2017-06-14 | End: 2017-06-15

## 2017-06-14 RX ADMIN — SODIUM CHLORIDE 500 ML: 900 INJECTION, SOLUTION INTRAVENOUS at 15:25

## 2017-06-14 RX ADMIN — SODIUM CHLORIDE 25 ML/HR: 900 INJECTION, SOLUTION INTRAVENOUS at 13:39

## 2017-06-14 RX ADMIN — Medication 10 ML: at 17:23

## 2017-06-14 RX ADMIN — DAPTOMYCIN 450 MG: 500 INJECTION, POWDER, LYOPHILIZED, FOR SOLUTION INTRAVENOUS at 16:46

## 2017-06-14 RX ADMIN — CEFTRIAXONE SODIUM 2 G: 2 INJECTION, POWDER, FOR SOLUTION INTRAMUSCULAR; INTRAVENOUS at 13:43

## 2017-06-14 RX ADMIN — Medication 10 ML: at 13:38

## 2017-06-14 RX ADMIN — HEPARIN SODIUM (PORCINE) LOCK FLUSH IV SOLN 100 UNIT/ML 500 UNITS: 100 SOLUTION at 17:23

## 2017-06-14 NOTE — PROGRESS NOTES
1315 pt arrived ambulatory with walker and in no distress for daily antibiotic. No new complaints voiced. Port remained accessed, dressing intact. Port flushed, no blood return noted. Labs drawn peripherally. Patient Vitals for the past 12 hrs:   Temp Pulse Resp BP SpO2   06/14/17 1723 96.9 °F (36.1 °C) 85 18 125/62 98 %   06/14/17 1317 97.4 °F (36.3 °C) 65 18 122/74 98 %     Recent Results (from the past 12 hour(s))   CREATININE    Collection Time: 06/14/17  1:28 PM   Result Value Ref Range    Creatinine 1.75 (H) 0.70 - 1.30 MG/DL    GFR est AA 46 (L) >60 ml/min/1.73m2    GFR est non-AA 38 (L) >60 ml/min/1.73m2   VANCOMYCIN, TROUGH    Collection Time: 06/14/17  1:28 PM   Result Value Ref Range    Vancomycin,trough 20.6 (HH) 5.0 - 10.0 ug/mL    Reported dose date: [id number]      Reported dose time: 1600      Reported dose: 1250 MG UNITS   medication received:  Rocephin IV  Daptomycin IV   Normal saline  Elevated creatinine noted. Dr. Carla Villa was notified, and order received to d/c vancomycin and start daptomycin 6 mg/kg IV daily. Give 500 ml of normal saline over 1 hour. Pt was informed about medication change. Pt was educated about daptomycin and pt verbalized understanding. Written instruction provided. Pt was monitored for 30 min post daptomycin administration, no adverse reaction noted. Received a phone call about critical result of vancomycin trough. Attempted to contact dr. Carla Villa again, left a msg. Also clarification order needed on stop date for Daptomycin and lab order. 1730 Pt d/peg from [facility] ambulatory and in no distress. Next appt is tomorrow.

## 2017-06-15 ENCOUNTER — HOSPITAL ENCOUNTER (OUTPATIENT)
Dept: INFUSION THERAPY | Age: 82
Discharge: HOME | End: 2017-06-15
Payer: MEDICARE

## 2017-06-15 VITALS
RESPIRATION RATE: 18 BRPM | TEMPERATURE: 97.4 F | OXYGEN SATURATION: 99 % | DIASTOLIC BLOOD PRESSURE: 65 MMHG | HEART RATE: 58 BPM | SYSTOLIC BLOOD PRESSURE: 140 MMHG

## 2017-06-15 PROCEDURE — 96375 TX/PRO/DX INJ NEW DRUG ADDON: CPT

## 2017-06-15 PROCEDURE — 74011000258 HC RX REV CODE- 258: Performed by: INTERNAL MEDICINE

## 2017-06-15 PROCEDURE — 74011250636 HC RX REV CODE- 250/636: Performed by: INTERNAL MEDICINE

## 2017-06-15 PROCEDURE — 74011250636 HC RX REV CODE- 250/636

## 2017-06-15 PROCEDURE — 96365 THER/PROPH/DIAG IV INF INIT: CPT

## 2017-06-15 RX ADMIN — CEFTRIAXONE 2 G: 2 INJECTION, POWDER, FOR SOLUTION INTRAMUSCULAR; INTRAVENOUS at 12:52

## 2017-06-15 RX ADMIN — DAPTOMYCIN 450 MG: 500 INJECTION, POWDER, LYOPHILIZED, FOR SOLUTION INTRAVENOUS at 13:29

## 2017-06-15 NOTE — PROGRESS NOTES
[facility] VISIT NOTE    0507  Pt arrived at [address] [place] [address] ambulatory and in no distress for Rocephin/Daptomycin . Assessment completed, pt c/o has no concerns    RIght chest port was accessed 0.75 in and per MD ok to treat with no blood return. Pt denies pain and swelling with flushing. Port flushes easily. Medications received:  NS IV  Rocephin IV  Daptomycin IV    Tolerated treatment well, no adverse reaction noted. Port flushed per protocol. .  Patient Vitals for the past 12 hrs:   Temp Pulse Resp BP SpO2   06/15/17 1249 97.4 °F (36.3 °C) (!) 58 18 140/65 99 %     1335  D/C'd from [address] [place] [address] ambulatory and in no distress accompanied by wife.

## 2017-06-16 ENCOUNTER — HOSPITAL ENCOUNTER (OUTPATIENT)
Dept: INFUSION THERAPY | Age: 82
Discharge: HOME | End: 2017-06-16
Payer: MEDICARE

## 2017-06-16 VITALS
RESPIRATION RATE: 18 BRPM | TEMPERATURE: 97.6 F | HEART RATE: 64 BPM | SYSTOLIC BLOOD PRESSURE: 122 MMHG | DIASTOLIC BLOOD PRESSURE: 72 MMHG

## 2017-06-16 LAB
BASOPHILS # BLD AUTO: 0 K/UL (ref 0–0.1)
BASOPHILS # BLD: 0 % (ref 0–1)
BUN SERPL-MCNC: 23 MG/DL (ref 6–20)
CREAT SERPL-MCNC: 1.78 MG/DL (ref 0.7–1.3)
CRP SERPL-MCNC: <0.29 MG/DL
DIFFERENTIAL METHOD BLD: ABNORMAL
EOSINOPHIL # BLD: 0 K/UL (ref 0–0.4)
EOSINOPHIL NFR BLD: 0 % (ref 0–7)
ERYTHROCYTE [DISTWIDTH] IN BLOOD BY AUTOMATED COUNT: 14.2 % (ref 11.5–14.5)
ERYTHROCYTE [SEDIMENTATION RATE] IN BLOOD: 25 MM/HR (ref 0–20)
HCT VFR BLD AUTO: 35.4 % (ref 36.6–50.3)
HGB BLD-MCNC: 11.4 G/DL (ref 12.1–17)
LYMPHOCYTES # BLD AUTO: 3 % (ref 12–49)
LYMPHOCYTES # BLD: 0.6 K/UL (ref 0.8–3.5)
MCH RBC QN AUTO: 30 PG (ref 26–34)
MCHC RBC AUTO-ENTMCNC: 32.2 G/DL (ref 30–36.5)
MCV RBC AUTO: 93.2 FL (ref 80–99)
METAMYELOCYTES NFR BLD MANUAL: 1 %
MONOCYTES # BLD: 5.7 K/UL (ref 0–1)
MONOCYTES NFR BLD AUTO: 29 % (ref 5–13)
MYELOCYTES NFR BLD MANUAL: 3 %
NEUTS BAND NFR BLD MANUAL: 4 % (ref 0–6)
NEUTS SEG # BLD: 12.5 K/UL (ref 1.8–8)
NEUTS SEG NFR BLD AUTO: 60 % (ref 32–75)
PLATELET # BLD AUTO: 66 K/UL (ref 150–400)
RBC # BLD AUTO: 3.8 M/UL (ref 4.1–5.7)
RBC MORPH BLD: ABNORMAL
WBC # BLD AUTO: 19.6 K/UL (ref 4.1–11.1)

## 2017-06-16 PROCEDURE — 85652 RBC SED RATE AUTOMATED: CPT | Performed by: INTERNAL MEDICINE

## 2017-06-16 PROCEDURE — 74011000258 HC RX REV CODE- 258: Performed by: INTERNAL MEDICINE

## 2017-06-16 PROCEDURE — 74011250636 HC RX REV CODE- 250/636

## 2017-06-16 PROCEDURE — 84520 ASSAY OF UREA NITROGEN: CPT | Performed by: INTERNAL MEDICINE

## 2017-06-16 PROCEDURE — 82565 ASSAY OF CREATININE: CPT | Performed by: INTERNAL MEDICINE

## 2017-06-16 PROCEDURE — 74011000250 HC RX REV CODE- 250: Performed by: INTERNAL MEDICINE

## 2017-06-16 PROCEDURE — 36415 COLL VENOUS BLD VENIPUNCTURE: CPT | Performed by: INTERNAL MEDICINE

## 2017-06-16 PROCEDURE — 77030012965 HC NDL HUBR BBMI -A

## 2017-06-16 PROCEDURE — 96365 THER/PROPH/DIAG IV INF INIT: CPT

## 2017-06-16 PROCEDURE — 85025 COMPLETE CBC W/AUTO DIFF WBC: CPT | Performed by: INTERNAL MEDICINE

## 2017-06-16 PROCEDURE — 86140 C-REACTIVE PROTEIN: CPT | Performed by: INTERNAL MEDICINE

## 2017-06-16 PROCEDURE — 96375 TX/PRO/DX INJ NEW DRUG ADDON: CPT

## 2017-06-16 PROCEDURE — 74011250636 HC RX REV CODE- 250/636: Performed by: INTERNAL MEDICINE

## 2017-06-16 RX ORDER — HEPARIN 100 UNIT/ML
500 SYRINGE INTRAVENOUS AS NEEDED
Status: ACTIVE | OUTPATIENT
Start: 2017-06-16 | End: 2017-06-17

## 2017-06-16 RX ORDER — SODIUM CHLORIDE 9 MG/ML
10 INJECTION INTRAMUSCULAR; INTRAVENOUS; SUBCUTANEOUS AS NEEDED
Status: ACTIVE | OUTPATIENT
Start: 2017-06-16 | End: 2017-06-17

## 2017-06-16 RX ORDER — SODIUM CHLORIDE 0.9 % (FLUSH) 0.9 %
10 SYRINGE (ML) INJECTION AS NEEDED
Status: ACTIVE | OUTPATIENT
Start: 2017-06-16 | End: 2017-06-17

## 2017-06-16 RX ADMIN — Medication 10 ML: at 11:50

## 2017-06-16 RX ADMIN — CEFTRIAXONE SODIUM 2 G: 2 INJECTION, POWDER, FOR SOLUTION INTRAMUSCULAR; INTRAVENOUS at 11:15

## 2017-06-16 RX ADMIN — HEPARIN SODIUM (PORCINE) LOCK FLUSH IV SOLN 100 UNIT/ML 500 UNITS: 100 SOLUTION at 11:05

## 2017-06-16 RX ADMIN — Medication 10 ML: at 11:05

## 2017-06-16 RX ADMIN — DAPTOMYCIN 450 MG: 500 INJECTION, POWDER, LYOPHILIZED, FOR SOLUTION INTRAVENOUS at 11:48

## 2017-06-16 RX ADMIN — HEPARIN SODIUM (PORCINE) LOCK FLUSH IV SOLN 100 UNIT/ML 500 UNITS: 100 SOLUTION at 11:50

## 2017-06-16 NOTE — PROGRESS NOTES
[facility] VISIT NOTE    6394  Pt arrived at [facility] ambulatory with walker and in no distress for daily Daptomycin/Rocephin. Assessment completed, no new complaints voiced at this time. Blood pressure 122/72, pulse 64, temperature 97.6 °F (36.4 °C), resp. rate 18. Right chest port present on admission with needle intact from yesterday's treatment. Port flushed and de-accessed. New port needle and dressing placed today per [facility] protocol. Labs drawn peripherally d/t no blood return to port. Medications received:  Rocephin IV  Daptomycin IVP    Tolerated treatment well, no adverse reaction noted. Port flushed and secured for treatment tomorrow. 1155  D/C'd from [facility] ambulatory with walker and in no distress accompanied by wife. Next appointment is 6/17/17 at 1100 at [facility].

## 2017-06-17 ENCOUNTER — HOSPITAL ENCOUNTER (OUTPATIENT)
Dept: INFUSION THERAPY | Age: 82
Discharge: HOME | End: 2017-06-17
Payer: MEDICARE

## 2017-06-17 VITALS
DIASTOLIC BLOOD PRESSURE: 70 MMHG | SYSTOLIC BLOOD PRESSURE: 122 MMHG | HEART RATE: 57 BPM | TEMPERATURE: 97.3 F | OXYGEN SATURATION: 97 % | RESPIRATION RATE: 18 BRPM

## 2017-06-17 PROCEDURE — 74011000250 HC RX REV CODE- 250: Performed by: INTERNAL MEDICINE

## 2017-06-17 PROCEDURE — 96375 TX/PRO/DX INJ NEW DRUG ADDON: CPT

## 2017-06-17 PROCEDURE — 74011000258 HC RX REV CODE- 258: Performed by: INTERNAL MEDICINE

## 2017-06-17 PROCEDURE — 96374 THER/PROPH/DIAG INJ IV PUSH: CPT

## 2017-06-17 PROCEDURE — 74011250636 HC RX REV CODE- 250/636: Performed by: INTERNAL MEDICINE

## 2017-06-17 PROCEDURE — 96365 THER/PROPH/DIAG IV INF INIT: CPT

## 2017-06-17 RX ORDER — HEPARIN 100 UNIT/ML
500 SYRINGE INTRAVENOUS AS NEEDED
Status: ACTIVE | OUTPATIENT
Start: 2017-06-17 | End: 2017-06-18

## 2017-06-17 RX ORDER — SODIUM CHLORIDE 0.9 % (FLUSH) 0.9 %
10-40 SYRINGE (ML) INJECTION AS NEEDED
Status: ACTIVE | OUTPATIENT
Start: 2017-06-17 | End: 2017-06-18

## 2017-06-17 RX ORDER — SODIUM CHLORIDE 9 MG/ML
25 INJECTION, SOLUTION INTRAVENOUS AS NEEDED
Status: DISPENSED | OUTPATIENT
Start: 2017-06-17 | End: 2017-06-18

## 2017-06-17 RX ADMIN — DAPTOMYCIN 450 MG: 500 INJECTION, POWDER, LYOPHILIZED, FOR SOLUTION INTRAVENOUS at 09:56

## 2017-06-17 RX ADMIN — SODIUM CHLORIDE, PRESERVATIVE FREE 500 UNITS: 5 INJECTION INTRAVENOUS at 10:02

## 2017-06-17 RX ADMIN — CEFTRIAXONE SODIUM 2 G: 2 INJECTION, POWDER, FOR SOLUTION INTRAMUSCULAR; INTRAVENOUS at 09:14

## 2017-06-17 RX ADMIN — Medication 20 ML: at 10:02

## 2017-06-17 RX ADMIN — Medication 10 ML: at 09:14

## 2017-06-17 RX ADMIN — SODIUM CHLORIDE 25 ML/HR: 900 INJECTION, SOLUTION INTRAVENOUS at 09:10

## 2017-06-17 NOTE — PROGRESS NOTES
0900 Pt arrived at [facility] ambulatory and in no distress for Dapto/Rocephin. Assessment unchanged, no new complaints voiced. Visit Vitals    /68 (BP 1 Location: Right leg, BP Patient Position: At rest)    Pulse 65    Temp 97.3 °F (36.3 °C)    Resp 18    SpO2 97%       Medications received:  NS KVO  Rocephin 2 g IV over 30 min  Daptomycin 450 mg IVP 2 min    Visit Vitals    /70 (BP 1 Location: Right arm, BP Patient Position: At rest)    Pulse (!) 57    Temp 97.3 °F (36.3 °C)    Resp 18    SpO2 97%       1005 Tolerated treatment well, no adverse reaction noted. D/Cd from [facility] ambulatory and in no distress accompanied by Spouse.   Next appt 6/18/17

## 2017-06-18 ENCOUNTER — HOSPITAL ENCOUNTER (OUTPATIENT)
Dept: INFUSION THERAPY | Age: 82
Discharge: HOME | End: 2017-06-18
Payer: MEDICARE

## 2017-06-18 VITALS
DIASTOLIC BLOOD PRESSURE: 66 MMHG | HEART RATE: 60 BPM | RESPIRATION RATE: 18 BRPM | SYSTOLIC BLOOD PRESSURE: 116 MMHG | TEMPERATURE: 97.1 F

## 2017-06-18 PROCEDURE — 74011250636 HC RX REV CODE- 250/636: Performed by: INTERNAL MEDICINE

## 2017-06-18 PROCEDURE — 74011000250 HC RX REV CODE- 250: Performed by: INTERNAL MEDICINE

## 2017-06-18 PROCEDURE — 96375 TX/PRO/DX INJ NEW DRUG ADDON: CPT

## 2017-06-18 PROCEDURE — 74011000258 HC RX REV CODE- 258: Performed by: INTERNAL MEDICINE

## 2017-06-18 PROCEDURE — 96365 THER/PROPH/DIAG IV INF INIT: CPT

## 2017-06-18 RX ORDER — SODIUM CHLORIDE 0.9 % (FLUSH) 0.9 %
10-40 SYRINGE (ML) INJECTION AS NEEDED
Status: ACTIVE | OUTPATIENT
Start: 2017-06-18 | End: 2017-06-19

## 2017-06-18 RX ORDER — HEPARIN 100 UNIT/ML
500 SYRINGE INTRAVENOUS AS NEEDED
Status: ACTIVE | OUTPATIENT
Start: 2017-06-18 | End: 2017-06-19

## 2017-06-18 RX ADMIN — Medication 10 ML: at 08:59

## 2017-06-18 RX ADMIN — DAPTOMYCIN 450 MG: 500 INJECTION, POWDER, LYOPHILIZED, FOR SOLUTION INTRAVENOUS at 09:00

## 2017-06-18 RX ADMIN — Medication 10 ML: at 08:29

## 2017-06-18 RX ADMIN — SODIUM CHLORIDE, PRESERVATIVE FREE 500 UNITS: 5 INJECTION INTRAVENOUS at 09:03

## 2017-06-18 RX ADMIN — CEFTRIAXONE SODIUM 2 G: 2 INJECTION, POWDER, FOR SOLUTION INTRAMUSCULAR; INTRAVENOUS at 08:30

## 2017-06-18 RX ADMIN — Medication 10 ML: at 09:03

## 2017-06-18 NOTE — PROGRESS NOTES
0820 Pt arrived at [facility] ambulatory and in no distress for Dapto/Rocephin. Assessment unchanged, no new complaints voiced. Visit Vitals    /68 (BP 1 Location: Right arm, BP Patient Position: At rest)    Pulse 64    Temp 97.1 °F (36.2 °C)    Resp 18       Medications received:  Rocephin 2 g IV over 30 min  Daptomycin 450 mg IVP 2 min    Visit Vitals    /66 (BP 1 Location: Right arm, BP Patient Position: At rest)    Pulse 60    Temp 97.1 °F (36.2 °C)    Resp 18     0910 Tolerated treatment well, no adverse reaction noted. D/Cd from [facility] ambulatory and in no distress accompanied by Spouse.   Next appt 6/19/17

## 2017-06-19 ENCOUNTER — HOSPITAL ENCOUNTER (OUTPATIENT)
Dept: INFUSION THERAPY | Age: 82
Discharge: HOME | End: 2017-06-19
Payer: MEDICARE

## 2017-06-19 VITALS
TEMPERATURE: 97 F | RESPIRATION RATE: 18 BRPM | DIASTOLIC BLOOD PRESSURE: 67 MMHG | HEART RATE: 69 BPM | OXYGEN SATURATION: 98 % | SYSTOLIC BLOOD PRESSURE: 102 MMHG

## 2017-06-19 PROCEDURE — 74011000250 HC RX REV CODE- 250: Performed by: INTERNAL MEDICINE

## 2017-06-19 PROCEDURE — 74011250636 HC RX REV CODE- 250/636

## 2017-06-19 PROCEDURE — 96365 THER/PROPH/DIAG IV INF INIT: CPT

## 2017-06-19 PROCEDURE — 74011000258 HC RX REV CODE- 258: Performed by: INTERNAL MEDICINE

## 2017-06-19 PROCEDURE — 74011250636 HC RX REV CODE- 250/636: Performed by: INTERNAL MEDICINE

## 2017-06-19 PROCEDURE — 96375 TX/PRO/DX INJ NEW DRUG ADDON: CPT

## 2017-06-19 RX ADMIN — CEFTRIAXONE SODIUM 2 G: 2 INJECTION, POWDER, FOR SOLUTION INTRAMUSCULAR; INTRAVENOUS at 07:50

## 2017-06-19 RX ADMIN — DAPTOMYCIN 450 MG: 500 INJECTION, POWDER, LYOPHILIZED, FOR SOLUTION INTRAVENOUS at 08:18

## 2017-06-19 NOTE — PROGRESS NOTES
[facility] VISIT NOTE    0730  Pt arrived at [facility] ambulatory and in no distress for Rocephin/Daptomycin . Assessment completed, pt c/o has no concerns    RIght chest port was accessed 0.75 in and ok to treat with no blood return. Pt denies pain and swelling with flushing. Port flushes easily. Medications received:  NS IV  Rocephin IV  Daptomycin IV    Tolerated treatment well, no adverse reaction noted. Port flushed per protocol. .  Patient Vitals for the past 12 hrs:   Temp Pulse Resp BP SpO2   06/19/17 0728 97 °F (36.1 °C) 69 18 102/67 98 %     0830  D/C'd from [facility] ambulatory and in no distress accompanied by wife.

## 2017-06-20 ENCOUNTER — HOSPITAL ENCOUNTER (OUTPATIENT)
Dept: INFUSION THERAPY | Age: 82
Discharge: HOME | End: 2017-06-20
Payer: MEDICARE

## 2017-06-20 VITALS
RESPIRATION RATE: 18 BRPM | HEART RATE: 66 BPM | OXYGEN SATURATION: 97 % | SYSTOLIC BLOOD PRESSURE: 114 MMHG | TEMPERATURE: 98.2 F | DIASTOLIC BLOOD PRESSURE: 64 MMHG

## 2017-06-20 PROCEDURE — 74011000250 HC RX REV CODE- 250: Performed by: INTERNAL MEDICINE

## 2017-06-20 PROCEDURE — 74011250636 HC RX REV CODE- 250/636: Performed by: INTERNAL MEDICINE

## 2017-06-20 PROCEDURE — 74011250636 HC RX REV CODE- 250/636

## 2017-06-20 PROCEDURE — 96375 TX/PRO/DX INJ NEW DRUG ADDON: CPT

## 2017-06-20 PROCEDURE — 74011000258 HC RX REV CODE- 258: Performed by: INTERNAL MEDICINE

## 2017-06-20 PROCEDURE — 96365 THER/PROPH/DIAG IV INF INIT: CPT

## 2017-06-20 RX ORDER — HEPARIN 100 UNIT/ML
500 SYRINGE INTRAVENOUS AS NEEDED
Status: ACTIVE | OUTPATIENT
Start: 2017-06-20 | End: 2017-06-21

## 2017-06-20 RX ORDER — SODIUM CHLORIDE 0.9 % (FLUSH) 0.9 %
5-10 SYRINGE (ML) INJECTION AS NEEDED
Status: ACTIVE | OUTPATIENT
Start: 2017-06-20 | End: 2017-06-21

## 2017-06-20 RX ADMIN — SODIUM CHLORIDE, PRESERVATIVE FREE 500 UNITS: 5 INJECTION INTRAVENOUS at 12:06

## 2017-06-20 RX ADMIN — Medication 10 ML: at 12:06

## 2017-06-20 RX ADMIN — DAPTOMYCIN 450 MG: 500 INJECTION, POWDER, LYOPHILIZED, FOR SOLUTION INTRAVENOUS at 12:02

## 2017-06-20 RX ADMIN — CEFTRIAXONE SODIUM 2 G: 2 INJECTION, POWDER, FOR SOLUTION INTRAMUSCULAR; INTRAVENOUS at 11:27

## 2017-06-20 NOTE — PROGRESS NOTES
[facility] Progress Note    Date: 2017    Name: Nancy Castle    MRN: [id number]         : 1935      1100 Pt arrived ambulatory and in no distress for IV antibiotics. Assessment complete, no new complaints voiced. Port accessed already, port flushed. Medications received:  cefTRIAXone (ROCEPHIN) 2 g in 0.9% sodium chloride (MBP/ADV) 50 mL  DAPTOmycin (CUBICIN) 450 mg in sterile water (preservative free) 9 mL IV syringe RF     Port left in place, port flushed and heparinized, alcohol cap placed. 1210 Discharged home ambulatory and in no distress, accompanied by family. Next appointment 17.        Emelyn Anderson RN  2017

## 2017-06-21 ENCOUNTER — HOSPITAL ENCOUNTER (OUTPATIENT)
Dept: INFUSION THERAPY | Age: 82
Discharge: HOME | End: 2017-06-21
Payer: MEDICARE

## 2017-06-21 VITALS
TEMPERATURE: 97.6 F | RESPIRATION RATE: 18 BRPM | DIASTOLIC BLOOD PRESSURE: 56 MMHG | SYSTOLIC BLOOD PRESSURE: 120 MMHG | HEART RATE: 67 BPM

## 2017-06-21 PROCEDURE — 74011250636 HC RX REV CODE- 250/636

## 2017-06-21 PROCEDURE — 96365 THER/PROPH/DIAG IV INF INIT: CPT

## 2017-06-21 PROCEDURE — 74011000258 HC RX REV CODE- 258: Performed by: INTERNAL MEDICINE

## 2017-06-21 PROCEDURE — 74011250636 HC RX REV CODE- 250/636: Performed by: INTERNAL MEDICINE

## 2017-06-21 PROCEDURE — 96375 TX/PRO/DX INJ NEW DRUG ADDON: CPT

## 2017-06-21 PROCEDURE — 74011000250 HC RX REV CODE- 250: Performed by: INTERNAL MEDICINE

## 2017-06-21 RX ORDER — SODIUM CHLORIDE 9 MG/ML
10 INJECTION INTRAMUSCULAR; INTRAVENOUS; SUBCUTANEOUS AS NEEDED
Status: CANCELLED | OUTPATIENT
Start: 2017-06-21 | End: 2017-06-22

## 2017-06-21 RX ORDER — SODIUM CHLORIDE 900 MG/100ML
INJECTION INTRAVENOUS
Status: DISPENSED
Start: 2017-06-21 | End: 2017-06-22

## 2017-06-21 RX ORDER — HEPARIN 100 UNIT/ML
500 SYRINGE INTRAVENOUS AS NEEDED
Status: CANCELLED | OUTPATIENT
Start: 2017-06-21 | End: 2017-06-22

## 2017-06-21 RX ORDER — SODIUM CHLORIDE 0.9 % (FLUSH) 0.9 %
5-10 SYRINGE (ML) INJECTION AS NEEDED
Status: CANCELLED | OUTPATIENT
Start: 2017-06-21 | End: 2017-06-22

## 2017-06-21 RX ADMIN — DAPTOMYCIN 450 MG: 500 INJECTION, POWDER, LYOPHILIZED, FOR SOLUTION INTRAVENOUS at 14:43

## 2017-06-21 RX ADMIN — CEFTRIAXONE SODIUM 2 G: 2 INJECTION, POWDER, FOR SOLUTION INTRAMUSCULAR; INTRAVENOUS at 14:25

## 2017-06-21 NOTE — PROGRESS NOTES
Outpatient Infusion Center Progress Note    1428 Pt admit to [facility] for Rocephin/Daptomycin ambulatory in stable condition. Assessment completed. No new concerns voiced. Port already accessed, flushed with no blood return, orders ok to treat with out. Visit Vitals    /56    Pulse 67    Temp 97.6 °F (36.4 °C)    Resp 18       Medications:  Rocephin  Daptomycin    1500 Pt tolerated treatment well. Port flushed per protocol and capped. D/c home ambulatory in no distress. Pt aware of next appointment scheduled for 6/22/17.

## 2017-06-22 ENCOUNTER — HOSPITAL ENCOUNTER (OUTPATIENT)
Dept: INFUSION THERAPY | Age: 82
Discharge: HOME | End: 2017-06-22
Payer: MEDICARE

## 2017-06-22 VITALS
HEART RATE: 70 BPM | DIASTOLIC BLOOD PRESSURE: 74 MMHG | RESPIRATION RATE: 18 BRPM | SYSTOLIC BLOOD PRESSURE: 127 MMHG | TEMPERATURE: 97 F

## 2017-06-22 PROCEDURE — 96375 TX/PRO/DX INJ NEW DRUG ADDON: CPT

## 2017-06-22 PROCEDURE — 74011250636 HC RX REV CODE- 250/636

## 2017-06-22 PROCEDURE — 74011000250 HC RX REV CODE- 250: Performed by: INTERNAL MEDICINE

## 2017-06-22 PROCEDURE — 74011250636 HC RX REV CODE- 250/636: Performed by: INTERNAL MEDICINE

## 2017-06-22 PROCEDURE — 96365 THER/PROPH/DIAG IV INF INIT: CPT

## 2017-06-22 PROCEDURE — 74011000258 HC RX REV CODE- 258: Performed by: INTERNAL MEDICINE

## 2017-06-22 RX ADMIN — DAPTOMYCIN 450 MG: 500 INJECTION, POWDER, LYOPHILIZED, FOR SOLUTION INTRAVENOUS at 10:40

## 2017-06-22 RX ADMIN — CEFTRIAXONE SODIUM 2 G: 2 INJECTION, POWDER, FOR SOLUTION INTRAMUSCULAR; INTRAVENOUS at 09:55

## 2017-06-22 NOTE — PROGRESS NOTES
[facility] VISIT NOTE    1986  Pt arrived at [facility] ambulatory and in no distress for Rocephin/Daptomycin . Assessment completed, pt c/o has no concerns    RIght chest port was accessed prior  0.75 in and ok to treat with no blood return. Pt denies pain and swelling with flushing. Port flushes easily. Medications received:  NS IV  Rocephin IV  Daptomycin IV    Tolerated treatment well, no adverse reaction noted. Port flushed per protocol. .  Patient Vitals for the past 12 hrs:   Temp Pulse Resp BP   06/22/17 0949 97 °F (36.1 °C) 70 18 127/74   1040  D/C'd from [facility] ambulatory and in no distress accompanied by wife.

## 2017-06-23 ENCOUNTER — HOSPITAL ENCOUNTER (OUTPATIENT)
Dept: INFUSION THERAPY | Age: 82
Discharge: HOME | End: 2017-06-23
Payer: MEDICARE

## 2017-06-23 VITALS
DIASTOLIC BLOOD PRESSURE: 70 MMHG | SYSTOLIC BLOOD PRESSURE: 133 MMHG | HEART RATE: 65 BPM | RESPIRATION RATE: 16 BRPM | TEMPERATURE: 97.3 F

## 2017-06-23 LAB
BASOPHILS # BLD AUTO: 0 K/UL (ref 0–0.1)
BASOPHILS # BLD: 0 % (ref 0–1)
BUN SERPL-MCNC: 22 MG/DL (ref 6–20)
CREAT SERPL-MCNC: 1.76 MG/DL (ref 0.7–1.3)
CRP SERPL-MCNC: <0.29 MG/DL
DIFFERENTIAL METHOD BLD: ABNORMAL
EOSINOPHIL # BLD: 0.4 K/UL (ref 0–0.4)
EOSINOPHIL NFR BLD: 2 % (ref 0–7)
ERYTHROCYTE [DISTWIDTH] IN BLOOD BY AUTOMATED COUNT: 14.4 % (ref 11.5–14.5)
ERYTHROCYTE [SEDIMENTATION RATE] IN BLOOD: 17 MM/HR (ref 0–20)
HCT VFR BLD AUTO: 35.7 % (ref 36.6–50.3)
HGB BLD-MCNC: 11.6 G/DL (ref 12.1–17)
LYMPHOCYTES # BLD AUTO: 7 % (ref 12–49)
LYMPHOCYTES # BLD: 1.3 K/UL (ref 0.8–3.5)
MCH RBC QN AUTO: 30.4 PG (ref 26–34)
MCHC RBC AUTO-ENTMCNC: 32.5 G/DL (ref 30–36.5)
MCV RBC AUTO: 93.7 FL (ref 80–99)
MONOCYTES # BLD: 7.7 K/UL (ref 0–1)
MONOCYTES NFR BLD AUTO: 42 % (ref 5–13)
NEUTS SEG # BLD: 9 K/UL (ref 1.8–8)
NEUTS SEG NFR BLD AUTO: 49 % (ref 32–75)
PLATELET # BLD AUTO: 62 K/UL (ref 150–400)
RBC # BLD AUTO: 3.81 M/UL (ref 4.1–5.7)
RBC MORPH BLD: ABNORMAL
WBC # BLD AUTO: 18.4 K/UL (ref 4.1–11.1)

## 2017-06-23 PROCEDURE — 74011000258 HC RX REV CODE- 258: Performed by: INTERNAL MEDICINE

## 2017-06-23 PROCEDURE — 74011250636 HC RX REV CODE- 250/636: Performed by: INTERNAL MEDICINE

## 2017-06-23 PROCEDURE — 74011000250 HC RX REV CODE- 250: Performed by: INTERNAL MEDICINE

## 2017-06-23 PROCEDURE — 96365 THER/PROPH/DIAG IV INF INIT: CPT

## 2017-06-23 PROCEDURE — 96375 TX/PRO/DX INJ NEW DRUG ADDON: CPT

## 2017-06-23 PROCEDURE — 74011250636 HC RX REV CODE- 250/636

## 2017-06-23 PROCEDURE — 85652 RBC SED RATE AUTOMATED: CPT | Performed by: INTERNAL MEDICINE

## 2017-06-23 PROCEDURE — 86140 C-REACTIVE PROTEIN: CPT | Performed by: INTERNAL MEDICINE

## 2017-06-23 PROCEDURE — 82565 ASSAY OF CREATININE: CPT | Performed by: INTERNAL MEDICINE

## 2017-06-23 PROCEDURE — 85025 COMPLETE CBC W/AUTO DIFF WBC: CPT | Performed by: INTERNAL MEDICINE

## 2017-06-23 PROCEDURE — 84520 ASSAY OF UREA NITROGEN: CPT | Performed by: INTERNAL MEDICINE

## 2017-06-23 PROCEDURE — 36415 COLL VENOUS BLD VENIPUNCTURE: CPT | Performed by: INTERNAL MEDICINE

## 2017-06-23 RX ADMIN — DAPTOMYCIN 450 MG: 500 INJECTION, POWDER, LYOPHILIZED, FOR SOLUTION INTRAVENOUS at 08:31

## 2017-06-23 RX ADMIN — CEFTRIAXONE SODIUM 2 G: 2 INJECTION, POWDER, FOR SOLUTION INTRAMUSCULAR; INTRAVENOUS at 07:41

## 2017-06-24 ENCOUNTER — HOSPITAL ENCOUNTER (OUTPATIENT)
Dept: INFUSION THERAPY | Age: 82
Discharge: HOME | End: 2017-06-24
Payer: MEDICARE

## 2017-06-24 VITALS
HEART RATE: 71 BPM | SYSTOLIC BLOOD PRESSURE: 142 MMHG | DIASTOLIC BLOOD PRESSURE: 80 MMHG | RESPIRATION RATE: 16 BRPM | TEMPERATURE: 98 F

## 2017-06-24 PROCEDURE — 74011250636 HC RX REV CODE- 250/636: Performed by: INTERNAL MEDICINE

## 2017-06-24 PROCEDURE — 74011000258 HC RX REV CODE- 258: Performed by: INTERNAL MEDICINE

## 2017-06-24 PROCEDURE — 74011000250 HC RX REV CODE- 250: Performed by: INTERNAL MEDICINE

## 2017-06-24 PROCEDURE — 96375 TX/PRO/DX INJ NEW DRUG ADDON: CPT

## 2017-06-24 PROCEDURE — 96365 THER/PROPH/DIAG IV INF INIT: CPT

## 2017-06-24 RX ADMIN — DAPTOMYCIN 450 MG: 500 INJECTION, POWDER, LYOPHILIZED, FOR SOLUTION INTRAVENOUS at 11:03

## 2017-06-24 RX ADMIN — CEFTRIAXONE SODIUM 2 G: 2 INJECTION, POWDER, FOR SOLUTION INTRAMUSCULAR; INTRAVENOUS at 11:04

## 2017-06-24 NOTE — PROGRESS NOTES
[facility] VISIT NOTE    1100  Pt arrived at [facility] ambulatory and in no distress for Rocephin/Daptomycin . Assessment completed, pt c/o has no concerns    RIght chest port was accessed  0.75 in and ok to treat with no blood return. Pt denies pain and swelling with flushing. Port flushes easily. Medications received:  NS IV  Rocephin IV  Daptomycin IV    Tolerated treatment well, no adverse reaction noted. Port flushed per protocol. .  Patient Vitals for the past 12 hrs:   Temp Pulse Resp BP   06/24/17 1101 98 °F (36.7 °C) 71 16 142/80     1140  D/C'd from [facility] ambulatory and in no distress accompanied by wife.

## 2017-06-25 ENCOUNTER — HOSPITAL ENCOUNTER (OUTPATIENT)
Dept: INFUSION THERAPY | Age: 82
Discharge: HOME | End: 2017-06-25
Payer: MEDICARE

## 2017-06-25 VITALS
TEMPERATURE: 98.2 F | HEART RATE: 71 BPM | SYSTOLIC BLOOD PRESSURE: 126 MMHG | DIASTOLIC BLOOD PRESSURE: 67 MMHG | RESPIRATION RATE: 18 BRPM

## 2017-06-25 PROCEDURE — 74011000258 HC RX REV CODE- 258: Performed by: INTERNAL MEDICINE

## 2017-06-25 PROCEDURE — 74011000250 HC RX REV CODE- 250: Performed by: INTERNAL MEDICINE

## 2017-06-25 PROCEDURE — 74011250636 HC RX REV CODE- 250/636: Performed by: INTERNAL MEDICINE

## 2017-06-25 PROCEDURE — 74011250636 HC RX REV CODE- 250/636

## 2017-06-25 PROCEDURE — 96365 THER/PROPH/DIAG IV INF INIT: CPT

## 2017-06-25 PROCEDURE — 96375 TX/PRO/DX INJ NEW DRUG ADDON: CPT

## 2017-06-25 RX ORDER — SODIUM CHLORIDE 0.9 % (FLUSH) 0.9 %
5-10 SYRINGE (ML) INJECTION AS NEEDED
Status: ACTIVE | OUTPATIENT
Start: 2017-06-25 | End: 2017-06-26

## 2017-06-25 RX ORDER — HEPARIN 100 UNIT/ML
500 SYRINGE INTRAVENOUS AS NEEDED
Status: ACTIVE | OUTPATIENT
Start: 2017-06-25 | End: 2017-06-26

## 2017-06-25 RX ADMIN — DAPTOMYCIN 450 MG: 500 INJECTION, POWDER, LYOPHILIZED, FOR SOLUTION INTRAVENOUS at 11:07

## 2017-06-25 RX ADMIN — Medication 10 ML: at 11:06

## 2017-06-25 RX ADMIN — CEFTRIAXONE SODIUM 2 G: 2 INJECTION, POWDER, FOR SOLUTION INTRAMUSCULAR; INTRAVENOUS at 11:10

## 2017-06-25 RX ADMIN — SODIUM CHLORIDE, PRESERVATIVE FREE 500 UNITS: 5 INJECTION INTRAVENOUS at 11:40

## 2017-06-25 RX ADMIN — Medication 10 ML: at 11:40

## 2017-06-25 RX ADMIN — Medication 10 ML: at 11:10

## 2017-06-25 NOTE — PROGRESS NOTES
1105 pt arrived to [facility] ambulatory with walker. Pt denies any distress or discomfort at this time. Port accessed with gill previously noted. No blood return noted, flushed without difficulty. Visit Vitals    /67 (BP 1 Location: Left arm, BP Patient Position: At rest)    Pulse 71    Temp 98.2 °F (36.8 °C)    Resp 18     daptomycin 450 mg IV given over 3 min. Rocephin 2 gm IV given over 30 min. Messedamm 28 flushed with normal saline and heparin then secured. Pt denies any distress or discomfort at this time.  Pt discharged home ambulatory with next apt

## 2017-06-26 ENCOUNTER — HOSPITAL ENCOUNTER (OUTPATIENT)
Dept: INFUSION THERAPY | Age: 82
Discharge: HOME | End: 2017-06-26
Payer: MEDICARE

## 2017-06-26 VITALS
DIASTOLIC BLOOD PRESSURE: 72 MMHG | RESPIRATION RATE: 16 BRPM | TEMPERATURE: 97.5 F | SYSTOLIC BLOOD PRESSURE: 112 MMHG | HEART RATE: 62 BPM

## 2017-06-26 PROCEDURE — 74011250636 HC RX REV CODE- 250/636: Performed by: INTERNAL MEDICINE

## 2017-06-26 PROCEDURE — 74011250636 HC RX REV CODE- 250/636

## 2017-06-26 PROCEDURE — 74011000258 HC RX REV CODE- 258: Performed by: INTERNAL MEDICINE

## 2017-06-26 PROCEDURE — 96365 THER/PROPH/DIAG IV INF INIT: CPT

## 2017-06-26 PROCEDURE — 74011000250 HC RX REV CODE- 250: Performed by: INTERNAL MEDICINE

## 2017-06-26 PROCEDURE — 96375 TX/PRO/DX INJ NEW DRUG ADDON: CPT

## 2017-06-26 RX ORDER — SODIUM CHLORIDE 9 MG/ML
10 INJECTION INTRAMUSCULAR; INTRAVENOUS; SUBCUTANEOUS AS NEEDED
Status: ACTIVE | OUTPATIENT
Start: 2017-06-26 | End: 2017-06-27

## 2017-06-26 RX ORDER — HEPARIN 100 UNIT/ML
500 SYRINGE INTRAVENOUS AS NEEDED
Status: ACTIVE | OUTPATIENT
Start: 2017-06-26 | End: 2017-06-27

## 2017-06-26 RX ORDER — SODIUM CHLORIDE 0.9 % (FLUSH) 0.9 %
10 SYRINGE (ML) INJECTION AS NEEDED
Status: ACTIVE | OUTPATIENT
Start: 2017-06-26 | End: 2017-06-27

## 2017-06-26 RX ORDER — SODIUM CHLORIDE 9 MG/ML
50 INJECTION, SOLUTION INTRAVENOUS AS NEEDED
Status: DISPENSED | OUTPATIENT
Start: 2017-06-26 | End: 2017-06-27

## 2017-06-26 RX ADMIN — DAPTOMYCIN 450 MG: 500 INJECTION, POWDER, LYOPHILIZED, FOR SOLUTION INTRAVENOUS at 12:11

## 2017-06-26 RX ADMIN — Medication 10 ML: at 12:11

## 2017-06-26 RX ADMIN — SODIUM CHLORIDE, PRESERVATIVE FREE 500 UNITS: 5 INJECTION INTRAVENOUS at 12:11

## 2017-06-26 RX ADMIN — Medication 10 ML: at 12:10

## 2017-06-26 RX ADMIN — CEFTRIAXONE SODIUM 2 G: 2 INJECTION, POWDER, FOR SOLUTION INTRAMUSCULAR; INTRAVENOUS at 11:28

## 2017-06-26 NOTE — PROGRESS NOTES
Outpatient Infusion Center Short Visit Progress Note    1100 Pt admit to [facility] for daily antibiotics ambulatory in stable condition. Assessment completed. No new concerns voiced. Per RN - ok to use without blood return. Visit Vitals    /72    Pulse 62    Temp 97.5 °F (36.4 °C)    Resp 16       Portacathwithout positive blood return. Medications:  Rocephin IV  Daptomycin IVP    Pt scheduled to complete treatment on 6/28/17 and see the MD after [facility] appointment. 1230 Pt tolerated treatment well. D/c home ambulatory in no distress. Pt aware of next appointment scheduled for 6/27/17.

## 2017-06-27 ENCOUNTER — HOSPITAL ENCOUNTER (OUTPATIENT)
Dept: INFUSION THERAPY | Age: 82
Discharge: HOME | End: 2017-06-27
Payer: MEDICARE

## 2017-06-27 VITALS
DIASTOLIC BLOOD PRESSURE: 67 MMHG | TEMPERATURE: 97.8 F | SYSTOLIC BLOOD PRESSURE: 116 MMHG | RESPIRATION RATE: 18 BRPM | HEART RATE: 63 BPM

## 2017-06-27 PROCEDURE — 74011250636 HC RX REV CODE- 250/636

## 2017-06-27 PROCEDURE — 74011250636 HC RX REV CODE- 250/636: Performed by: INTERNAL MEDICINE

## 2017-06-27 PROCEDURE — 96365 THER/PROPH/DIAG IV INF INIT: CPT

## 2017-06-27 PROCEDURE — 74011000250 HC RX REV CODE- 250: Performed by: INTERNAL MEDICINE

## 2017-06-27 PROCEDURE — 74011000258 HC RX REV CODE- 258: Performed by: INTERNAL MEDICINE

## 2017-06-27 PROCEDURE — 96375 TX/PRO/DX INJ NEW DRUG ADDON: CPT

## 2017-06-27 RX ORDER — SODIUM CHLORIDE 9 MG/ML
10 INJECTION INTRAMUSCULAR; INTRAVENOUS; SUBCUTANEOUS AS NEEDED
Status: ACTIVE | OUTPATIENT
Start: 2017-06-27 | End: 2017-06-28

## 2017-06-27 RX ORDER — SODIUM CHLORIDE 9 MG/ML
50 INJECTION, SOLUTION INTRAVENOUS AS NEEDED
Status: DISPENSED | OUTPATIENT
Start: 2017-06-27 | End: 2017-06-28

## 2017-06-27 RX ORDER — HEPARIN 100 UNIT/ML
500 SYRINGE INTRAVENOUS AS NEEDED
Status: ACTIVE | OUTPATIENT
Start: 2017-06-27 | End: 2017-06-28

## 2017-06-27 RX ORDER — SODIUM CHLORIDE 0.9 % (FLUSH) 0.9 %
5-10 SYRINGE (ML) INJECTION AS NEEDED
Status: ACTIVE | OUTPATIENT
Start: 2017-06-27 | End: 2017-06-28

## 2017-06-27 RX ADMIN — SODIUM CHLORIDE 50 ML/HR: 900 INJECTION, SOLUTION INTRAVENOUS at 11:09

## 2017-06-27 RX ADMIN — DAPTOMYCIN 450 MG: 500 INJECTION, POWDER, LYOPHILIZED, FOR SOLUTION INTRAVENOUS at 12:06

## 2017-06-27 RX ADMIN — CEFTRIAXONE SODIUM 2 G: 2 INJECTION, POWDER, FOR SOLUTION INTRAMUSCULAR; INTRAVENOUS at 11:24

## 2017-06-27 RX ADMIN — Medication 10 ML: at 12:07

## 2017-06-27 RX ADMIN — SODIUM CHLORIDE, PRESERVATIVE FREE 500 UNITS: 5 INJECTION INTRAVENOUS at 12:07

## 2017-06-27 RX ADMIN — Medication 10 ML: at 12:05

## 2017-06-27 RX ADMIN — Medication 10 ML: at 11:09

## 2017-06-27 NOTE — PROGRESS NOTES
Outpatient Infusion Center Progress Note    1100 Pt admit to [facility] for antibiotics ambulatory with walker in stable condition. Assessment completed. No new concerns voiced. Port accessed prior and flushed without blood return. IV attached to NS at [address] /67 (BP 1 Location: Right arm, BP Patient Position: At rest)    Pulse 63    Temp 97.8 °F (36.6 °C)    Resp 18       Medications:  NS  Rocephin 2gm IVPB  Gentamycin 450mg IVP    1205 Pt tolerated treatment well. Port flushed and heparinized for tomorrow's infusion. D/c home ambulatory in no distress. Pt aware of next appointment scheduled for 6/28/17.

## 2017-06-28 ENCOUNTER — HOSPITAL ENCOUNTER (OUTPATIENT)
Dept: INFUSION THERAPY | Age: 82
Discharge: HOME | End: 2017-06-28
Payer: MEDICARE

## 2017-06-28 VITALS
RESPIRATION RATE: 16 BRPM | SYSTOLIC BLOOD PRESSURE: 119 MMHG | HEART RATE: 65 BPM | TEMPERATURE: 98.3 F | OXYGEN SATURATION: 99 % | DIASTOLIC BLOOD PRESSURE: 74 MMHG

## 2017-06-28 PROCEDURE — 74011000250 HC RX REV CODE- 250: Performed by: INTERNAL MEDICINE

## 2017-06-28 PROCEDURE — 74011250636 HC RX REV CODE- 250/636

## 2017-06-28 PROCEDURE — 96523 IRRIG DRUG DELIVERY DEVICE: CPT

## 2017-06-28 PROCEDURE — 96365 THER/PROPH/DIAG IV INF INIT: CPT

## 2017-06-28 PROCEDURE — 74011000258 HC RX REV CODE- 258: Performed by: INTERNAL MEDICINE

## 2017-06-28 PROCEDURE — 74011250636 HC RX REV CODE- 250/636: Performed by: INTERNAL MEDICINE

## 2017-06-28 PROCEDURE — 96375 TX/PRO/DX INJ NEW DRUG ADDON: CPT

## 2017-06-28 RX ORDER — SODIUM CHLORIDE 0.9 % (FLUSH) 0.9 %
10-40 SYRINGE (ML) INJECTION AS NEEDED
Status: ACTIVE | OUTPATIENT
Start: 2017-06-28 | End: 2017-06-29

## 2017-06-28 RX ORDER — SODIUM CHLORIDE 9 MG/ML
10 INJECTION INTRAMUSCULAR; INTRAVENOUS; SUBCUTANEOUS AS NEEDED
Status: ACTIVE | OUTPATIENT
Start: 2017-06-28 | End: 2017-06-29

## 2017-06-28 RX ORDER — HEPARIN 100 UNIT/ML
500 SYRINGE INTRAVENOUS AS NEEDED
Status: ACTIVE | OUTPATIENT
Start: 2017-06-28 | End: 2017-06-29

## 2017-06-28 RX ADMIN — CEFTRIAXONE SODIUM 2 G: 2 INJECTION, POWDER, FOR SOLUTION INTRAMUSCULAR; INTRAVENOUS at 14:04

## 2017-06-28 RX ADMIN — Medication 40 ML: at 14:47

## 2017-06-28 RX ADMIN — Medication 10 ML: at 14:03

## 2017-06-28 RX ADMIN — SODIUM CHLORIDE, PRESERVATIVE FREE 500 UNITS: 5 INJECTION INTRAVENOUS at 14:47

## 2017-06-28 RX ADMIN — DAPTOMYCIN 450 MG: 500 INJECTION, POWDER, LYOPHILIZED, FOR SOLUTION INTRAVENOUS at 14:43

## 2017-06-28 NOTE — DISCHARGE SUMMARY
Physician Discharge Summary     Patient ID:    Bee Crespo  [id number]  81 y.o.  1935    Admit date: 5/31/2017    Discharge date and time: 6/28/2017    Admission Diagnoses: NEUROPATHIC ULCER RIGHT FOOT   Infection of right foot  Diabetes 1.5, managed as type 2 ([facility]  Neuropathy    Chronic Diagnoses:    Problem List as of 6/5/2017  Date Reviewed: 5/31/2017          Codes Class Noted - Resolved    CKD (chronic kidney disease) stage 3, GFR 30-59 ml/min (Chronic) ICD-10-CM: N18.3  ICD-9-CM: 585.3  6/5/2017 - Present        CMML (chronic myelomonocytic leukemia) ([facility] (Chronic) ICD-10-CM: C93.10  ICD-9-CM: 205.10  6/3/2017 - Present        Neuropathy (Chronic) ICD-10-CM: G62.9  ICD-9-CM: 355.9  5/31/2017 - Present        Infection of right foot ICD-10-CM: L08.9  ICD-9-CM: 686.9  5/31/2017 - Present        Unspecified hypothyroidism (Chronic) ICD-10-CM: E03.9  ICD-9-CM: 244.9  5/31/2017 - Present        Hyperlipidemia (Chronic) ICD-10-CM: E78.5  ICD-9-CM: 272.4  5/31/2017 - Present              Discharge Medications: Cannot display discharge medications since this patient is not currently admitted. Follow up Care:    1. Marissa Kawasaki, MD in 1-2 weeks  2. Eugenio Miranda MD      Diet:  Regular Diet    Disposition:  Home. Advanced Directive:    Discharge Exam:  See today's note. CONSULTATIONS: id    Significant Diagnostic Studies:   No results for input(s): WBC, HGB, HCT, PLT, HGBEXT, HCTEXT, PLTEXT in the last 72 hours. No results for input(s): NA, K, CL, CO2, BUN, CREA, GLU, CA, MG, PHOS, URICA in the last 72 hours. No results for input(s): SGOT, GPT, AP, TBIL, TP, ALB, GLOB, GGT, AML, LPSE in the last 72 hours. No lab exists for component: AMYP, HLPSE  No results for input(s): INR, PTP, APTT in the last 72 hours. No lab exists for component: INREXT   No results for input(s): FE, TIBC, PSAT, FERR in the last 72 hours. No results for input(s): PH, PCO2, PO2 in the last 72 hours.   No results for input(s): CPK, CKMB in the last 72 hours. No lab exists for component: TROPONINI  No components found for: 5 [facility]:   1. Surgical debridement  2.  Am[utation 5th toe      Signed:  Vel Fitch MD  6/28/2017  9:56 AM

## 2017-06-28 NOTE — PROGRESS NOTES
[facility] VISIT NOTE    Pt arrived at [facility] ambulatory and in no distress for Ceftriaxone/Daptomycin. Assessment completed, pt had no complaints. .     Patient arrived with right chest port needle in place. Dressing dry and intact. Flushed per protocol. Blood return was not noted. Patient Vitals for the past 12 hrs:   Temp Pulse Resp BP SpO2   06/28/17 1451 98.3 °F (36.8 °C) 65 16 119/74 -   06/28/17 1354 98 °F (36.7 °C) 61 16 129/65 99 %       Medications received:  Ceftriaxone IV  Daptomycin IV    Tolerated treatment well, no adverse reaction noted. Today was last day of antibiotics therefore, port de-accessed and flushed per protocol. D/C'd from [facility] ambulatory and in no distress accompanied by daughter. Pt does not have to return for another appointment.

## 2017-06-28 NOTE — PROGRESS NOTES
Problem: Knowledge Deficit  Goal: *Verbalizes understanding of procedures and medications  Outcome: Progressing Towards Goal  Pt here for Ceftriaxone and Daptomycin

## 2017-09-14 ENCOUNTER — HOSPITAL ENCOUNTER (OUTPATIENT)
Dept: GENERAL RADIOLOGY | Age: 82
Discharge: HOME | End: 2017-09-14
Attending: INTERNAL MEDICINE
Payer: MEDICARE

## 2017-09-14 DIAGNOSIS — A04.72 C. DIFFICILE COLITIS: ICD-10-CM

## 2017-09-14 PROCEDURE — 44500 INTRO GASTROINTESTINAL TUBE: CPT

## 2017-09-14 PROCEDURE — 74011636320 HC RX REV CODE- 636/320: Performed by: RADIOLOGY

## 2017-09-14 RX ORDER — LIDOCAINE HYDROCHLORIDE 20 MG/ML
JELLY TOPICAL AS NEEDED
Status: DISCONTINUED | OUTPATIENT
Start: 2017-09-14 | End: 2017-09-18 | Stop reason: HOSPADM

## 2017-09-14 RX ADMIN — DIATRIZOATE MEGLUMINE AND DIATRIZOATE SODIUM 30 ML: 600; 100 SOLUTION ORAL; RECTAL at 14:00

## 2017-10-13 ENCOUNTER — HOSPITAL ENCOUNTER (OUTPATIENT)
Dept: GENERAL RADIOLOGY | Age: 82
Discharge: HOME | End: 2017-10-13
Attending: INTERNAL MEDICINE
Payer: MEDICARE

## 2017-10-13 DIAGNOSIS — A04.72 C. DIFFICILE COLITIS: ICD-10-CM

## 2017-10-13 PROCEDURE — C1769 GUIDE WIRE: HCPCS

## 2017-10-13 PROCEDURE — 44500 INTRO GASTROINTESTINAL TUBE: CPT

## 2017-10-13 PROCEDURE — 74011636320 HC RX REV CODE- 636/320: Performed by: RADIOLOGY

## 2017-10-13 RX ORDER — LIDOCAINE HYDROCHLORIDE 20 MG/ML
JELLY TOPICAL AS NEEDED
Status: DISCONTINUED | OUTPATIENT
Start: 2017-10-13 | End: 2017-10-17 | Stop reason: HOSPADM

## 2017-10-13 RX ADMIN — DIATRIZOATE MEGLUMINE AND DIATRIZOATE SODIUM 30 ML: 600; 100 SOLUTION ORAL; RECTAL at 14:00

## 2019-01-04 ENCOUNTER — HOSPITAL ENCOUNTER (OUTPATIENT)
Dept: PREADMISSION TESTING | Age: 84
Discharge: HOME | End: 2019-01-04
Attending: INTERNAL MEDICINE
Payer: MEDICARE

## 2019-01-04 VITALS
HEART RATE: 60 BPM | OXYGEN SATURATION: 100 % | TEMPERATURE: 98 F | BODY MASS INDEX: 21.24 KG/M2 | SYSTOLIC BLOOD PRESSURE: 116 MMHG | WEIGHT: 148.37 LBS | RESPIRATION RATE: 16 BRPM | HEIGHT: 70 IN | DIASTOLIC BLOOD PRESSURE: 58 MMHG

## 2019-01-04 LAB
ANION GAP SERPL CALC-SCNC: 5 MMOL/L (ref 5–15)
ATRIAL RATE: 55 BPM
BUN SERPL-MCNC: 46 MG/DL (ref 6–20)
BUN/CREAT SERPL: 13 (ref 12–20)
CALCIUM SERPL-MCNC: 9.6 MG/DL (ref 8.5–10.1)
CALCULATED R AXIS, ECG10: -52 DEGREES
CALCULATED T AXIS, ECG11: 96 DEGREES
CHLORIDE SERPL-SCNC: 110 MMOL/L (ref 97–108)
CO2 SERPL-SCNC: 25 MMOL/L (ref 21–32)
CREAT SERPL-MCNC: 3.53 MG/DL (ref 0.7–1.3)
DIAGNOSIS, 93000: NORMAL
ERYTHROCYTE [DISTWIDTH] IN BLOOD BY AUTOMATED COUNT: 15 % (ref 11.5–14.5)
GLUCOSE SERPL-MCNC: 77 MG/DL (ref 65–100)
HCT VFR BLD AUTO: 31.1 % (ref 36.6–50.3)
HGB BLD-MCNC: 10.1 G/DL (ref 12.1–17)
MCH RBC QN AUTO: 30 PG (ref 26–34)
MCHC RBC AUTO-ENTMCNC: 32.5 G/DL (ref 30–36.5)
MCV RBC AUTO: 92.3 FL (ref 80–99)
NRBC # BLD: 0 K/UL (ref 0–0.01)
NRBC BLD-RTO: 0 PER 100 WBC
P-R INTERVAL, ECG05: 464 MS
PLATELET # BLD AUTO: 56 K/UL (ref 150–400)
PMV BLD AUTO: 11.4 FL (ref 8.9–12.9)
POTASSIUM SERPL-SCNC: 4.3 MMOL/L (ref 3.5–5.1)
Q-T INTERVAL, ECG07: 426 MS
QRS DURATION, ECG06: 150 MS
QTC CALCULATION (BEZET), ECG08: 407 MS
RBC # BLD AUTO: 3.37 M/UL (ref 4.1–5.7)
SODIUM SERPL-SCNC: 140 MMOL/L (ref 136–145)
VENTRICULAR RATE, ECG03: 55 BPM
WBC # BLD AUTO: 13.4 K/UL (ref 4.1–11.1)

## 2019-01-04 PROCEDURE — 93005 ELECTROCARDIOGRAM TRACING: CPT

## 2019-01-04 PROCEDURE — 80048 BASIC METABOLIC PNL TOTAL CA: CPT

## 2019-01-04 PROCEDURE — 36415 COLL VENOUS BLD VENIPUNCTURE: CPT

## 2019-01-04 PROCEDURE — 85027 COMPLETE CBC AUTOMATED: CPT

## 2019-01-04 RX ORDER — SODIUM BICARBONATE 650 MG/1
650 TABLET ORAL 4 TIMES DAILY
COMMUNITY
End: 2019-11-01 | Stop reason: SDUPTHER

## 2019-01-04 RX ORDER — LEVOTHYROXINE SODIUM 112 UG/1
112 TABLET ORAL
COMMUNITY

## 2019-01-04 NOTE — PERIOP NOTES
Dr. Kristine Pope reviewed 1/27/12 and 1/4/19. Patient needs cardiac clearance prior to RFA procedure. Will notify the office.

## 2019-01-07 NOTE — PERIOP NOTES
Left message with Elizabeth Marshall at [facility] that patient will need cardiac clearance prior to procedure. Christopher Sadler with Dr. Nancy Anthony to call back with appointment information. [facility] with Christopher Sadler with Dr. Nancy Anthony. Faxed EKG and pre-op labs. Fax confirmed. Silva to call back with information regarding cardiac clearance appointment. 1120 Faxed pre-op lab results to Dr. Rosi Knox, nephrologist for review. 310 [facility] with Dr. Nancy Anthony called back. Cardiac clearance appointment with Dr. Amando Molina on 1/9/19.

## 2019-01-15 ENCOUNTER — HOSPITAL ENCOUNTER (OUTPATIENT)
Dept: CT IMAGING | Age: 84
Discharge: HOME | End: 2019-01-15
Attending: UROLOGY
Payer: MEDICARE

## 2019-01-15 ENCOUNTER — ANESTHESIA (OUTPATIENT)
Dept: CT IMAGING | Age: 84
End: 2019-01-15
Payer: MEDICARE

## 2019-01-15 ENCOUNTER — ANESTHESIA EVENT (OUTPATIENT)
Dept: CT IMAGING | Age: 84
End: 2019-01-15
Payer: MEDICARE

## 2019-01-15 VITALS
SYSTOLIC BLOOD PRESSURE: 99 MMHG | HEIGHT: 70 IN | RESPIRATION RATE: 15 BRPM | HEART RATE: 80 BPM | BODY MASS INDEX: 20.9 KG/M2 | WEIGHT: 146 LBS | TEMPERATURE: 97.6 F | DIASTOLIC BLOOD PRESSURE: 51 MMHG | OXYGEN SATURATION: 100 %

## 2019-01-15 DIAGNOSIS — N28.9 RENAL LESION: ICD-10-CM

## 2019-01-15 PROCEDURE — 77030020268 CT PERC RENAL CRYOABLATION

## 2019-01-15 PROCEDURE — 77030026438 HC STYL ET INTUB CARD -A: Performed by: ANESTHESIOLOGY

## 2019-01-15 PROCEDURE — 77030008684 HC TU ET CUF COVD -B: Performed by: ANESTHESIOLOGY

## 2019-01-15 PROCEDURE — 77030003481 HC NDL BIOP GUN BARD -B

## 2019-01-15 PROCEDURE — 76060000034 HC ANESTHESIA 1.5 TO 2 HR

## 2019-01-15 PROCEDURE — 76210000006 HC OR PH I REC 0.5 TO 1 HR

## 2019-01-15 PROCEDURE — C2618 PROBE/NEEDLE, CRYO: HCPCS

## 2019-01-15 PROCEDURE — 77030018781

## 2019-01-15 PROCEDURE — 77030014115

## 2019-01-15 PROCEDURE — 74011000250 HC RX REV CODE- 250

## 2019-01-15 PROCEDURE — 77030019908 HC STETH ESOPH SIMS -A: Performed by: ANESTHESIOLOGY

## 2019-01-15 PROCEDURE — 74011250636 HC RX REV CODE- 250/636

## 2019-01-15 PROCEDURE — 74011250636 HC RX REV CODE- 250/636: Performed by: RADIOLOGY

## 2019-01-15 PROCEDURE — 77030003497 HC NDL BIOP TISS BARD -B

## 2019-01-15 RX ORDER — LIDOCAINE HYDROCHLORIDE 20 MG/ML
INJECTION, SOLUTION EPIDURAL; INFILTRATION; INTRACAUDAL; PERINEURAL AS NEEDED
Status: DISCONTINUED | OUTPATIENT
Start: 2019-01-15 | End: 2019-01-15 | Stop reason: HOSPADM

## 2019-01-15 RX ORDER — ONDANSETRON 2 MG/ML
INJECTION INTRAMUSCULAR; INTRAVENOUS AS NEEDED
Status: DISCONTINUED | OUTPATIENT
Start: 2019-01-15 | End: 2019-01-15 | Stop reason: HOSPADM

## 2019-01-15 RX ORDER — SODIUM CHLORIDE 9 MG/ML
INJECTION, SOLUTION INTRAVENOUS
Status: DISCONTINUED | OUTPATIENT
Start: 2019-01-15 | End: 2019-01-15 | Stop reason: HOSPADM

## 2019-01-15 RX ORDER — ROCURONIUM BROMIDE 10 MG/ML
INJECTION, SOLUTION INTRAVENOUS AS NEEDED
Status: DISCONTINUED | OUTPATIENT
Start: 2019-01-15 | End: 2019-01-15 | Stop reason: HOSPADM

## 2019-01-15 RX ORDER — PHENYLEPHRINE HCL IN 0.9% NACL 0.4MG/10ML
SYRINGE (ML) INTRAVENOUS AS NEEDED
Status: DISCONTINUED | OUTPATIENT
Start: 2019-01-15 | End: 2019-01-15 | Stop reason: HOSPADM

## 2019-01-15 RX ORDER — ACETAMINOPHEN 10 MG/ML
INJECTION, SOLUTION INTRAVENOUS AS NEEDED
Status: DISCONTINUED | OUTPATIENT
Start: 2019-01-15 | End: 2019-01-15 | Stop reason: HOSPADM

## 2019-01-15 RX ORDER — SUCCINYLCHOLINE CHLORIDE 20 MG/ML
INJECTION INTRAMUSCULAR; INTRAVENOUS AS NEEDED
Status: DISCONTINUED | OUTPATIENT
Start: 2019-01-15 | End: 2019-01-15 | Stop reason: HOSPADM

## 2019-01-15 RX ORDER — MIDAZOLAM HYDROCHLORIDE 1 MG/ML
0.5 INJECTION, SOLUTION INTRAMUSCULAR; INTRAVENOUS
Status: CANCELLED | OUTPATIENT
Start: 2019-01-15

## 2019-01-15 RX ORDER — EPHEDRINE SULFATE 50 MG/ML
INJECTION, SOLUTION INTRAVENOUS AS NEEDED
Status: DISCONTINUED | OUTPATIENT
Start: 2019-01-15 | End: 2019-01-15 | Stop reason: HOSPADM

## 2019-01-15 RX ORDER — SODIUM CHLORIDE 9 MG/ML
INJECTION, SOLUTION INTRAVENOUS
Status: DISCONTINUED | OUTPATIENT
Start: 2019-01-15 | End: 2019-01-15

## 2019-01-15 RX ORDER — FENTANYL CITRATE 50 UG/ML
50 INJECTION, SOLUTION INTRAMUSCULAR; INTRAVENOUS AS NEEDED
Status: CANCELLED | OUTPATIENT
Start: 2019-01-15

## 2019-01-15 RX ORDER — HYDROMORPHONE HYDROCHLORIDE 1 MG/ML
0.5 INJECTION, SOLUTION INTRAMUSCULAR; INTRAVENOUS; SUBCUTANEOUS
Status: CANCELLED | OUTPATIENT
Start: 2019-01-15

## 2019-01-15 RX ORDER — PROPOFOL 10 MG/ML
INJECTION, EMULSION INTRAVENOUS AS NEEDED
Status: DISCONTINUED | OUTPATIENT
Start: 2019-01-15 | End: 2019-01-15 | Stop reason: HOSPADM

## 2019-01-15 RX ORDER — CEFAZOLIN SODIUM/WATER 2 G/20 ML
SYRINGE (ML) INTRAVENOUS
Status: COMPLETED
Start: 2019-01-15 | End: 2019-01-15

## 2019-01-15 RX ORDER — FENTANYL CITRATE 50 UG/ML
25 INJECTION, SOLUTION INTRAMUSCULAR; INTRAVENOUS
Status: CANCELLED | OUTPATIENT
Start: 2019-01-15

## 2019-01-15 RX ORDER — LIDOCAINE HYDROCHLORIDE 10 MG/ML
0.1 INJECTION, SOLUTION EPIDURAL; INFILTRATION; INTRACAUDAL; PERINEURAL AS NEEDED
Status: CANCELLED | OUTPATIENT
Start: 2019-01-15

## 2019-01-15 RX ORDER — CEFAZOLIN SODIUM/WATER 2 G/20 ML
2 SYRINGE (ML) INTRAVENOUS ONCE
Status: COMPLETED | OUTPATIENT
Start: 2019-01-15 | End: 2019-01-15

## 2019-01-15 RX ORDER — DIPHENHYDRAMINE HYDROCHLORIDE 50 MG/ML
12.5 INJECTION, SOLUTION INTRAMUSCULAR; INTRAVENOUS AS NEEDED
Status: CANCELLED | OUTPATIENT
Start: 2019-01-15 | End: 2019-01-15

## 2019-01-15 RX ORDER — HYDROCODONE BITARTRATE AND ACETAMINOPHEN 5; 325 MG/1; MG/1
1 TABLET ORAL AS NEEDED
Status: CANCELLED | OUTPATIENT
Start: 2019-01-15

## 2019-01-15 RX ORDER — ONDANSETRON 2 MG/ML
4 INJECTION INTRAMUSCULAR; INTRAVENOUS AS NEEDED
Status: CANCELLED | OUTPATIENT
Start: 2019-01-15

## 2019-01-15 RX ORDER — GLYCOPYRROLATE 0.2 MG/ML
INJECTION INTRAMUSCULAR; INTRAVENOUS AS NEEDED
Status: DISCONTINUED | OUTPATIENT
Start: 2019-01-15 | End: 2019-01-15 | Stop reason: HOSPADM

## 2019-01-15 RX ORDER — MIDAZOLAM HYDROCHLORIDE 1 MG/ML
1 INJECTION, SOLUTION INTRAMUSCULAR; INTRAVENOUS AS NEEDED
Status: CANCELLED | OUTPATIENT
Start: 2019-01-15

## 2019-01-15 RX ORDER — LIDOCAINE HYDROCHLORIDE 10 MG/ML
5 INJECTION, SOLUTION EPIDURAL; INFILTRATION; INTRACAUDAL; PERINEURAL
Status: DISPENSED | OUTPATIENT
Start: 2019-01-15 | End: 2019-01-15

## 2019-01-15 RX ORDER — FENTANYL CITRATE 50 UG/ML
INJECTION, SOLUTION INTRAMUSCULAR; INTRAVENOUS AS NEEDED
Status: DISCONTINUED | OUTPATIENT
Start: 2019-01-15 | End: 2019-01-15 | Stop reason: HOSPADM

## 2019-01-15 RX ADMIN — EPHEDRINE SULFATE 10 MG: 50 INJECTION, SOLUTION INTRAVENOUS at 10:28

## 2019-01-15 RX ADMIN — ONDANSETRON 4 MG: 2 INJECTION INTRAMUSCULAR; INTRAVENOUS at 10:10

## 2019-01-15 RX ADMIN — ROCURONIUM BROMIDE 10 MG: 10 INJECTION, SOLUTION INTRAVENOUS at 09:30

## 2019-01-15 RX ADMIN — SODIUM CHLORIDE: 9 INJECTION, SOLUTION INTRAVENOUS at 08:04

## 2019-01-15 RX ADMIN — Medication 80 MCG: at 09:36

## 2019-01-15 RX ADMIN — FENTANYL CITRATE 50 MCG: 50 INJECTION, SOLUTION INTRAMUSCULAR; INTRAVENOUS at 09:30

## 2019-01-15 RX ADMIN — ACETAMINOPHEN 1000 MG: 10 INJECTION, SOLUTION INTRAVENOUS at 10:00

## 2019-01-15 RX ADMIN — GLYCOPYRROLATE 0.2 MG: 0.2 INJECTION INTRAMUSCULAR; INTRAVENOUS at 10:06

## 2019-01-15 RX ADMIN — Medication 80 MCG: at 09:40

## 2019-01-15 RX ADMIN — Medication 80 MCG: at 09:55

## 2019-01-15 RX ADMIN — Medication 2 G: at 09:45

## 2019-01-15 RX ADMIN — FENTANYL CITRATE 50 MCG: 50 INJECTION, SOLUTION INTRAMUSCULAR; INTRAVENOUS at 09:50

## 2019-01-15 RX ADMIN — Medication 40 MCG: at 10:54

## 2019-01-15 RX ADMIN — EPHEDRINE SULFATE 10 MG: 50 INJECTION, SOLUTION INTRAVENOUS at 10:10

## 2019-01-15 RX ADMIN — SUCCINYLCHOLINE CHLORIDE 140 MG: 20 INJECTION INTRAMUSCULAR; INTRAVENOUS at 09:30

## 2019-01-15 RX ADMIN — Medication 40 MCG: at 10:55

## 2019-01-15 RX ADMIN — LIDOCAINE HYDROCHLORIDE 60 MG: 20 INJECTION, SOLUTION EPIDURAL; INFILTRATION; INTRACAUDAL; PERINEURAL at 09:30

## 2019-01-15 RX ADMIN — Medication 80 MCG: at 10:44

## 2019-01-15 RX ADMIN — PROPOFOL 150 MG: 10 INJECTION, EMULSION INTRAVENOUS at 09:30

## 2019-01-15 NOTE — PERIOP NOTES
Handoff Report from Operating Room to PACU    Report received from Quincy Kinney RN and Alonzo Arredondo CRNA regarding Cammie Michel. * No surgeons listed *  And * No procedures listed *  confirmed   with allergies and dressings discussed. Anesthesia type, drugs, patient history, complications, estimated blood loss, vital signs, intake and output, and last pain medication, lines and temperature were reviewed.

## 2019-01-15 NOTE — PERIOP NOTES
TRANSFER - OUT REPORT:    Verbal report given to Lisa Vick RN(name) on Briana Rose  being transferred to IR recovery(unit) for routine progression of care       Report consisted of patients Situation, Background, Assessment and   Recommendations(SBAR). Information from the following report(s) SBAR, Procedure Summary, Intake/Output and Cardiac Rhythm Sinus arrythmia with stable rate was reviewed with the receiving nurse. Opportunity for questions and clarification was provided.       Patient transported with:   [address]

## 2019-01-15 NOTE — PROGRESS NOTES
Dr. Scar Reyes at bedside for pre procedure consult and consent. Questions reviewed with understanding. Consent obtained and witnessed. MD aware of all patients most recent lab values.

## 2019-01-15 NOTE — ANESTHESIA PREPROCEDURE EVALUATION
Anesthetic History No history of anesthetic complications Review of Systems / Medical History Patient summary reviewed, nursing notes reviewed and pertinent labs reviewed Pulmonary Within defined limits Neuro/Psych Within defined limits Cardiovascular Within defined limits Hyperlipidemia Exercise tolerance: >4 METS Comments: EKG 1/19: Normal sinus rhythm  
with 2nd degree AV block (Mobitz I) Left axis deviation Right bundle branch block Cannot rule out Septal infarct GI/Hepatic/Renal 
Within defined limits Renal disease: CRI Endo/Other Within defined limits Hypothyroidism Other Findings Comments: Hx [facility] clearance obtained. Physical Exam 
 
Airway Mallampati: II 
TM Distance: 4 - 6 cm Neck ROM: normal range of motion Mouth opening: Normal 
 
 Cardiovascular Regular rate and rhythm,  S1 and S2 normal,  no murmur, click, rub, or gallop Dental 
No notable dental hx Pulmonary Breath sounds clear to auscultation Abdominal 
GI exam deferred Other Findings Anesthetic Plan ASA: 3 Anesthesia type: general 
 
 
 
 
Induction: Intravenous Anesthetic plan and risks discussed with: Patient and Spouse

## 2019-01-15 NOTE — PROGRESS NOTES
Pt amb. To xray recovery area. Gait steady. Wife at bedside. Aki GARCÍA at bedside for pre procedure consult and consent.

## 2019-01-15 NOTE — PROGRESS NOTES
Pt to CT w/ RN. Patient care transferred to CRNA/Anesthesia. See anesthesia event record for all VS, airway management and medication administrations. PACU RNLashae,  notified of recovery need post procedure.

## 2019-01-15 NOTE — DISCHARGE INSTRUCTIONS
[facility]  Department of Interventional Radiology         [facility] Discharge Instructions      Home Care Instructions: You can resume your regular diet and medication regimen. Do not drink alcohol, drive, or make any important legal decisions in the next 24 hours. Do not lift anything heavier than a gallon of milk, or do anything strenuous for the next 24 hours. You will notice a dressing at the insertion sites for the procedure. Keep the sites covered until the puncture sites have healed. You make take a shower after 24 hours but do not soak or swim until the wound has totally healed. After your puncture wound heals, there is no special care that is needed. You may resume your normal level of activity slowly, after about one week of light activity or per your Physicians instructions. Follow-Up Instructions: Please see your ordering doctor as he/she has requested.      To Reach Us: [phone]    Patient Signature:  Date: 1/15/2019  Discharging Nurse: Mariela Tapia RN

## 2019-01-15 NOTE — H&P
Interventional and Vascular Radiology History and Physical    Patient: Basilio Campbell 81 y.o. male       Chief Complaint: No chief complaint on file. History of Present Illness: right renal mass     History:    Past Medical History:   Diagnosis Date    [facility]    MELANOMA,   PROSTATE, anal, thyroid    Chronic kidney disease     renal failure and renal cyst    CKD (chronic kidney disease) stage 3, GFR 30-59 ml/min ([facility] 6/5/2017    Hyperlipidemia     Ill-defined condition     CMML    Thyroid disease      Family History   Problem Relation Age of Onset    Alcohol abuse Mother     Hypertension Mother     Heart Disease Mother     No Known Problems Father     Cancer Sister         breast    Cancer Brother         COLON    No Known Problems Sister      Social History     Socioeconomic History    Marital status:      Spouse name: Not on file    Number of children: Not on file    Years of education: Not on file    Highest education level: Not on file   Social Needs    Financial resource strain: Not on file    Food insecurity - worry: Not on file    Food insecurity - inability: Not on file   8hands needs - medical: Not on file   8hands needs - non-medical: Not on file   Occupational History    Not on file   Tobacco Use    Smoking status: Never Smoker    Smokeless tobacco: Never Used   Substance and Sexual Activity    Alcohol use: Yes     Comment: OCC    Drug use: No    Sexual activity: Not on file   Other Topics Concern    Not on file   Social History Narrative    Not on file       Allergies: No Known Allergies    Current Medications:  Current Outpatient Medications   Medication Sig    levothyroxine (SYNTHROID) 112 mcg tablet Take  by mouth Daily (before breakfast).  sodium bicarbonate 650 mg tablet Take 650 mg by mouth three (3) times daily.  B infantis/B ani/B jerry/B bifid (PROBIOTIC 4X PO) Take  by mouth daily.     vitamin B complex (VITAMIN B-50 PO) Take  by mouth daily.  epoetin jaquelin (PROCRIT INJECTION) by Injection route. Every 3 weeks    atorvastatin (LIPITOR) 10 mg tablet Take 10 mg by mouth daily.  Cholecalciferol, Vitamin D3, (VITAMIN D) 1,000 unit Cap Take  by mouth daily. Current Facility-Administered Medications   Medication Dose Route Frequency    lidocaine (PF) (XYLOCAINE) 10 mg/mL (1 %) injection 5 mL  5 mL SubCUTAneous RAD ONCE     Facility-Administered Medications Ordered in Other Encounters   Medication Dose Route Frequency    lidocaine (PF) (XYLOCAINE) 20 mg/mL (2 %) injection   IntraVENous PRN    fentaNYL citrate (PF) injection   IntraVENous PRN    rocuronium (ZEMURON) injection   IntraVENous PRN    propofol (DIPRIVAN) 10 mg/mL injection   IntraVENous PRN    succinylcholine (ANECTINE) injection   IntraVENous PRN    PHENYLephrine (NEOSYNEPHRINE) in NS syringe   IntraVENous PRN    glycopyrrolate (ROBINUL) injection   IntraVENous PRN    acetaminophen (OFIRMEV) infusion    PRN    ePHEDrine (MISTOLE) 50 mg/mL injection    PRN    ondansetron (ZOFRAN) injection   IntraVENous PRN    0.9% sodium chloride infusion   IntraVENous CONTINUOUS        Physical Exam:  Blood pressure 113/54, pulse 65, temperature 97.6 °F (36.4 °C), resp. rate 18, height 5' 10\" (1.778 m), weight 66.2 kg (146 lb), SpO2 100 %. LUNG: clear to auscultation bilaterally, HEART: regular rate and rhythm, S1, S2 normal, no murmur, click, rub or gallop      Alerts:    Hospital Problems  Date Reviewed: 5/31/2017    None          Laboratory:    No results for input(s): HGB, HCT, WBC, PLT, INR, BUN, CREA, K, CRCLT, HGBEXT, HCTEXT, PLTEXT in the last 72 hours. No lab exists for component: PTT, PT, INREXT      Plan of Care/Planned Procedure:  Risks, benefits, and alternatives reviewed with patient and he agrees to proceed with the procedure. Conscious sedation will be performed with IV fentanyl and versed.  Plan is for percutaneous right renal Benji Braswell MD

## 2019-01-15 NOTE — PROGRESS NOTES
Re eval by Nadia Coreas. Pt cleared for discharge. Pt OOB to dress without difficulty , wife at bedside. Pt encouraged to void. Pt verbalized he does not have to go and wants to try at home. MD aware. Discharge instructions have been reviewed with patient and present family. Copy given to patient. Pt verbalized understand of instructions and was given  the opportunity to ask questions and receive answers prior to leaving. Pt discharged with family and assisted out by RN in wheelchair.

## 2019-01-15 NOTE — ROUTINE PROCESS
Patient c/o dizziness with attempt to stand. Patient returned to stretcher. Patient reports no urge to urinate. Discussed with Dr. Azul Reynaga dizziness and unable to urinate. Dr. Azul Reynaga ordered IV NS bolus 500 ml.

## 2019-01-15 NOTE — ANESTHESIA POSTPROCEDURE EVALUATION
* No procedures listed *. Anesthesia Post Evaluation Patient location during evaluation: PACU Note status: Adequate. Level of consciousness: responsive to verbal stimuli and sleepy but conscious Pain management: satisfactory to patient Airway patency: patent Anesthetic complications: no 
Cardiovascular status: acceptable Respiratory status: acceptable Hydration status: acceptable Comments: +Post-Anesthesia Evaluation and Assessment Patient: Tamika Terrazas MRN: [id number]  SSN: [id number] YOB: 1935  Age: 81 y.o. Sex: male Cardiovascular Function/Vital Signs /49 (BP 1 Location: Left arm, BP Patient Position: At rest;Head of bed elevated (Comment degrees))   Pulse 66   Temp 36.4 °C (97.5 °F)   Resp 13   Ht 5' 10\" (1.778 m)   Wt 66.2 kg (146 lb)   SpO2 100%   BMI 20.95 kg/m² Patient is status post * No procedures listed *. Nausea/Vomiting: Controlled. Postoperative hydration reviewed and adequate. Pain: 
Pain Scale 1: Numeric (0 - 10) (01/15/19 1200) Pain Intensity 1: 0 (01/15/19 1200) Managed. Neurological Status:  
Neuro (WDL): Exceptions to WDL (01/15/19 1120) At baseline. Mental Status and Level of Consciousness: Arousable. Pulmonary Status:  
O2 Device: Room air (01/15/19 1200) Adequate oxygenation and airway patent. Complications related to anesthesia: None Post-anesthesia assessment completed. No concerns. Signed By: Brianna Saravia MD  
 1/15/2019 Post anesthesia nausea and vomiting:  controlled Visit Vitals /49 (BP 1 Location: Left arm, BP Patient Position: At rest;Head of bed elevated (Comment degrees)) Pulse 66 Temp 36.4 °C (97.5 °F) Resp 13 Ht 5' 10\" (1.778 m) Wt 66.2 kg (146 lb) SpO2 100% BMI 20.95 kg/m²

## 2019-09-24 ENCOUNTER — HOSPITAL ENCOUNTER (OUTPATIENT)
Dept: MRI IMAGING | Age: 84
Discharge: HOME | End: 2019-09-24
Attending: PODIATRIST
Payer: MEDICARE

## 2019-09-24 DIAGNOSIS — M86.9 OSTEOMYELITIS (HCC): ICD-10-CM

## 2019-09-24 PROCEDURE — 73718 MRI LOWER EXTREMITY W/O DYE: CPT

## 2019-09-30 ENCOUNTER — HOSPITAL ENCOUNTER (OUTPATIENT)
Dept: PREADMISSION TESTING | Age: 84
Discharge: HOME | End: 2019-09-30
Payer: MEDICARE

## 2019-09-30 ENCOUNTER — HOSPITAL ENCOUNTER (OUTPATIENT)
Dept: GENERAL RADIOLOGY | Age: 84
Discharge: HOME | End: 2019-09-30
Payer: MEDICARE

## 2019-09-30 VITALS
TEMPERATURE: 97.9 F | DIASTOLIC BLOOD PRESSURE: 70 MMHG | WEIGHT: 136.13 LBS | HEIGHT: 70 IN | RESPIRATION RATE: 14 BRPM | BODY MASS INDEX: 19.49 KG/M2 | SYSTOLIC BLOOD PRESSURE: 117 MMHG | HEART RATE: 67 BPM

## 2019-09-30 LAB
ALBUMIN SERPL-MCNC: 3.6 G/DL (ref 3.5–5)
ALBUMIN/GLOB SERPL: 0.8 {RATIO} (ref 1.1–2.2)
ALP SERPL-CCNC: 81 U/L (ref 45–117)
ALT SERPL-CCNC: 15 U/L (ref 12–78)
ANION GAP SERPL CALC-SCNC: 9 MMOL/L (ref 5–15)
APTT PPP: 39.5 SEC (ref 22.1–32)
AST SERPL-CCNC: 24 U/L (ref 15–37)
ATRIAL RATE: 69 BPM
BASOPHILS # BLD: 0 K/UL (ref 0–0.1)
BASOPHILS NFR BLD: 0 % (ref 0–1)
BILIRUB SERPL-MCNC: 0.6 MG/DL (ref 0.2–1)
BUN SERPL-MCNC: 42 MG/DL (ref 6–20)
BUN/CREAT SERPL: 8 (ref 12–20)
CALCIUM SERPL-MCNC: 9.6 MG/DL (ref 8.5–10.1)
CALCULATED P AXIS, ECG09: 38 DEGREES
CALCULATED R AXIS, ECG10: -49 DEGREES
CALCULATED T AXIS, ECG11: -59 DEGREES
CHLORIDE SERPL-SCNC: 111 MMOL/L (ref 97–108)
CO2 SERPL-SCNC: 22 MMOL/L (ref 21–32)
CREAT SERPL-MCNC: 5.26 MG/DL (ref 0.7–1.3)
DIAGNOSIS, 93000: NORMAL
DIFFERENTIAL METHOD BLD: ABNORMAL
EOSINOPHIL # BLD: 0 K/UL (ref 0–0.4)
EOSINOPHIL NFR BLD: 0 % (ref 0–7)
ERYTHROCYTE [DISTWIDTH] IN BLOOD BY AUTOMATED COUNT: 16.2 % (ref 11.5–14.5)
GLOBULIN SER CALC-MCNC: 4.6 G/DL (ref 2–4)
GLUCOSE SERPL-MCNC: 103 MG/DL (ref 65–100)
HCT VFR BLD AUTO: 34.9 % (ref 36.6–50.3)
HGB BLD-MCNC: 10.8 G/DL (ref 12.1–17)
IMM GRANULOCYTES # BLD AUTO: 0 K/UL
IMM GRANULOCYTES NFR BLD AUTO: 0 %
INR PPP: 1.2 (ref 0.9–1.1)
LYMPHOCYTES # BLD: 1.5 K/UL (ref 0.8–3.5)
LYMPHOCYTES NFR BLD: 11 % (ref 12–49)
MCH RBC QN AUTO: 31.1 PG (ref 26–34)
MCHC RBC AUTO-ENTMCNC: 30.9 G/DL (ref 30–36.5)
MCV RBC AUTO: 100.6 FL (ref 80–99)
METAMYELOCYTES NFR BLD MANUAL: 2 %
MONOCYTES # BLD: 6 K/UL (ref 0–1)
MONOCYTES NFR BLD: 45 % (ref 5–13)
NEUTS SEG # BLD: 5.6 K/UL (ref 1.8–8)
NEUTS SEG NFR BLD: 42 % (ref 32–75)
NRBC # BLD: 0 K/UL (ref 0–0.01)
NRBC BLD-RTO: 0 PER 100 WBC
PLATELET # BLD AUTO: 52 K/UL (ref 150–400)
PMV BLD AUTO: 12.2 FL (ref 8.9–12.9)
POTASSIUM SERPL-SCNC: 5.2 MMOL/L (ref 3.5–5.1)
PROT SERPL-MCNC: 8.2 G/DL (ref 6.4–8.2)
PROTHROMBIN TIME: 12.4 SEC (ref 9–11.1)
Q-T INTERVAL, ECG07: 456 MS
QRS DURATION, ECG06: 144 MS
QTC CALCULATION (BEZET), ECG08: 389 MS
RBC # BLD AUTO: 3.47 M/UL (ref 4.1–5.7)
RBC MORPH BLD: ABNORMAL
SODIUM SERPL-SCNC: 142 MMOL/L (ref 136–145)
THERAPEUTIC RANGE,PTTT: ABNORMAL SECS (ref 58–77)
VENTRICULAR RATE, ECG03: 44 BPM
WBC # BLD AUTO: 13.3 K/UL (ref 4.1–11.1)

## 2019-09-30 PROCEDURE — 85025 COMPLETE CBC W/AUTO DIFF WBC: CPT

## 2019-09-30 PROCEDURE — 93005 ELECTROCARDIOGRAM TRACING: CPT

## 2019-09-30 PROCEDURE — 71046 X-RAY EXAM CHEST 2 VIEWS: CPT

## 2019-09-30 PROCEDURE — 80053 COMPREHEN METABOLIC PANEL: CPT

## 2019-09-30 PROCEDURE — 85610 PROTHROMBIN TIME: CPT

## 2019-09-30 PROCEDURE — 85730 THROMBOPLASTIN TIME PARTIAL: CPT

## 2019-09-30 PROCEDURE — 36415 COLL VENOUS BLD VENIPUNCTURE: CPT

## 2019-09-30 RX ORDER — AMOXICILLIN AND CLAVULANATE POTASSIUM 500; 125 MG/1; MG/1
1 TABLET, FILM COATED ORAL 2 TIMES DAILY
COMMUNITY
End: 2019-11-01

## 2019-09-30 NOTE — PERIOP NOTES
Preoperative instructions reviewed with patient. Patient given SSI infection FAQS sheet. Given two bottles of skin prep chlorhexidine soap; given written and verbal instructions on use. Patient was given the opportunity to ask questions on the information provided. Surgical consent obtained and signed by patient. Instructed patient to obtain chest xray at [address] [place] [address] upon leaving PAT department.

## 2019-09-30 NOTE — PERIOP NOTES
Upon obtaining EKG, rhythm was unclear for 2nd degree block or possible Afib. Patient is alert, oriented, vital signs within normal limits, skin warm and dry, denies dizziness or SOB. Annie Dela Cruz NP in [facility] consulted for further evaluation.

## 2019-10-01 NOTE — ADVANCED PRACTICE NURSE
Per patient's wife, Charly Briggs, PCP has referred patient for cardiac eval prior to surgery. Requested OV notes faxed to PAT when complete from this eval.  Oncology notes/ last labs requested from Dr Sheila Moura team.  Melodie Rosario RN (IP) informed of active treatment for C-Diff and chart flagged. 10/2-Cardiac clearance received from Dr Margie Zavala.

## 2019-10-01 NOTE — PERIOP NOTES
DR. Keegan Alvarenga OFFICE FAXED AND VM LEFT FOR NURSE REGARDING ABNORMAL LAB RESULTS ON CBC, CMP, AND PT/PTT. EKG SENT FOR ANESTHESIA REVIEW. EKG OK PER DR. Kerline Padilla.

## 2019-10-07 ENCOUNTER — ANESTHESIA EVENT (OUTPATIENT)
Dept: SURGERY | Age: 84
End: 2019-10-07
Payer: MEDICARE

## 2019-10-07 NOTE — PERIOP NOTES
Spoke with Miladys Saenz and notified her PAT was done 9/30/19 for Dr. Maki Bean surgery sched 11/7/19 so there will be no chart sent for Dr. Zafar Holloway surgery 10/8/19.

## 2019-10-08 ENCOUNTER — HOSPITAL ENCOUNTER (OUTPATIENT)
Age: 84
Setting detail: OUTPATIENT SURGERY
Discharge: HOME | End: 2019-10-08
Attending: PODIATRIST | Admitting: PODIATRIST
Payer: MEDICARE

## 2019-10-08 ENCOUNTER — ANESTHESIA (OUTPATIENT)
Dept: SURGERY | Age: 84
End: 2019-10-08
Payer: MEDICARE

## 2019-10-08 VITALS
HEIGHT: 70 IN | TEMPERATURE: 96.9 F | SYSTOLIC BLOOD PRESSURE: 104 MMHG | RESPIRATION RATE: 15 BRPM | HEART RATE: 65 BPM | OXYGEN SATURATION: 98 % | BODY MASS INDEX: 19.49 KG/M2 | WEIGHT: 136.13 LBS | DIASTOLIC BLOOD PRESSURE: 54 MMHG

## 2019-10-08 DIAGNOSIS — M79.671 RIGHT FOOT PAIN: Primary | ICD-10-CM

## 2019-10-08 DIAGNOSIS — G89.18 POST-OP PAIN: ICD-10-CM

## 2019-10-08 PROCEDURE — 74011250637 HC RX REV CODE- 250/637: Performed by: ANESTHESIOLOGY

## 2019-10-08 PROCEDURE — 74011250636 HC RX REV CODE- 250/636: Performed by: NURSE ANESTHETIST, CERTIFIED REGISTERED

## 2019-10-08 PROCEDURE — 74011000272 HC RX REV CODE- 272: Performed by: PODIATRIST

## 2019-10-08 PROCEDURE — 76060000033 HC ANESTHESIA 1 TO 1.5 HR: Performed by: PODIATRIST

## 2019-10-08 PROCEDURE — 87186 SC STD MICRODIL/AGAR DIL: CPT

## 2019-10-08 PROCEDURE — 88305 TISSUE EXAM BY PATHOLOGIST: CPT

## 2019-10-08 PROCEDURE — 76210000021 HC REC RM PH II 0.5 TO 1 HR: Performed by: PODIATRIST

## 2019-10-08 PROCEDURE — 77030031139 HC SUT VCRL2 J&J -A: Performed by: PODIATRIST

## 2019-10-08 PROCEDURE — 76010000149 HC OR TIME 1 TO 1.5 HR: Performed by: PODIATRIST

## 2019-10-08 PROCEDURE — 76210000006 HC OR PH I REC 0.5 TO 1 HR: Performed by: PODIATRIST

## 2019-10-08 PROCEDURE — 74011250636 HC RX REV CODE- 250/636: Performed by: PODIATRIST

## 2019-10-08 PROCEDURE — 87205 SMEAR GRAM STAIN: CPT

## 2019-10-08 PROCEDURE — 77030018836 HC SOL IRR NACL ICUM -A: Performed by: PODIATRIST

## 2019-10-08 PROCEDURE — 87077 CULTURE AEROBIC IDENTIFY: CPT

## 2019-10-08 PROCEDURE — 87075 CULTR BACTERIA EXCEPT BLOOD: CPT

## 2019-10-08 PROCEDURE — 88307 TISSUE EXAM BY PATHOLOGIST: CPT

## 2019-10-08 PROCEDURE — 74011000250 HC RX REV CODE- 250: Performed by: NURSE ANESTHETIST, CERTIFIED REGISTERED

## 2019-10-08 PROCEDURE — 77030020754 HC CUF TRNQT 2BLA STRY -B: Performed by: PODIATRIST

## 2019-10-08 PROCEDURE — 77030011640 HC PAD GRND REM COVD -A: Performed by: PODIATRIST

## 2019-10-08 PROCEDURE — 77030002916 HC SUT ETHLN J&J -A: Performed by: PODIATRIST

## 2019-10-08 PROCEDURE — 74011000250 HC RX REV CODE- 250: Performed by: PODIATRIST

## 2019-10-08 PROCEDURE — 88311 DECALCIFY TISSUE: CPT

## 2019-10-08 RX ORDER — FENTANYL CITRATE 50 UG/ML
25 INJECTION, SOLUTION INTRAMUSCULAR; INTRAVENOUS
Status: DISCONTINUED | OUTPATIENT
Start: 2019-10-08 | End: 2019-10-08 | Stop reason: HOSPADM

## 2019-10-08 RX ORDER — ACETAMINOPHEN 325 MG/1
650 TABLET ORAL ONCE
Status: COMPLETED | OUTPATIENT
Start: 2019-10-08 | End: 2019-10-08

## 2019-10-08 RX ORDER — ONDANSETRON 2 MG/ML
INJECTION INTRAMUSCULAR; INTRAVENOUS AS NEEDED
Status: DISCONTINUED | OUTPATIENT
Start: 2019-10-08 | End: 2019-10-08 | Stop reason: HOSPADM

## 2019-10-08 RX ORDER — MIDAZOLAM HYDROCHLORIDE 1 MG/ML
1 INJECTION, SOLUTION INTRAMUSCULAR; INTRAVENOUS AS NEEDED
Status: DISCONTINUED | OUTPATIENT
Start: 2019-10-08 | End: 2019-10-08 | Stop reason: HOSPADM

## 2019-10-08 RX ORDER — LIDOCAINE HYDROCHLORIDE 20 MG/ML
INJECTION, SOLUTION EPIDURAL; INFILTRATION; INTRACAUDAL; PERINEURAL AS NEEDED
Status: DISCONTINUED | OUTPATIENT
Start: 2019-10-08 | End: 2019-10-08 | Stop reason: HOSPADM

## 2019-10-08 RX ORDER — ONDANSETRON 2 MG/ML
4 INJECTION INTRAMUSCULAR; INTRAVENOUS AS NEEDED
Status: DISCONTINUED | OUTPATIENT
Start: 2019-10-08 | End: 2019-10-08 | Stop reason: HOSPADM

## 2019-10-08 RX ORDER — LIDOCAINE HYDROCHLORIDE 10 MG/ML
INJECTION INFILTRATION; PERINEURAL AS NEEDED
Status: DISCONTINUED | OUTPATIENT
Start: 2019-10-08 | End: 2019-10-08 | Stop reason: HOSPADM

## 2019-10-08 RX ORDER — MIDAZOLAM HYDROCHLORIDE 1 MG/ML
1 INJECTION, SOLUTION INTRAMUSCULAR; INTRAVENOUS
Status: DISCONTINUED | OUTPATIENT
Start: 2019-10-08 | End: 2019-10-08 | Stop reason: HOSPADM

## 2019-10-08 RX ORDER — PROPOFOL 10 MG/ML
INJECTION, EMULSION INTRAVENOUS
Status: DISCONTINUED | OUTPATIENT
Start: 2019-10-08 | End: 2019-10-08 | Stop reason: HOSPADM

## 2019-10-08 RX ORDER — CEFAZOLIN SODIUM/WATER 2 G/20 ML
2 SYRINGE (ML) INTRAVENOUS ONCE
Status: COMPLETED | OUTPATIENT
Start: 2019-10-08 | End: 2019-10-08

## 2019-10-08 RX ORDER — MORPHINE SULFATE 10 MG/ML
2 INJECTION, SOLUTION INTRAMUSCULAR; INTRAVENOUS
Status: DISCONTINUED | OUTPATIENT
Start: 2019-10-08 | End: 2019-10-08 | Stop reason: HOSPADM

## 2019-10-08 RX ORDER — FENTANYL CITRATE 50 UG/ML
INJECTION, SOLUTION INTRAMUSCULAR; INTRAVENOUS AS NEEDED
Status: DISCONTINUED | OUTPATIENT
Start: 2019-10-08 | End: 2019-10-08 | Stop reason: HOSPADM

## 2019-10-08 RX ORDER — SODIUM CHLORIDE 0.9 % (FLUSH) 0.9 %
5-40 SYRINGE (ML) INJECTION EVERY 8 HOURS
Status: DISCONTINUED | OUTPATIENT
Start: 2019-10-08 | End: 2019-10-08 | Stop reason: HOSPADM

## 2019-10-08 RX ORDER — PHENYLEPHRINE HCL IN 0.9% NACL 0.4MG/10ML
SYRINGE (ML) INTRAVENOUS AS NEEDED
Status: DISCONTINUED | OUTPATIENT
Start: 2019-10-08 | End: 2019-10-08 | Stop reason: HOSPADM

## 2019-10-08 RX ORDER — SODIUM CHLORIDE 0.9 % (FLUSH) 0.9 %
5-40 SYRINGE (ML) INJECTION AS NEEDED
Status: DISCONTINUED | OUTPATIENT
Start: 2019-10-08 | End: 2019-10-08 | Stop reason: HOSPADM

## 2019-10-08 RX ORDER — SODIUM CHLORIDE 9 MG/ML
INJECTION, SOLUTION INTRAVENOUS
Status: DISCONTINUED | OUTPATIENT
Start: 2019-10-08 | End: 2019-10-08 | Stop reason: HOSPADM

## 2019-10-08 RX ORDER — HYDROCODONE BITARTRATE AND ACETAMINOPHEN 5; 325 MG/1; MG/1
1 TABLET ORAL
Qty: 40 TAB | Refills: 0 | Status: SHIPPED | OUTPATIENT
Start: 2019-10-08 | End: 2019-10-15

## 2019-10-08 RX ORDER — BUPIVACAINE HYDROCHLORIDE 5 MG/ML
INJECTION, SOLUTION EPIDURAL; INTRACAUDAL AS NEEDED
Status: DISCONTINUED | OUTPATIENT
Start: 2019-10-08 | End: 2019-10-08 | Stop reason: HOSPADM

## 2019-10-08 RX ORDER — LIDOCAINE HYDROCHLORIDE 10 MG/ML
0.5 INJECTION, SOLUTION EPIDURAL; INFILTRATION; INTRACAUDAL; PERINEURAL AS NEEDED
Status: DISCONTINUED | OUTPATIENT
Start: 2019-10-08 | End: 2019-10-08 | Stop reason: HOSPADM

## 2019-10-08 RX ORDER — DEXTROSE, SODIUM CHLORIDE, SODIUM LACTATE, POTASSIUM CHLORIDE, AND CALCIUM CHLORIDE 5; .6; .31; .03; .02 G/100ML; G/100ML; G/100ML; G/100ML; G/100ML
125 INJECTION, SOLUTION INTRAVENOUS CONTINUOUS
Status: DISCONTINUED | OUTPATIENT
Start: 2019-10-08 | End: 2019-10-08 | Stop reason: HOSPADM

## 2019-10-08 RX ORDER — FENTANYL CITRATE 50 UG/ML
50 INJECTION, SOLUTION INTRAMUSCULAR; INTRAVENOUS AS NEEDED
Status: DISCONTINUED | OUTPATIENT
Start: 2019-10-08 | End: 2019-10-08 | Stop reason: HOSPADM

## 2019-10-08 RX ORDER — SODIUM CHLORIDE, SODIUM LACTATE, POTASSIUM CHLORIDE, CALCIUM CHLORIDE 600; 310; 30; 20 MG/100ML; MG/100ML; MG/100ML; MG/100ML
125 INJECTION, SOLUTION INTRAVENOUS CONTINUOUS
Status: DISCONTINUED | OUTPATIENT
Start: 2019-10-08 | End: 2019-10-08 | Stop reason: HOSPADM

## 2019-10-08 RX ORDER — OXYCODONE HYDROCHLORIDE 5 MG/1
5 TABLET ORAL AS NEEDED
Status: DISCONTINUED | OUTPATIENT
Start: 2019-10-08 | End: 2019-10-08 | Stop reason: HOSPADM

## 2019-10-08 RX ORDER — DIPHENHYDRAMINE HYDROCHLORIDE 50 MG/ML
12.5 INJECTION, SOLUTION INTRAMUSCULAR; INTRAVENOUS AS NEEDED
Status: DISCONTINUED | OUTPATIENT
Start: 2019-10-08 | End: 2019-10-08 | Stop reason: HOSPADM

## 2019-10-08 RX ORDER — PROPOFOL 10 MG/ML
INJECTION, EMULSION INTRAVENOUS AS NEEDED
Status: DISCONTINUED | OUTPATIENT
Start: 2019-10-08 | End: 2019-10-08 | Stop reason: HOSPADM

## 2019-10-08 RX ORDER — AMOXICILLIN AND CLAVULANATE POTASSIUM 875; 125 MG/1; MG/1
1 TABLET, FILM COATED ORAL 2 TIMES DAILY
Qty: 14 TAB | Refills: 0 | Status: SHIPPED | OUTPATIENT
Start: 2019-10-08 | End: 2019-10-15

## 2019-10-08 RX ADMIN — FENTANYL CITRATE 25 MCG: 50 INJECTION, SOLUTION INTRAMUSCULAR; INTRAVENOUS at 15:56

## 2019-10-08 RX ADMIN — PROPOFOL 20 MG: 10 INJECTION, EMULSION INTRAVENOUS at 15:45

## 2019-10-08 RX ADMIN — SODIUM CHLORIDE: 900 INJECTION, SOLUTION INTRAVENOUS at 15:27

## 2019-10-08 RX ADMIN — Medication 80 MCG: at 16:35

## 2019-10-08 RX ADMIN — Medication 40 MCG: at 16:17

## 2019-10-08 RX ADMIN — Medication 40 MCG: at 16:20

## 2019-10-08 RX ADMIN — Medication 1.5 G: at 15:49

## 2019-10-08 RX ADMIN — Medication 40 MCG: at 16:11

## 2019-10-08 RX ADMIN — PROPOFOL 50 MCG/KG/MIN: 10 INJECTION, EMULSION INTRAVENOUS at 15:45

## 2019-10-08 RX ADMIN — LIDOCAINE HYDROCHLORIDE 40 MG: 20 INJECTION, SOLUTION EPIDURAL; INFILTRATION; INTRACAUDAL; PERINEURAL at 15:44

## 2019-10-08 RX ADMIN — ACETAMINOPHEN 650 MG: 325 TABLET ORAL at 14:22

## 2019-10-08 RX ADMIN — ONDANSETRON HYDROCHLORIDE 2 MG: 2 INJECTION, SOLUTION INTRAMUSCULAR; INTRAVENOUS at 15:51

## 2019-10-08 NOTE — DISCHARGE INSTRUCTIONS
[address], PTereC. Eduardo Cardoza DPM  [id number] [place], [place]. [address]  Phone: [phone]  Fax: [phone]    Post-Op Instructions    Proper care during the postoperative period is an integral part of your surgical treatment program.  It is imperative that these instructions are followed to insure proper healing and to obtain the best results. 1.) Go directly home and keep your feet elevated on the way. 2.) At home, elevate your feet 6 inches above hip level by supporting feet & legs with pillows. 3.) Apply an ice bag covered with a towel just above but not on the operative site for 30 minutes out of each hour for the first 48 hours. 4.) Limited swelling is expected. Occasionally, the skin may take on a bruised appearance. There is no cause for alarm. 5.) Keep your bandages/cast clean and dry. Do NOT remove the bandages or inspect the wound. A small amount of blood on the bandage is normal.    6.) Have prescriptions filled and take medication as directed. If medication causes stomach upset, headache, rash, or other abnormal reactions, discontinue use and CALL THE DOCTOR.    7.) Get plenty of rest with the foot elevated. Drink plenty of fluids and eat regular well-balanced meals. 8.) If you have any problems or concerns, you can call the office anytime. There is a doctor on call 24 hours a day.   CALL THE OFFICE IMMEDIATELY if:   -The bandages become overly stained   -Your medications do not stop the discomfort    -You bump or injure the surgical site   -You develop a fever above 100 degrees   -You get your dressings wet    9.) Your activity level is:   _____Weightbearing as tolerated on _________ foot with surgical shoe   __X___Partial weight bearing to ___RIGHT_______ heel with surgical shoe & assistive device   _____Non weight bearing on ___________ foot with crutches    10.) Your return appointment with Dr. Radha Mayberry is:   ______________________________________________________________________    Anesthesia Discharge Instructions    After general anesthesia or intervenous sedation, for 24 hours or while taking prescription Narcotics:  · Limit your activities  · Do not drive or operate hazardous machinery  · If you have not urinated within 8 hours after discharge, please contact your surgeon on call. · Do not make important personal or business decisions  · Do not drink alcoholic beverages    Report the following to your surgeon:  · Excessive pain, swelling, redness or odor of or around the surgical area  · Temperature over 100.5 degrees  · Nausea and vomiting lasting longer than 4 hours or if unable to take medication  · Any signs of decreased circulation or nerve impairment to extremity:  Change in color, persistent numbness, tingling, coldness or increased pain.   · Any questions         __Friday, 10/11/19 @ [address] @ 11:30am_________________________________________________

## 2019-10-08 NOTE — ANESTHESIA POSTPROCEDURE EVALUATION
Post-Anesthesia Evaluation and Assessment    Patient: Vladimir Johns MRN: [id number]  SSN: [id number]    YOB: 1935  Age: 81 y.o. Sex: male      I have evaluated the patient and they are stable and ready for discharge from the PACU. Cardiovascular Function/Vital Signs  Visit Vitals  /47   Pulse 61   Temp 36.1 °C (96.9 °F)   Resp 13   Ht 5' 10\" (1.778 m)   Wt 61.7 kg (136 lb 2 oz)   SpO2 100%   BMI 19.53 kg/m²       Patient is status post MAC anesthesia for Procedure(s):  RESECTION OF RIGHT 1ST METATARSAL HEAD, SESAMOID BONES. Nausea/Vomiting: None    Postoperative hydration reviewed and adequate. Pain:  Pain Scale 1: Numeric (0 - 10) (10/08/19 1655)  Pain Intensity 1: 0 (10/08/19 1655)   Managed    Neurological Status:   Neuro (WDL): (s/p local injected operative foot) (10/08/19 1655)   At baseline    Mental Status, Level of Consciousness: Alert and  oriented to person, place, and time    Pulmonary Status:   O2 Device: Room air (10/08/19 1655)   Adequate oxygenation and airway patent    Complications related to anesthesia: None    Post-anesthesia assessment completed.  No concerns    Signed By: Mickey Davidson MD     October 8, 2019

## 2019-10-08 NOTE — PROGRESS NOTES
10/08/19 1609   Family Communication   Family Update Message Procedure started   Delivery Origin Nurse    Relationship to Patient Spouse  (\"[place]\")    Phone Number ex [phone]   Family/Significant Other Update Called

## 2019-10-08 NOTE — ANESTHESIA PREPROCEDURE EVALUATION
Anesthetic History   No history of anesthetic complications            Review of Systems / Medical History  Patient summary reviewed, nursing notes reviewed and pertinent labs reviewed    Pulmonary  Within defined limits                 Neuro/Psych   Within defined limits           Cardiovascular  Within defined limits            Hyperlipidemia    Exercise tolerance: >4 METS  Comments: EKG 1/19: Normal sinus rhythm   with 2nd degree AV block ([place] I)   Left axis deviation   Right bundle branch block   Cannot rule out Septal infarct   GI/Hepatic/Renal  Within defined limits       Renal disease: CRI       Endo/Other  Within defined limits    Hypothyroidism       Other Findings   Comments: Hx [facility]  Cardiology clearance obtained.            Physical Exam    Airway  Mallampati: II  TM Distance: 4 - 6 cm  Neck ROM: normal range of motion   Mouth opening: Normal     Cardiovascular  Regular rate and rhythm,  S1 and S2 normal,  no murmur, click, rub, or gallop             Dental  No notable dental hx       Pulmonary  Breath sounds clear to auscultation               Abdominal  GI exam deferred       Other Findings            Anesthetic Plan    ASA: 3  Anesthesia type: MAC          Induction: Intravenous  Anesthetic plan and risks discussed with: Patient and Spouse

## 2019-10-09 NOTE — OP NOTES
[address]   OPERATIVE REPORT    Name:  Weston Ly  MR#:  [id number]  :  1935  ACCOUNT #:  [de-identified]  DATE OF SERVICE:  10/08/2019    PREOPERATIVE DIAGNOSIS:  Osteomyelitis, right first metatarsophalangeal joint. POSTOPERATIVE DIAGNOSIS:  Osteomyelitis, right first metatarsophalangeal joint. PROCEDURE PERFORMED:  Resection of right first metatarsal head with sesamoid bones. SURGEON:  Izaiah Hayes DPM.    ASSISTANT:   Khadijah Deluna DPM, PGY-2. ANESTHESIA:  MAC with 20 mL of 1% lidocaine plain. COMPLICATIONS:  None. SPECIMENS REMOVED:  Include bone and soft tissue, right foot. IMPLANTS:  none    ESTIMATED BLOOD LOSS:  Minimal.    HEMOSTASIS:  Right pneumatic ankle tourniquet set at 250 mmHg for a total time of 33 minutes. MATERIALS USED:  Included 3-0 Vicryl, 3-0 nylon, and 1/4 inch iodoform packing. INJECTABLES:  Included 10 mL of 0.5% Marcaine plain. INDICATIONS FOR PROCEDURE:  This is an 78-year-old male with a history of a plantarflexed right first metatarsophalangeal joint resulting in a prominent first metatarsal head plantarly. He presents with a chronic submet 1 ulcer as a result of callus. This ulcer had become deeper and probed to bone. MRI revealed osteomyelitis of the sesamoid bone and mild increased signal activity in the first metatarsal head. He is here today for surgical intervention. DESCRIPTION OF PROCEDURE:  After the patient was properly identified by myself and my initials were placed on the right foot, he was brought back to the operating room under mild sedation. Once in the operating room, he was transferred from the stretcher and placed onto the operating table in supine position. Next, pneumatic ankle tourniquet was placed on the right ankle and preset to 250 mmHg.   Once given approval by the anesthesiologist, local anesthesia was administered in the form of 20 mL of 1% lidocaine plain, which was used to perform a right Henley block. Next, the right foot was then prepped and draped in normal aseptic fashion. Once the right foot was exsanguinated and tourniquet was inflated, a 15 blade was used to make a midline incision over the right first metatarsophalangeal joint area. The incision was deepened using a 15 blade down to the capsule and then the capsule was incised using a 15 blade and the underlying joint was exposed. A combination of 15 blade and McGlamry elevator were used to free the first metatarsal head from the base of the proximal phalanx. The head appeared to be eroded due to chronic gout. The sagittal saw was then used to resect the first metatarsal head. This piece of bone was then passed off to the surgical table. Next, a fresh 15 blade was used to remove the medial and lateral sesamoid bones. New pieces of bone as well as the first metatarsal head were then sent to pathology for analysis. Surgical site was then thoroughly irrigated with normal sterile saline mixed with gentamicin solution. Soft tissue cultures were taken of the area. The deep tissue was repaired using 3-0 Vicryl and the skin was repaired using 3-0 nylon in horizontal mattress type fashion. Attention was then focused to the plantar submet 1 ulceration. A 15 blade was used to excise this ulcer down to the underlying bone. Next, the wound was then packed with 1/4 inch iodoform packing and 10 mL of 0.5% Marcaine plain was injected into the surgical site for additional postoperative anesthesia. The foot was then dressed with Xeroform, 4 x 4 gauze, Kerlix, ABD pad, and a 4-inch ACE wrap. The tourniquet was let down, total time of 33 minutes. The patient tolerated the procedure well and was transferred to the recovery room, afebrile with vital signs stable and capillary refill intact to all remaining toes on the right foot.   He was given strict instructions to be partial weightbearing to the right heel using a surgical daniel.  He was given prescriptions for Norco 5/325 mg, total of 40 tablets and Augmentin 875 mg, total of 14 tablets. He will follow up with me on Friday for his first postoperative check.       ARMANI [email or web address]  D:  10/08/2019 20:47  T:  10/09/2019 6:20  JOB #:  [id number]

## 2019-10-10 LAB
BACTERIA SPEC CULT: NORMAL
SERVICE CMNT-IMP: NORMAL

## 2019-10-11 LAB
BACTERIA SPEC CULT: ABNORMAL
GRAM STN SPEC: ABNORMAL
GRAM STN SPEC: ABNORMAL
SERVICE CMNT-IMP: ABNORMAL

## 2019-11-01 ENCOUNTER — HOSPITAL ENCOUNTER (INPATIENT)
Age: 84
LOS: 3 days | Discharge: HOME | DRG: 641 | End: 2019-11-04
Attending: STUDENT IN AN ORGANIZED HEALTH CARE EDUCATION/TRAINING PROGRAM | Admitting: INTERNAL MEDICINE
Payer: MEDICARE

## 2019-11-01 ENCOUNTER — APPOINTMENT (OUTPATIENT)
Dept: ULTRASOUND IMAGING | Age: 84
DRG: 641 | End: 2019-11-01
Attending: STUDENT IN AN ORGANIZED HEALTH CARE EDUCATION/TRAINING PROGRAM
Payer: MEDICARE

## 2019-11-01 DIAGNOSIS — R79.89 ELEVATED LFTS: ICD-10-CM

## 2019-11-01 DIAGNOSIS — E87.5 HYPERKALEMIA: Primary | ICD-10-CM

## 2019-11-01 LAB
ALBUMIN SERPL-MCNC: 3.5 G/DL (ref 3.5–5)
ALBUMIN/GLOB SERPL: 0.8 {RATIO} (ref 1.1–2.2)
ALP SERPL-CCNC: 237 U/L (ref 45–117)
ALT SERPL-CCNC: 141 U/L (ref 12–78)
ANION GAP BLD CALC-SCNC: 17 MMOL/L (ref 10–20)
ANION GAP SERPL CALC-SCNC: 4 MMOL/L (ref 5–15)
ANION GAP SERPL CALC-SCNC: 6 MMOL/L (ref 5–15)
APPEARANCE UR: CLEAR
AST SERPL-CCNC: 348 U/L (ref 15–37)
BACTERIA URNS QL MICRO: NEGATIVE /HPF
BASOPHILS # BLD: 0 K/UL (ref 0–0.1)
BASOPHILS NFR BLD: 0 % (ref 0–1)
BILIRUB SERPL-MCNC: 0.8 MG/DL (ref 0.2–1)
BILIRUB UR QL: NEGATIVE
BUN BLD-MCNC: 41 MG/DL (ref 9–20)
BUN SERPL-MCNC: 42 MG/DL (ref 6–20)
BUN SERPL-MCNC: 44 MG/DL (ref 6–20)
BUN/CREAT SERPL: 8 (ref 12–20)
BUN/CREAT SERPL: 8 (ref 12–20)
CA-I BLD-MCNC: 1.32 MMOL/L (ref 1.12–1.32)
CALCIUM SERPL-MCNC: 10 MG/DL (ref 8.5–10.1)
CALCIUM SERPL-MCNC: 9.7 MG/DL (ref 8.5–10.1)
CHLORIDE BLD-SCNC: 112 MMOL/L (ref 98–107)
CHLORIDE SERPL-SCNC: 113 MMOL/L (ref 97–108)
CHLORIDE SERPL-SCNC: 114 MMOL/L (ref 97–108)
CK SERPL-CCNC: 37 U/L (ref 39–308)
CO2 BLD-SCNC: 19 MMOL/L (ref 21–32)
CO2 SERPL-SCNC: 21 MMOL/L (ref 21–32)
CO2 SERPL-SCNC: 23 MMOL/L (ref 21–32)
COLOR UR: ABNORMAL
COMMENT, HOLDF: NORMAL
COMMENT, HOLDF: NORMAL
CREAT BLD-MCNC: 6.3 MG/DL (ref 0.6–1.3)
CREAT SERPL-MCNC: 5.49 MG/DL (ref 0.7–1.3)
CREAT SERPL-MCNC: 5.63 MG/DL (ref 0.7–1.3)
DIFFERENTIAL METHOD BLD: ABNORMAL
EOSINOPHIL # BLD: 0 K/UL (ref 0–0.4)
EOSINOPHIL NFR BLD: 0 % (ref 0–7)
EPITH CASTS URNS QL MICRO: ABNORMAL /LPF
ERYTHROCYTE [DISTWIDTH] IN BLOOD BY AUTOMATED COUNT: 16.6 % (ref 11.5–14.5)
GLOBULIN SER CALC-MCNC: 4.6 G/DL (ref 2–4)
GLUCOSE BLD-MCNC: 96 MG/DL (ref 65–100)
GLUCOSE SERPL-MCNC: 126 MG/DL (ref 65–100)
GLUCOSE SERPL-MCNC: 98 MG/DL (ref 65–100)
GLUCOSE UR STRIP.AUTO-MCNC: 100 MG/DL
HCT VFR BLD AUTO: 30.6 % (ref 36.6–50.3)
HCT VFR BLD CALC: 29 % (ref 36.6–50.3)
HGB BLD-MCNC: 9.1 G/DL (ref 12.1–17)
HGB UR QL STRIP: ABNORMAL
HYALINE CASTS URNS QL MICRO: ABNORMAL /LPF (ref 0–5)
IMM GRANULOCYTES # BLD AUTO: 0.9 K/UL (ref 0–0.04)
IMM GRANULOCYTES NFR BLD AUTO: 5 % (ref 0–0.5)
KETONES UR QL STRIP.AUTO: NEGATIVE MG/DL
LEUKOCYTE ESTERASE UR QL STRIP.AUTO: NEGATIVE
LYMPHOCYTES # BLD: 2.3 K/UL (ref 0.8–3.5)
LYMPHOCYTES NFR BLD: 13 % (ref 12–49)
MCH RBC QN AUTO: 30 PG (ref 26–34)
MCHC RBC AUTO-ENTMCNC: 29.7 G/DL (ref 30–36.5)
MCV RBC AUTO: 101 FL (ref 80–99)
MONOCYTES # BLD: 6.3 K/UL (ref 0–1)
MONOCYTES NFR BLD: 36 % (ref 5–13)
NEUTS SEG # BLD: 7.9 K/UL (ref 1.8–8)
NEUTS SEG NFR BLD: 46 % (ref 32–75)
NITRITE UR QL STRIP.AUTO: NEGATIVE
NRBC # BLD: 0 K/UL (ref 0–0.01)
NRBC BLD-RTO: 0 PER 100 WBC
PATH REV BLD -IMP: ABNORMAL
PH UR STRIP: 8 [PH] (ref 5–8)
PLATELET # BLD AUTO: 57 K/UL (ref 150–400)
PMV BLD AUTO: 12.6 FL (ref 8.9–12.9)
POTASSIUM BLD-SCNC: 5.5 MMOL/L (ref 3.5–5.1)
POTASSIUM SERPL-SCNC: 4.7 MMOL/L (ref 3.5–5.1)
POTASSIUM SERPL-SCNC: 5.4 MMOL/L (ref 3.5–5.1)
PROT SERPL-MCNC: 8.1 G/DL (ref 6.4–8.2)
PROT UR STRIP-MCNC: 100 MG/DL
RBC # BLD AUTO: 3.03 M/UL (ref 4.1–5.7)
RBC #/AREA URNS HPF: ABNORMAL /HPF (ref 0–5)
RBC MORPH BLD: ABNORMAL
SAMPLES BEING HELD,HOLD: NORMAL
SAMPLES BEING HELD,HOLD: NORMAL
SERVICE CMNT-IMP: ABNORMAL
SODIUM BLD-SCNC: 142 MMOL/L (ref 136–145)
SODIUM SERPL-SCNC: 138 MMOL/L (ref 136–145)
SODIUM SERPL-SCNC: 143 MMOL/L (ref 136–145)
SP GR UR REFRACTOMETRY: 1.01 (ref 1–1.03)
UR CULT HOLD, URHOLD: NORMAL
UROBILINOGEN UR QL STRIP.AUTO: 0.2 EU/DL (ref 0.2–1)
WBC # BLD AUTO: 17.4 K/UL (ref 4.1–11.1)
WBC URNS QL MICRO: ABNORMAL /HPF (ref 0–4)

## 2019-11-01 PROCEDURE — 99285 EMERGENCY DEPT VISIT HI MDM: CPT

## 2019-11-01 PROCEDURE — 80047 BASIC METABLC PNL IONIZED CA: CPT

## 2019-11-01 PROCEDURE — 80053 COMPREHEN METABOLIC PANEL: CPT

## 2019-11-01 PROCEDURE — 74011250636 HC RX REV CODE- 250/636: Performed by: STUDENT IN AN ORGANIZED HEALTH CARE EDUCATION/TRAINING PROGRAM

## 2019-11-01 PROCEDURE — 77030003560 HC NDL HUBR BARD -A

## 2019-11-01 PROCEDURE — 85025 COMPLETE CBC W/AUTO DIFF WBC: CPT

## 2019-11-01 PROCEDURE — 74011250636 HC RX REV CODE- 250/636: Performed by: INTERNAL MEDICINE

## 2019-11-01 PROCEDURE — 82550 ASSAY OF CK (CPK): CPT

## 2019-11-01 PROCEDURE — 65270000032 HC RM SEMIPRIVATE

## 2019-11-01 PROCEDURE — 36415 COLL VENOUS BLD VENIPUNCTURE: CPT

## 2019-11-01 PROCEDURE — 74011250637 HC RX REV CODE- 250/637: Performed by: NURSE PRACTITIONER

## 2019-11-01 PROCEDURE — 81001 URINALYSIS AUTO W/SCOPE: CPT

## 2019-11-01 PROCEDURE — 93005 ELECTROCARDIOGRAM TRACING: CPT

## 2019-11-01 PROCEDURE — 76770 US EXAM ABDO BACK WALL COMP: CPT

## 2019-11-01 RX ORDER — SODIUM CHLORIDE 9 MG/ML
75 INJECTION, SOLUTION INTRAVENOUS CONTINUOUS
Status: DISCONTINUED | OUTPATIENT
Start: 2019-11-01 | End: 2019-11-02

## 2019-11-01 RX ORDER — VANCOMYCIN HYDROCHLORIDE 250 MG/5ML
125 POWDER, FOR SOLUTION ORAL EVERY 6 HOURS
COMMUNITY

## 2019-11-01 RX ORDER — VANCOMYCIN HYDROCHLORIDE 250 MG/5ML
125 POWDER, FOR SOLUTION ORAL EVERY 6 HOURS
Status: DISCONTINUED | OUTPATIENT
Start: 2019-11-01 | End: 2019-11-04 | Stop reason: HOSPADM

## 2019-11-01 RX ORDER — SODIUM CHLORIDE 0.9 % (FLUSH) 0.9 %
5-40 SYRINGE (ML) INJECTION AS NEEDED
Status: DISCONTINUED | OUTPATIENT
Start: 2019-11-01 | End: 2019-11-04 | Stop reason: HOSPADM

## 2019-11-01 RX ORDER — HYDROCODONE BITARTRATE AND ACETAMINOPHEN 5; 325 MG/1; MG/1
1 TABLET ORAL
Status: DISCONTINUED | OUTPATIENT
Start: 2019-11-01 | End: 2019-11-04 | Stop reason: HOSPADM

## 2019-11-01 RX ORDER — ONDANSETRON 2 MG/ML
4 INJECTION INTRAMUSCULAR; INTRAVENOUS
Status: DISCONTINUED | OUTPATIENT
Start: 2019-11-01 | End: 2019-11-04 | Stop reason: HOSPADM

## 2019-11-01 RX ORDER — SODIUM BICARBONATE 650 MG/1
650 TABLET ORAL 4 TIMES DAILY
COMMUNITY

## 2019-11-01 RX ORDER — SODIUM CHLORIDE 0.9 % (FLUSH) 0.9 %
5-40 SYRINGE (ML) INJECTION EVERY 8 HOURS
Status: DISCONTINUED | OUTPATIENT
Start: 2019-11-01 | End: 2019-11-04 | Stop reason: HOSPADM

## 2019-11-01 RX ADMIN — Medication 10 ML: at 16:10

## 2019-11-01 RX ADMIN — SODIUM CHLORIDE 250 ML: 900 INJECTION, SOLUTION INTRAVENOUS at 16:15

## 2019-11-01 RX ADMIN — SODIUM CHLORIDE 75 ML/HR: 900 INJECTION, SOLUTION INTRAVENOUS at 16:04

## 2019-11-01 RX ADMIN — VANCOMYCIN HYDROCHLORIDE 125 MG: KIT at 18:24

## 2019-11-01 NOTE — ED TRIAGE NOTES
Pt sent by pcp for kidney failure and possible need for dialysis, pt got a call from the nephrology office today and was told his Creat was 5 and his Potassium was 5.8 , pt denies n/v, denies pain , denies sob

## 2019-11-01 NOTE — CONSULTS
[address]    Name:  Kay Santacruz  MR#:  [id number]  :  1935  ACCOUNT #:  [de-identified]  DATE OF SERVICE:  2019    REASON FOR CONSULTATION:  Seen for renal failure. HISTORY OF PRESENT ILLNESS:  Thanks for the consult. We had the pleasure of seeing the patient, a very pleasant talat. He had CKD stage IV-V. Creatinine has been since going up from 3.2 beginning of the year to 5 recently. He has got a lot of other issues going on. He has anal cancer, CML. No active treatment for his cancers, but he sees Dr. Shawna Persaud. Recently found to have high potassium. Also he is on ciprofloxacin for infection of his right foot. Came with diarrhea, was diagnosed with C. diff, and found to have a potassium of 5.4, BUN 42, creatinine 5.4. Albumin is 3.5, calcium is 10, with an elevated globulin level. Ultrasound of his kidneys were done, which showed right kidney 7.6 cm, left kidney 9 cm. Simple renal cyst, no hydronephrosis. PAST MEDICAL HISTORY:  Includes  1. 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  2.  Hypertension. 3.  CML. 4.  Anal cancer. 5.  Prostate cancer. 6.  Hypothyroidism. 7.  Foot surgery. 8.  Prostatectomy. MEDICATIONS:  As outpatient, looks like he had sodium bicarb before and Epogen medication as inpatient. He is on sterile IV fluid. ALLERGIES:  NO KNOWN ALLERGY. SOCIAL HISTORY:  Reviewed. FAMILY HISTORY:  Reviewed. REVIEW OF SYSTEMS:  Look at the HPI, rest is negative. PHYSICAL EXAMINATION:  GENERAL:  Not in acute distress. VITAL SIGNS:  Blood pressure 128/55, sat 100%. NECK:  JVD is negative. LUNGS:  Clear. ABDOMEN:  Slightly tender. EXTREMITIES:  Right foot wrapped. Left foot, no edema. LABORATORY DATA:  As follows: We have sodium 138, potassium 5.8, BUN is 42, creatinine 5.4, calcium is 10, albumin is 3.5. Hemoglobin is 9.1, WBC is 17.4, platelets 57. ALT is 141, , alk phos is elevated. ASSESSMENT:  1.   Chronic kidney disease stage V.  2.  Acute kidney injury. 3.  Edema. 4.  Diarrhea. He was told he has Clostridium difficile. 5.  Mild hyperkalemia. 6.  Chronic myelogenous leukemia. 7.  Anal cancer. RECOMMENDATION:  As follows,  1. It is of note the patient has slightly asymmetric kidney. 2.  IV fluid, normal saline. 3.  Repeat potassium today. 4.  Treatment for his diarrhea/C. diff. 5.  We will follow, but hopefully avoid dialysis this admission.       Andrew Jameson MD      [email or web address]  D:  11/01/2019 15:29  T:  11/01/2019 19:38  JOB #:  [id number]  CC:

## 2019-11-01 NOTE — CONSULTS
[address] [place] [address] NOTE  Declan Bray [facility] office  [phone] NP in-hospital cell phone M-F until 4:30  After 5pm or on weekends, please call  for physician on call        NAME:  Bk Boo   :   1935   MRN:   [id number]       Referring Physician: Roosevelt Ansari Date: 2019 4:59 PM     Chief Complaint: C. Diff. History of Present Illness:  Patient is a 81 y.o. who is seen in consultation at the request of Dr. Candi Coley for C. Diff Infection. Patient presented today to the ED at the request of his primary care physician for hyperkalemia and acute kidney injury. He is seen today by GI for persistent C. Difficile infection. Patient had C. Diff in  and failed flagyl and PO Vanc. He then underwent two fecal transplants via dobhoff because the first was unsuccessful. He then had eighteen months without infection. In 2019, patient developed toe infection and was given 7 day course of antibiotics which caused him to develop C. Diff infection. He was restarted on PO Vancomycin and has been on 4x daily dosing since his toe infection, approximately 2 months. Patient is followed closely by PCP for his C. Diff. Infection, he was seen by Dr. Jaswinder Tejeda with initial episodes c. diff. Patient denies abdominal pain, nausea, vomiting and has normal appetite. He states that over the past three weeks has begun having 3 stools per day that are watery. Pt denies recent tropical travel, sick contacts, or similar illness in others eating same meal.    I have reviewed the emergency room note, hospital admission note, notes by all other clinicians who have seen the patient during this hospitalization to date. I have reviewed the problem list and the reason for this hospitalization.  I have reviewed the allergies and the medications the patient was taking at home prior to this hospitalization. PMH:  Past Medical History:   Diagnosis Date    Arthritis     Cancer ([facility] 2018 (LAST ONE)    MELANOMA - SEVERAL    Cancer ([facility] 2008    PROSTATE    Cancer ([facility] 2015    Squamous cell carcinoma of rectum    Cancer ([facility] 2005    POSSIBLE THYROID (PATIENT IS UNSURE)    Cancer ([facility] 2009    CMML    Chronic kidney disease 2017    RENAL FAILURE    Chronic kidney disease 01/2019    CYST ABLATION ON KIDNEY    CKD (chronic kidney disease) stage 3, GFR 30-59 ml/min ([facility] 6/5/2017    Hyperlipidemia     Ill-defined condition     CHRONIC MYELMONOCYTIC LEUKEMIA    Ill-defined condition 08/2019    RIGHT GREAT TOE BONE INFECTION  - WILL HAVE SURGERY IN OCTOBER 2019 (NOT SET YET)    Ill-defined condition 09/2019    FECAL TRANSPLANT    SCC (squamous cell carcinoma)     scalp    Thyroid disease     HYPOTHYROIDISM       PSH:  Past Surgical History:   Procedure Laterality Date    HX ABDOMINAL WALL DEFECT REPAIR  7923    UMBILICAL    HX AMPUTATION Right 2017    baby toe on right foot    HX APPENDECTOMY  1950s     HX OTHER SURGICAL  2002    BENIGN TUMOR THYROID; parathyroid removal    HX OTHER SURGICAL      right foot surgery     HX PROSTATECTOMY  2009    HX ROTATOR CUFF REPAIR Right 2005    HX TONSILLECTOMY  CHILDHOOD       Allergies:  No Known Allergies    Home Medications:  Prior to Admission Medications   Prescriptions Last Dose Informant Patient Reported? Taking? Cholecalciferol, Vitamin D3, (VITAMIN D) 1,000 unit Cap 11/1/2019 at Unknown time  Yes Yes   Sig: Take  by mouth daily. Lactobacillus acidophilus (PROBIOTIC PO) 11/1/2019 at Unknown time  Yes Yes   Sig: Take 2 Doses by mouth three (3) times daily. Probiotic gummies   atorvastatin (LIPITOR) 10 mg tablet 11/1/2019 at Unknown time  Yes Yes   Sig: Take 10 mg by mouth daily. epoetin jaquelin (PROCRIT INJECTION) 10/29/2019  Yes Yes   Sig: by Injection route.  Every 6 weeks   levothyroxine (SYNTHROID) 112 mcg tablet 11/1/2019 at 0700  Yes Yes   Sig: Take 112 mcg by mouth Daily (before breakfast). sodium bicarbonate 650 mg tablet 11/1/2019 at Unknown time  Yes Yes   Sig: Take 650 mg by mouth four (4) times daily. vancomycin [facility]) 50 mg/mL oral solution 11/1/2019 at Unknown time  Yes Yes   Sig: Take 125 mg by mouth every six (6) hours.  Due to taper down on Monday 11/04   Indications: diarrhea from an infection with Clostridium difficile bacteria      Facility-Administered Medications: None       Hospital Medications:  Current Facility-Administered Medications   Medication Dose Route Frequency    sodium chloride (NS) flush 5-40 mL  5-40 mL IntraVENous Q8H    sodium chloride (NS) flush 5-40 mL  5-40 mL IntraVENous PRN    HYDROcodone-acetaminophen (NORCO) 5-325 mg per tablet 1 Tab  1 Tab Oral Q4H PRN    ondansetron (ZOFRAN) injection 4 mg  4 mg IntraVENous Q4H PRN    0.9% sodium chloride infusion  75 mL/hr IntraVENous CONTINUOUS       Social History:  Social History     Tobacco Use    Smoking status: Never Smoker    Smokeless tobacco: Never Used   Substance Use Topics    Alcohol use: Yes     Comment: OCC       Family History:  Family History   Problem Relation Age of Onset    Alcohol abuse Mother     Hypertension Mother     Heart Disease Mother     Cancer Father     Bleeding Prob Father         LEUKEMIA    Cancer Sister         breast    Cancer Brother         COLON    No Known Problems Sister     Anesth Problems Neg Hx        Review of Systems:    Constitutional: negative fever, negative chills, negative weight loss  Eyes:   negative visual changes  ENT:   negative sore throat, tongue or lip swelling  Respiratory:  negative cough, negative dyspnea  Cards:  negative for chest pain, palpitations, lower extremity edema  GI:   See HPI  :  negative for frequency, dysuria  Integument:  negative for rash and pruritus  Heme:  negative for easy bruising and gum/nose bleeding  Musculoskeletal: negative for myalgias, back pain and muscle weakness  Neuro:             negative for headaches, dizziness, vertigo  Psych:  negative for feelings of anxiety, depression     Objective:     Patient Vitals for the past 8 hrs:   BP Temp Pulse Resp SpO2   11/01/19 1530 117/68 97.5 °F (36.4 °C) 73 18 98 %   11/01/19 1430 128/55 98.5 °F (36.9 °C) 71 21 100 %   11/01/19 1415   73 18 100 %   11/01/19 1400 129/66  96 21 100 %   11/01/19 1345   77 18 99 %   11/01/19 1330 121/55  72 12 100 %   11/01/19 1315   75 21 100 %   11/01/19 1300 124/54  71 18 99 %   11/01/19 1245   85 24 99 %   11/01/19 1230 115/52  74 17 100 %   11/01/19 1215   73 19 100 %   11/01/19 1200 121/53  78 14 100 %   11/01/19 1145   79 16 100 %   11/01/19 1130 118/54  81 (!) 7 99 %   11/01/19 1100 124/62  82 17 99 %   11/01/19 1048 107/64 98 °F (36.7 °C) 94 16 98 %     No intake/output data recorded. No intake/output data recorded. EXAM:     CONST:  Pleasant male lying in bed, no acute distress   NEURO:  alert and oriented x 3   HEENT: EOMI, no scleral icterus   LUNGS: clear to ausculation, (-) wheeze   CARD:  S1 S2   ABD:  soft, no tenderness, no rebound, bowel sounds (+) all 4 quadrants, no masses,    non distended   EXT:  no edema, warm   PSYCH: full, not anxious     Data Review     Recent Labs     11/01/19  1222   WBC 17.4*   HGB 9.1*   HCT 30.6*   PLT 57*     Recent Labs     11/01/19  1222      K 5.4*   *   CO2 21   BUN 42*   CREA 5.49*   GLU 98   CA 10.0     Recent Labs     11/01/19  1222   SGOT 348*   *   TP 8.1   ALB 3.5   GLOB 4.6*     No results for input(s): INR, PTP, APTT, INREXT in the last 72 hours. 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  Diff infection after receiving Abx for toe infection, on PO Vanc for approximately 2 months  · Leukocytosis - WBC 17.4  · Acute/Chronic Kidney disease - Cr 5.49 on admission  · Hyperkalemia with Hx CKD - K 5.4 on admission     Patient Active Problem List   Diagnosis Code    Neuropathy G62.9    Infection of right foot L08.9    Hypothyroidism E03.9    Hyperlipidemia E78.5    CMML (chronic myelomonocytic leukemia) (HCC) C93.10    CKD (chronic kidney disease) stage 3, GFR 30-59 ml/min (HCC) N18.3    Hyperkalemia E87.5     Plan:       · PO Vancomycin  · Consult ID - ? Dificid, discussed with pharmacy only ID can order dificid  · Trend WBC count - currently 17.4  · Patient discussed with and will be seen by Dr. Johana Mary  · Thank you for allowing me to participate in care of Efren Barbosa     Signed By: Patito Griffin     11/1/2019  4:59 PM       I reviewed records, evaluated patient, and developed plan with LAUREN Don [facility]     Gastroenterology Attending Physician attestation statement and comments. This patient was seen and examined by me in a face-to-face visit today. I reviewed the medical record including lab work, imaging and other provider notes. I confirmed the history as described above. I spoke to the patient, reviewed the medical record including lab work, imaging and other provider notes. I discussed this case in detail with Betty ADRIAN. I formulated an  assessment of this patient and developed a treatment plan. I agree with the above consultation note. I agree with the history, exam and assessment and plan as outlined in the note. I would like to add the following:     Abd: increased bowel sounds, mild distention, nt, no rebound/guarding. Recurrent C diff-only had 2 BMs today-on Vancomycin taper. ID consult. Will monitor clinical course. If needs FMT, likely can be done as outpatient with primary GI-Dr. Cade Person. Monitor hepatic function panel, ultrasound pending. Weekend coverage to follow.     Dr. Johana Mary

## 2019-11-01 NOTE — ROUTINE PROCESS
TRANSFER - OUT REPORT: 
 
Verbal report given to Adrian(name) on Nancy Castle  being transferred to (unit) for routine progression of care Report consisted of patients Situation, Background, Assessment and  
Recommendations(SBAR). Information from the following report(s) ED Summary and Recent Results was reviewed with the receiving nurse. Lines:  
Peripheral IV 11/01/19 Forearm (Active) Site Assessment Clean, dry, & intact 11/1/2019 12:31 PM  
Phlebitis Assessment 0 11/1/2019 12:31 PM  
Infiltration Assessment 0 11/1/2019 12:31 PM  
Dressing Status Clean, dry, & intact 11/1/2019 12:31 PM  
Dressing Type Tape 11/1/2019 12:31 PM  
Hub Color/Line Status Pink;Flushed 11/1/2019 12:31 PM  
Action Taken Blood drawn 11/1/2019 12:31 PM  
Alcohol Cap Used Yes 11/1/2019 12:31 PM  
  
 
Opportunity for questions and clarification was provided. Patient transported with: 
 [place]

## 2019-11-01 NOTE — PROGRESS NOTES
Admission Medication Reconciliation:    Information obtained from:  Patient/Wife  RxQuery data available¹:  YES    Comments/Recommendations: Updated PTA meds/reviewed patient's allergies. 1)  Patient's wife is an excellent historian and caretaker of patient's medications. C.diff - FMT in Sept 2019. Patient has been on PO vancomycin for ~1 month and has been on a tapering dose. Currently 125 mg Q 6hours. Patient reports still with 3 x loose mucous stools per day. 2)  Medication changes (since last review): Added  - None    Adjusted  - None    Removed  - Augmentin (completed)  - VitB complex    3)  Confirmed NKDA status     ¹RxQuery pharmacy benefit data reflects medications filled and processed through the patient's insurance, however   this data does NOT capture whether the medication was picked up or is currently being taken by the patient. Allergies:  Patient has no known allergies.     Significant PMH/Disease States:   Past Medical History:   Diagnosis Date    Arthritis     Cancer ([facility] 2018 (LAST ONE)    MELANOMA - SEVERAL    Cancer ([facility] 2008    PROSTATE    Cancer ([facility] 2015    Squamous cell carcinoma of rectum    Cancer ([facility] 2005    POSSIBLE THYROID (PATIENT IS UNSURE)    Cancer ([facility] [address] [facility] 2009    CMML    Chronic kidney disease 2017    RENAL FAILURE    Chronic kidney disease 01/2019    CYST ABLATION ON KIDNEY    CKD (chronic kidney disease) stage 3, GFR 30-59 ml/min ([address] 6/5/2017    Hyperlipidemia     Ill-defined condition     CHRONIC MYELMONOCYTIC LEUKEMIA    Ill-defined condition 08/2019    RIGHT GREAT TOE BONE INFECTION  - WILL HAVE SURGERY IN OCTOBER 2019 (NOT SET YET)    Ill-defined condition 09/2019    FECAL TRANSPLANT    SCC (squamous cell carcinoma)     scalp    Thyroid disease     HYPOTHYROIDISM     Chief Complaint for this Admission:    Chief Complaint   Patient presents with    Abnormal Lab Results     Prior to Admission Medications:   Prior to Admission Medications   Prescriptions Last Dose Informant Patient Reported? Taking? Cholecalciferol, Vitamin D3, (VITAMIN D) 1,000 unit Cap 11/1/2019 at Unknown time  Yes Yes   Sig: Take  by mouth daily. Lactobacillus acidophilus (PROBIOTIC PO) 11/1/2019 at Unknown time  Yes Yes   Sig: Take 2 Doses by mouth three (3) times daily. Probiotic gummies   atorvastatin (LIPITOR) 10 mg tablet 11/1/2019 at Unknown time  Yes Yes   Sig: Take 10 mg by mouth daily. epoetin jaquelin (PROCRIT INJECTION) 10/29/2019  Yes Yes   Sig: by Injection route. Every 6 weeks   levothyroxine (SYNTHROID) 112 mcg tablet 11/1/2019 at 0700  Yes Yes   Sig: Take 112 mcg by mouth Daily (before breakfast). sodium bicarbonate 650 mg tablet 11/1/2019 at Unknown time  Yes Yes   Sig: Take 650 mg by mouth four (4) times daily. vancomycin [facility]) 50 mg/mL oral solution 11/1/2019 at Unknown time  Yes Yes   Sig: Take 125 mg by mouth every six (6) hours. Due to taper down on Monday 11/04   Indications: diarrhea from an infection with Clostridium difficile bacteria      Facility-Administered Medications: None     Please contact the main inpatient pharmacy with any questions or concerns at [phone] and we will direct you to the clinical pharmacist covering this patient's care while in-house.    Emile Schuler, BETSYD

## 2019-11-01 NOTE — CONSULTS
Seen and examined  Thanks for the consult  A/P:  CKD stage 5,creat up from mid 3's beginning of 2019 to 5   ,close to dialysis  VERONICA,prerenal  Mild hyperkalemia  CML  Foot infection  Diarrhea/Cdiff    IVF  Diarrhea work up  [facility] to avoid dialysis this admission Transport/1:2 Self

## 2019-11-01 NOTE — H&P
History and Physical    Primary Care Provider: Sera Carrasco MD    Subjective:     CC: Abnormal Labs    Aydin Trammell is a 81 y.o. male with PMH of CKD stage4, anal cancer treated with Rx and Chemo under close f/u. Prostatectomy for prostate Ca with cure. Was advised to attend ED by his nephrologist for elevated K and deranged LFTs. Denies new symptoms, no fever/chills, no sob/LUTs. Had multiple episodes of C.diff last 2 years, required fecal transplant x2, multiple abx courses. August had toe infection, given abx, then developed C.diff- on continuous vancomycin po now, still has 3 x/day loose stools, not much improved in last couple of weeks. This morning passed urine, over last several months noticed making less urine, gradually worsening. Review of Systems:  A comprehensive review of systems was negative except for that written in the History of Present Illness.      Past Medical History:   Diagnosis Date    Arthritis     Cancer ([address] 2018 (LAST ONE)    MELANOMA - SEVERAL    Cancer ([facility] 2008    PROSTATE    Cancer ([address] 2015    Squamous cell carcinoma of rectum    Cancer ([address] 2005    POSSIBLE THYROID (PATIENT IS UNSURE)    Cancer ([address] 2009    CMML    Chronic kidney disease 2017    RENAL FAILURE    Chronic kidney disease 01/2019    CYST ABLATION ON KIDNEY    CKD (chronic kidney disease) stage 3, GFR 30-59 ml/min ([address] 6/5/2017    Hyperlipidemia     Ill-defined condition     CHRONIC MYELMONOCYTIC LEUKEMIA    Ill-defined condition 08/2019    RIGHT GREAT TOE BONE INFECTION  - WILL HAVE SURGERY IN OCTOBER 2019 (NOT SET YET)    Ill-defined condition 09/2019    FECAL TRANSPLANT    SCC (squamous cell carcinoma)     scalp    Thyroid disease     HYPOTHYROIDISM      Past Surgical History:   Procedure Laterality Date    HX ABDOMINAL WALL DEFECT REPAIR  6147    UMBILICAL    HX AMPUTATION Right 2017    baby toe on right foot    HX APPENDECTOMY  1950s     HX OTHER SURGICAL  2002 BENIGN TUMOR THYROID; parathyroid removal    HX OTHER SURGICAL      right foot surgery     HX PROSTATECTOMY  2009    HX ROTATOR CUFF REPAIR Right 2005    HX TONSILLECTOMY  CHILDHOOD     Prior to Admission medications    Medication Sig Start Date End Date Taking? Authorizing Provider   Lactobacillus acidophilus (PROBIOTIC PO) Take 2 Doses by mouth three (3) times daily. Probiotic gummies   Yes Provider, Historical   vancomycin (FIRVANQ) 50 mg/mL oral solution Take 125 mg by mouth every six (6) hours. Due to taper down on Monday 11/04   Indications: diarrhea from an infection with Clostridium difficile bacteria   Yes Provider, Historical   sodium bicarbonate 650 mg tablet Take 650 mg by mouth four (4) times daily. Yes Provider, Historical   levothyroxine (SYNTHROID) 112 mcg tablet Take 112 mcg by mouth Daily (before breakfast). Yes Provider, Historical   epoetin jaquelin (PROCRIT INJECTION) by Injection route. Every 6 weeks   Yes Provider, Historical   atorvastatin (LIPITOR) 10 mg tablet Take 10 mg by mouth daily. Yes Provider, Historical   Cholecalciferol, Vitamin D3, (VITAMIN D) 1,000 unit Cap Take  by mouth daily. Yes Provider, Historical     No Known Allergies   Family Hx: -ve for Ca at young  SOCIAL HISTORY:  Patient resides at Home.    Smoking history: -ve  Alcohol history:   Objective:     Physical Exam:   /55   Pulse 71   Temp 98.5 °F (36.9 °C)   Resp 21   SpO2 100%   General:  Alert, oriented, No acute distress, pale, frail, WM  HEENT:  Pink conjunctivae, PERRL, hearing intact to voice, MM moist  Neck:  Supple, without masses, thyroid non-tender  Card:  S1, S2 without murmurs, good peripheral perfusion,   Resp:  No accessory muscle use, clear breath sounds without wheezes, no rhonchi  Abd:  Soft, non-tender, non-distended, BS+, no masses  Lymph:  No cervical or inguinal adenopathy  Extremities:  No cyanosis or clubbing, no significant edema  Skin:  No rashes or ulcers, skin turgor is good  Neuro:  Grossly normal, no focal neuro deficits, CNs intact, follows commands   Psych:  Good insight, oriented to person, place and time. ECG:  I personally reviewed: sinus rhythm. No acute ischemic changes. Data Review: All diagnostic labs and studies have been reviewed by myself personally. Imaging I personally reviewed: US renal: chronic kidney disease like. Assessment:     Principal Problem:    Hyperkalemia (11/1/2019)    Active Problems:    Hypothyroidism (5/31/2017)      Hyperlipidemia (5/31/2017)      CMML (chronic myelomonocytic leukemia) ([facility] (6/3/2017)      CKD (chronic kidney disease) stage 3, GFR 30-59 ml/min ([facility]) (6/5/2017)      Plan:     #. CKD stage 4/5: known to Dr Nolberto Tolliver- with reduced urine output. #. Hyperkalemia: likely 2nd to above. - ED called Nephrology, Dr Kenzie Avila would like pt admitted. Will follow  - Monitor K levels. Monitor Urine output. - Check bladder scan to r/o retention, check UA. #. C.diff: recurrent episodes, currently on Vancomycin po, consult GI  #. Deranged LFTs: check RUQ US, monitor    #. Hx of prostate Ca: s/p prostatectomy, presumed cured  #. Hx of anal Ca: under close supervision. S/p Rx and Chemo. #. Hypothyroidism: home dose synthroid, TSH pending  #.  HLD: chronic, Stable, Home regimen    Patient's Baseline: ambulates with walking  Code status: Full  DVT prophylaxis: SCD  Disposition: TBD    Signed By: Bernabe Dumont MD     November 1, 2019

## 2019-11-01 NOTE — ED PROVIDER NOTES
81 y.o. male with past medical history significant for hyperlipidemia, CKD, hypothyroid, prostate cancer, and leukemia who presents via private vehicle with chief complaint of abnormal lab results. Pt was advised by  to come to the ED this AM because of abnormal results on his blood work drawn last Friday (10/25/19). He reports he was told he had elevated creatinine and potassium. Pt reports feeling good this morning and has no current complaints. His wife reports he was vomiting on Tuesday night (10/29/19). He denies CP, abdominal pain, and hematuria. Per pt he has an appointment Thursday to have a dialysis fistula inserted. He has hx of a lesion in his kidney that was cryoablated. Agueda Gurrola has had recent surgery for bone infection in his toe. There are no other acute medical concerns at this time. Social hx: denies tobacco use, endorses EtOH    PCP: Nolan Feliciano MD  Nephrologist: Pascual Lanier MD    Note written by Souleymane Godoy, as dictated by Sissy Howard MD 12:10 PM        The history is provided by the patient. No  was used.         Past Medical History:   Diagnosis Date    Arthritis     Cancer ([address] 2018 (LAST ONE)    MELANOMA - SEVERAL    Cancer ([address] 2008    PROSTATE    Cancer ([address] 2015    Squamous cell carcinoma of rectum    Cancer ([address] 2005    POSSIBLE THYROID (PATIENT IS UNSURE)    Cancer ([address] 2009    CMML    Chronic kidney disease 2017    RENAL FAILURE    Chronic kidney disease 01/2019    CYST ABLATION ON KIDNEY    CKD (chronic kidney disease) stage 3, GFR 30-59 ml/min ([address] 6/5/2017    Hyperlipidemia     Ill-defined condition     CHRONIC MYELMONOCYTIC LEUKEMIA    Ill-defined condition 08/2019    RIGHT GREAT TOE BONE INFECTION  - WILL HAVE SURGERY IN OCTOBER 2019 (NOT SET YET)    Ill-defined condition 09/2019    FECAL TRANSPLANT    SCC (squamous cell carcinoma)     scalp    Thyroid disease     HYPOTHYROIDISM       Past Surgical History: Procedure Laterality Date    HX ABDOMINAL WALL DEFECT REPAIR  7492    UMBILICAL    HX AMPUTATION Right 2017    baby toe on right foot    HX APPENDECTOMY  1950s     HX OTHER SURGICAL  2002    BENIGN TUMOR THYROID; parathyroid removal    HX OTHER SURGICAL      right foot surgery     HX PROSTATECTOMY  2009    HX ROTATOR CUFF REPAIR Right 2005    HX TONSILLECTOMY  CHILDHOOD         Family History:   Problem Relation Age of Onset    Alcohol abuse Mother     Hypertension Mother     Heart Disease Mother     Cancer Father     Bleeding Prob Father         LEUKEMIA    Cancer Sister         breast    Cancer Brother         COLON    No Known Problems Sister     Anesth Problems Neg Hx        Social History     Socioeconomic History    Marital status:      Spouse name: Not on file    Number of children: Not on file    Years of education: Not on file    Highest education level: Not on file   Occupational History    Not on file   Social Needs    Financial resource strain: Not on file    Food insecurity:     Worry: Not on file     Inability: Not on file    Transportation needs:     Medical: Not on file     Non-medical: Not on file   Tobacco Use    Smoking status: Never Smoker    Smokeless tobacco: Never Used   Substance and Sexual Activity    Alcohol use: Yes     Comment: OCC    Drug use: No    Sexual activity: Not on file   Lifestyle    Physical activity:     Days per week: Not on file     Minutes per session: Not on file    Stress: Not on file   Relationships    Social connections:     Talks on phone: Not on file     Gets together: Not on file     Attends Jewish service: Not on file     Active member of club or organization: Not on file     Attends meetings of clubs or organizations: Not on file     Relationship status: Not on file    Intimate partner violence:     Fear of current or ex partner: Not on file     Emotionally abused: Not on file     Physically abused: Not on file Forced sexual activity: Not on file   Other Topics Concern    Not on file   Social History Narrative    Not on file         ALLERGIES: Patient has no known allergies. Review of Systems   Constitutional: Negative for chills and fever. HENT: Negative for sore throat. Eyes: Negative for pain. Respiratory: Negative for cough and shortness of breath. Cardiovascular: Negative for chest pain. Gastrointestinal: Positive for vomiting (resolved). Negative for abdominal pain. Genitourinary: Negative for decreased urine volume, dysuria and hematuria. Skin: Negative for rash. Neurological: Negative for syncope and headaches. Psychiatric/Behavioral: Negative for confusion. All other systems reviewed and are negative. Vitals:    11/01/19 1048   BP: 107/64   Pulse: 94   Resp: 16   Temp: 98 °F (36.7 °C)   SpO2: 98%            Physical Exam   Constitutional: He is oriented to person, place, and time. He appears well-developed. He appears ill (chronic). No distress. Elderly, thin   HENT:   Head: Normocephalic and atraumatic. Eyes: Conjunctivae and EOM are normal.   Neck: Normal range of motion. Neck supple. Cardiovascular: Normal rate, regular rhythm and normal heart sounds. Pulmonary/Chest: Effort normal and breath sounds normal. No respiratory distress. Abdominal: Soft. There is no tenderness. There is no guarding. Musculoskeletal: Normal range of motion. He exhibits no edema. L foot in post op shoe with bandaging   Neurological: He is alert and oriented to person, place, and time. He exhibits normal muscle tone. Skin: Skin is warm and dry. Vitals reviewed. Note written by Souleymane Fletcher, as dictated by Aris Mcdaniel MD 12:53 PM    MDM       Procedures    ED EKG interpretation: 11:02am  Rhythm: 1st degree AV block and RBBB; and regular . Rate (approx.): 83; Axis: left axis deviation; ST/T wave: T wave abnormal; No STEMI, no peaked T waves.     Note written by Rogelio Nieves Souleymane Marsh, as dictated by Sissy Howard MD 11:14 AM      CONSULT NOTE:  1:34 PM Sissy Howard MD spoke with Dr. Ayanna Thacker, Consult for Nephrology. Discussed available diagnostic tests and clinical findings. Dr. Ayanna Thacker recommends admission to the hospital.      [facility] Text for Admission  1:40 PM    ED Room Number: ER24/24  Patient Name and age:  Lucia Lopez 81 y.o.  male  Working Diagnosis:   1. Hyperkalemia    2.  Elevated LFTs      Readmission: yes  Isolation Requirements:  yes - contact  Recommended Level of Care:  telemetry  Code Status:  Full  Other:  Dr. Stewart Rogel consulting  [id number] [facility] Adult ED - [phone]

## 2019-11-02 ENCOUNTER — APPOINTMENT (OUTPATIENT)
Dept: ULTRASOUND IMAGING | Age: 84
DRG: 641 | End: 2019-11-02
Attending: INTERNAL MEDICINE
Payer: MEDICARE

## 2019-11-02 LAB
ALBUMIN SERPL-MCNC: 3.3 G/DL (ref 3.5–5)
ALBUMIN/GLOB SERPL: 0.8 {RATIO} (ref 1.1–2.2)
ALP SERPL-CCNC: 197 U/L (ref 45–117)
ALT SERPL-CCNC: 98 U/L (ref 12–78)
ANION GAP SERPL CALC-SCNC: 8 MMOL/L (ref 5–15)
AST SERPL-CCNC: 185 U/L (ref 15–37)
BILIRUB DIRECT SERPL-MCNC: 0.2 MG/DL (ref 0–0.2)
BILIRUB SERPL-MCNC: 0.8 MG/DL (ref 0.2–1)
BUN SERPL-MCNC: 44 MG/DL (ref 6–20)
BUN/CREAT SERPL: 8 (ref 12–20)
CALCIUM SERPL-MCNC: 9.3 MG/DL (ref 8.5–10.1)
CHLORIDE SERPL-SCNC: 115 MMOL/L (ref 97–108)
CO2 SERPL-SCNC: 17 MMOL/L (ref 21–32)
CREAT SERPL-MCNC: 5.43 MG/DL (ref 0.7–1.3)
ERYTHROCYTE [DISTWIDTH] IN BLOOD BY AUTOMATED COUNT: 16.5 % (ref 11.5–14.5)
FERRITIN SERPL-MCNC: 268 NG/ML (ref 26–388)
GLOBULIN SER CALC-MCNC: 4.1 G/DL (ref 2–4)
GLUCOSE SERPL-MCNC: 85 MG/DL (ref 65–100)
HCT VFR BLD AUTO: 27.4 % (ref 36.6–50.3)
HGB BLD-MCNC: 8.4 G/DL (ref 12.1–17)
IRON SATN MFR SERPL: 23 % (ref 20–50)
IRON SERPL-MCNC: 53 UG/DL (ref 35–150)
MCH RBC QN AUTO: 30.3 PG (ref 26–34)
MCHC RBC AUTO-ENTMCNC: 30.7 G/DL (ref 30–36.5)
MCV RBC AUTO: 98.9 FL (ref 80–99)
NRBC # BLD: 0 K/UL (ref 0–0.01)
NRBC BLD-RTO: 0 PER 100 WBC
PLATELET # BLD AUTO: 59 K/UL (ref 150–400)
PMV BLD AUTO: 12.4 FL (ref 8.9–12.9)
POTASSIUM SERPL-SCNC: 4.8 MMOL/L (ref 3.5–5.1)
PROT SERPL-MCNC: 7.4 G/DL (ref 6.4–8.2)
RBC # BLD AUTO: 2.77 M/UL (ref 4.1–5.7)
SODIUM SERPL-SCNC: 140 MMOL/L (ref 136–145)
TIBC SERPL-MCNC: 235 UG/DL (ref 250–450)
TSH SERPL DL<=0.05 MIU/L-ACNC: 0.71 UIU/ML (ref 0.36–3.74)
WBC # BLD AUTO: 12.4 K/UL (ref 4.1–11.1)

## 2019-11-02 PROCEDURE — 74011000250 HC RX REV CODE- 250: Performed by: INTERNAL MEDICINE

## 2019-11-02 PROCEDURE — 65270000032 HC RM SEMIPRIVATE

## 2019-11-02 PROCEDURE — 74011250636 HC RX REV CODE- 250/636: Performed by: INTERNAL MEDICINE

## 2019-11-02 PROCEDURE — 80048 BASIC METABOLIC PNL TOTAL CA: CPT

## 2019-11-02 PROCEDURE — 85027 COMPLETE CBC AUTOMATED: CPT

## 2019-11-02 PROCEDURE — 80076 HEPATIC FUNCTION PANEL: CPT

## 2019-11-02 PROCEDURE — 82728 ASSAY OF FERRITIN: CPT

## 2019-11-02 PROCEDURE — 76700 US EXAM ABDOM COMPLETE: CPT

## 2019-11-02 PROCEDURE — 51798 US URINE CAPACITY MEASURE: CPT

## 2019-11-02 PROCEDURE — 83540 ASSAY OF IRON: CPT

## 2019-11-02 PROCEDURE — 84443 ASSAY THYROID STIM HORMONE: CPT

## 2019-11-02 PROCEDURE — 74011250637 HC RX REV CODE- 250/637: Performed by: NURSE PRACTITIONER

## 2019-11-02 PROCEDURE — 36415 COLL VENOUS BLD VENIPUNCTURE: CPT

## 2019-11-02 RX ADMIN — SODIUM BICARBONATE: 84 INJECTION, SOLUTION INTRAVENOUS at 11:19

## 2019-11-02 RX ADMIN — SODIUM CHLORIDE 75 ML/HR: 900 INJECTION, SOLUTION INTRAVENOUS at 00:11

## 2019-11-02 RX ADMIN — VANCOMYCIN HYDROCHLORIDE 125 MG: KIT at 11:22

## 2019-11-02 RX ADMIN — SODIUM BICARBONATE: 84 INJECTION, SOLUTION INTRAVENOUS at 21:45

## 2019-11-02 RX ADMIN — EPOETIN ALFA-EPBX 10000 UNITS: 10000 INJECTION, SOLUTION INTRAVENOUS; SUBCUTANEOUS at 17:21

## 2019-11-02 RX ADMIN — VANCOMYCIN HYDROCHLORIDE 125 MG: KIT at 06:21

## 2019-11-02 RX ADMIN — VANCOMYCIN HYDROCHLORIDE 125 MG: KIT at 17:21

## 2019-11-02 RX ADMIN — VANCOMYCIN HYDROCHLORIDE 125 MG: KIT at 00:10

## 2019-11-02 RX ADMIN — Medication 10 ML: at 14:00

## 2019-11-02 NOTE — PROGRESS NOTES
Hospitalist Progress Note  Sanna Barboza MD  Answering service: [phone] from in house phone      Date of Service:  2019  NAME:  Marion Joseph  :  1935  MRN:  [id number]    Admission Summary:   84M hx of CKD was sent to ED by Nephrology for ARF and hyperkalemia. Interval history / Subjective:   Patient seen and examined at bedside, feels ok, choked on his breakfast/coughing some. Was eating semi recombent. K is better, GI following. Just had another loose stools. Assessment & Plan:     #. 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 #. Hyperkalemia: likely 2nd to above. - resolved. - Monitor K levels. Monitor Urine output. And renal function closely. Renal US: medical renal disease   - Check bladder scan to r/o retention, UA: proteinUria. No UTI.     #. C.diff: recurrent episodes, currently on Vancomycin po,   - GI following, consulted ID to consider Dificid vs fecal transplant. #. Deranged LFTs: RUQ US pending, monitor     #. Hx of prostate Ca: s/p prostatectomy, presumed cured  #. Hx of anal Ca: under close supervision. S/p Rx and Chemo. #. Hypothyroidism: home dose synthroid, TSH 0.7  #.  HLD: chronic, Stable, Home regimen     Patient's Baseline: ambulates with walking  Code status: Full  DVT prophylaxis: SCD  Disposition: TBD     Hospital Problems  Date Reviewed: 2017          Codes Class Noted POA    * (Principal) Hyperkalemia ICD-10-CM: E87.5  ICD-9-CM: 276.7  2019 Yes        CKD (chronic kidney disease) stage 3, GFR 30-59 ml/min (HCC) (Chronic) ICD-10-CM: N18.3  ICD-9-CM: 585.3  2017 Yes        CMML (chronic myelomonocytic leukemia) (HCC) (Chronic) ICD-10-CM: C93.10  ICD-9-CM: 205.10  6/3/2017 Yes        Hypothyroidism (Chronic) ICD-10-CM: E03.9  ICD-9-CM: 244.9  2017 Yes        Hyperlipidemia (Chronic) ICD-10-CM: E78.5  ICD-9-CM: 272.4  2017 Yes            Review of Systems:   Pertinent items are mentioned in interval history. Vital Signs:    Last 24hrs VS reviewed since prior progress note. Most recent are:  Visit Vitals  /68   Pulse 60   Temp 97.6 °F (36.4 °C)   Resp 18   SpO2 98%       No intake or output data in the 24 hours ending 11/02/19 0906     Physical Examination:   General:  Alert, oriented, No acute distress, frail WM  Card:  S1, S2 without murmurs, good peripheral perfusion  Resp:  No accessory muscle use, Good AE, no wheezes, no rhonchi  Abd:  Soft, non-tender, non-distended, slim  Extremities:  No cyanosis or clubbing, no significant edema  Neuro:  Grossly normal, no focal neuro deficits, follows commands   Psych:  Good insight, AAOx3, not agitated. Data Review:    Review and/or order of clinical lab test  Review and/or order of tests in the radiology section of CPT  Review and/or order of tests in the medicine section of CPT  Labs:     Recent Labs     11/02/19 0156 11/01/19  1222   WBC 12.4* 17.4*   HGB 8.4* 9.1*   HCT 27.4* 30.6*   PLT 59* 57*     Recent Labs     11/02/19 0156 11/01/19  1846 11/01/19  1222    143 138   K 4.8 4.7 5.4*   * 114* 113*   CO2 17* 23 21   BUN 44* 44* 42*   CREA 5.43* 5.63* 5.49*   GLU 85 126* 98   CA 9.3 9.7 10.0     Recent Labs     11/02/19  0156 11/01/19  1222   SGOT 185* 348*   ALT 98* 141*   * 237*   TBILI 0.8 0.8   TP 7.4 8.1   ALB 3.3* 3.5   GLOB 4.1* 4.6*     No results for input(s): INR, PTP, APTT, INREXT in the last 72 hours. Recent Labs     11/02/19 0156   TIBC 235*   PSAT 23   FERR 268      No results found for: FOL, RBCF   No results for input(s): PH, PCO2, PO2 in the last 72 hours.   Recent Labs     11/01/19 1846   CPK 37*     No results found for: CHOL, CHOLX, CHLST, CHOLV, HDL, HDLP, LDL, LDLC, DLDLP, TGLX, TRIGL, TRIGP, CHHD, CHHDX  Lab Results   Component Value Date/Time    Glucose (POC) 96 11/01/2019 12:23 PM    Glucose (POC) 98 06/03/2017 09:12 PM     Lab Results   Component Value Date/Time    Color YELLOW/STRAW 11/01/2019 03:15 PM    Appearance CLEAR 11/01/2019 03:15 PM    Specific gravity 1.011 11/01/2019 03:15 PM    pH (UA) 8.0 11/01/2019 03:15 PM    Protein 100 (A) 11/01/2019 03:15 PM    Glucose 100 (A) 11/01/2019 03:15 PM    Ketone NEGATIVE  11/01/2019 03:15 PM    Bilirubin NEGATIVE  11/01/2019 03:15 PM    Urobilinogen 0.2 11/01/2019 03:15 PM    Nitrites NEGATIVE  11/01/2019 03:15 PM    Leukocyte Esterase NEGATIVE  11/01/2019 03:15 PM    Epithelial cells FEW 11/01/2019 03:15 PM    Bacteria NEGATIVE  11/01/2019 03:15 PM    WBC 0-4 11/01/2019 03:15 PM    RBC 0-5 11/01/2019 03:15 PM     Medications Reviewed:     Current Facility-Administered Medications   Medication Dose Route Frequency    sodium bicarbonate (8.4%) 150 mEq in sterile water 1,000 mL infusion   IntraVENous CONTINUOUS    epoetin jaquelin-epbx (RETACRIT) injection 10,000 Units  10,000 Units SubCUTAneous EVERY WED & SAT    sodium chloride (NS) flush 5-40 mL  5-40 mL IntraVENous Q8H    sodium chloride (NS) flush 5-40 mL  5-40 mL IntraVENous PRN    HYDROcodone-acetaminophen (NORCO) 5-325 mg per tablet 1 Tab  1 Tab Oral Q4H PRN    ondansetron (ZOFRAN) injection 4 mg  4 mg IntraVENous Q4H PRN    vancomycin (FIRVANQ) 50 mg/mL oral solution 125 mg  125 mg Oral Q6H   ______________________________________________________________________  EXPECTED LENGTH OF STAY: - - -  ACTUAL LENGTH OF STAY:          1               Maryanne Mancera MD

## 2019-11-02 NOTE — PROGRESS NOTES
Bedside shift change report given to Tricia Urena RN (oncoming nurse) by Lopez Jade RN (offgoing nurse). Report included the following information SBAR, Kardex, Intake/Output, MAR and Recent Results.

## 2019-11-02 NOTE — PROGRESS NOTES
Bedside shift change report given to Patience Ashford (oncoming nurse) by Karen Gonzalez (offgoing nurse). Report included the following information SBAR.

## 2019-11-02 NOTE — CONSULTS
Infectious Disease Consult    Today's Date: 11/2/2019   Admit Date: 11/1/2019    Impression:   · History of recurrent C diff--supposed to start vancomycin taper on Monday   · Dificid is too expensive     Plan:   · Continue with planned taper of vancomycin   · OK for discharge from my standpoint, when ready    Anti-infectives:   · PO vancomycin     Subjective:   Date of Consultation:  November 2, 2019  Referring Physician: Dr Gale Tanner    Patient is a 81 y.o. male admitted for hyperkalemia and dehydration. He has a history of C diff and is S/P 2 FMT procedures and several courses of vancomycin po. Dificid has been too expensive to afford. He has been off and on antibiotic therapy for a right foot infection and has been on oral vancomycin for an extended period of time with plans to start a vancomycin taper on Monday.      Patient Active Problem List   Diagnosis Code    Neuropathy G62.9    Infection of right foot L08.9    Hypothyroidism E03.9    Hyperlipidemia E78.5    CMML (chronic myelomonocytic leukemia) ([facility]) C93.10    CKD (chronic kidney disease) stage 3, GFR 30-59 ml/min ([facility]) N18.3    Hyperkalemia E87.5     Past Medical History:   Diagnosis Date    Arthritis     Cancer ([address] 2018 (LAST ONE)    MELANOMA - SEVERAL    Cancer ([address] 2008    PROSTATE    Cancer ([address] 2015    Squamous cell carcinoma of rectum    Cancer ([address] 2005    POSSIBLE THYROID (PATIENT IS UNSURE)    Cancer ([address] 2009    CMML    Chronic kidney disease 2017    RENAL FAILURE    Chronic kidney disease 01/2019    CYST ABLATION ON KIDNEY    CKD (chronic kidney disease) stage 3, GFR 30-59 ml/min ([address] 6/5/2017    Hyperlipidemia     Ill-defined condition     CHRONIC MYELMONOCYTIC LEUKEMIA    Ill-defined condition 08/2019    RIGHT GREAT TOE BONE INFECTION  - WILL HAVE SURGERY IN OCTOBER 2019 (NOT SET YET)    Ill-defined condition 09/2019    FECAL TRANSPLANT    SCC (squamous cell carcinoma)     scalp    Thyroid disease HYPOTHYROIDISM      Family History   Problem Relation Age of Onset    Alcohol abuse Mother     Hypertension Mother     Heart Disease Mother     Cancer Father     Bleeding Prob Father         LEUKEMIA    Cancer Sister         breast    Cancer Brother         COLON    No Known Problems Sister     Anesth Problems Neg Hx       Social History     Tobacco Use    Smoking status: Never Smoker    Smokeless tobacco: Never Used   Substance Use Topics    Alcohol use: Yes     Comment: [id number]     Past Surgical History:   Procedure Laterality Date    HX ABDOMINAL WALL DEFECT REPAIR  1493    UMBILICAL    HX AMPUTATION Right 2017    baby toe on right foot    HX APPENDECTOMY  1950s     HX OTHER SURGICAL  2002    BENIGN TUMOR THYROID; parathyroid removal    HX OTHER SURGICAL      right foot surgery     HX PROSTATECTOMY  2009    HX ROTATOR CUFF REPAIR Right 2005    HX TONSILLECTOMY  CHILDHOOD      Prior to Admission medications    Medication Sig Start Date End Date Taking? Authorizing Provider   Lactobacillus acidophilus (PROBIOTIC PO) Take 2 Doses by mouth three (3) times daily. Probiotic gummies   Yes Provider, Historical   vancomycin (FIRVANQ) 50 mg/mL oral solution Take 125 mg by mouth every six (6) hours. Due to taper down on Monday 11/04   Indications: diarrhea from an infection with Clostridium difficile bacteria   Yes Provider, Historical   sodium bicarbonate 650 mg tablet Take 650 mg by mouth four (4) times daily. Yes Provider, Historical   levothyroxine (SYNTHROID) 112 mcg tablet Take 112 mcg by mouth Daily (before breakfast). Yes Provider, Historical   epoetin jaquelin (PROCRIT INJECTION) by Injection route. Every 6 weeks   Yes Provider, Historical   atorvastatin (LIPITOR) 10 mg tablet Take 10 mg by mouth daily. Yes Provider, Historical   Cholecalciferol, Vitamin D3, (VITAMIN D) 1,000 unit Cap Take  by mouth daily.    Yes Provider, Historical       No Known Allergies     Review of Systems: Gastrointestinal: having 1-2 soft BMs a day    Objective:     Visit Vitals  /66   Pulse 66   Temp 97.8 °F (36.6 °C)   Resp 19   SpO2 98%     Temp (24hrs), Av.6 °F (36.4 °C), Min:97.4 °F (36.3 °C), Max:97.9 °F (36.6 °C)       Lines:  Peripheral IV:       Physical Exam:  Lungs:  clear to auscultation bilaterally  Heart:  regular rate and rhythm  Abdomen:  soft, non-tender. Bowel sounds normal. No masses,  no organomegaly  No rash    Data Review:     CBC:  Recent Labs     19  0156 19  1222   WBC 12.4* 17.4*   GRANS  --  46   MONOS  --  36*   EOS  --  0   ANEU  --  7.9   ABL  --  2.3   HGB 8.4* 9.1*   HCT 27.4* 30.6*   PLT 59* 57*       BMP:  Recent Labs     19  0156 19  1846 19  1222   CREA 5.43* 5.63* 5.49*   BUN 44* 44* 42*    143 138   K 4.8 4.7 5.4*   * 114* 113*   CO2 17* 23 21   AGAP 8 6 4*   GLU 85 126* 98       LFTS:  Recent Labs     19  0156 19  1222   TBILI 0.8 0.8   ALT 98* 141*   SGOT 185* 348*   * 237*   TP 7.4 8.1   ALB 3.3* 3.5       Microbiology:     All Micro Results     Procedure Component Value Units Date/Time    URINE CULTURE HOLD SAMPLE [[id number]] Collected:  19 1515    Order Status:  Completed Specimen:  Urine from Serum Updated:  19 1522     Urine culture hold       URINE ON HOLD IN MICROBIOLOGY DEPT FOR 3 DAYS. IF UNPRESERVED URINE IS SUBMITTED, IT CANNOT BE USED FOR ADDITIONAL TESTING AFTER 24 HRS, RECOLLECTION WILL BE REQUIRED.                 Imaging:   Reviewed     Signed By: Bharati Tate MD     2019

## 2019-11-02 NOTE — PROGRESS NOTES
[address]  [address] [facility] [address], [address]  [phone]                GI PROGRESS NOTE      NAME:   Melissa Mederos   :    1935   MRN:    [id number]     Subjective:       WBC 17-->12.  --> 98. -->185. Alk phos 237-->197. T bili 0.8. Pt feels well he states. 2 soft BMs yesterday, one so far today. Objective:     VITALS:   Last 24hrs VS reviewed since prior hospitalist progress note. Most recent are:  Visit Vitals  /68   Pulse 60   Temp 97.6 °F (36.4 °C)   Resp 18   SpO2 98%     No intake or output data in the 24 hours ending 19 0953     PHYSICAL EXAM:  General   well developed, thin pale mal  Neck   Supple without nodes or mass. No thyromegaly or bruit  Respiratory   Clear To Auscultation bilaterally - no wheezes, rales, rhonchi, or crackles  Cardiology  Regular Rate and Rythmn  - no murmurs, rubs or gallops  Abdominal  Soft, non-tender, non-distended  Extremities  No clubbing, cyanosis, or edema. Pulses intact. Back  No spinal or muscle pain. No CVAT. Skin  Normal skin turgor.   No rashes or skin ulcers noted       Lab Data   Recent Results (from the past 12 hour(s))   METABOLIC PANEL, BASIC    Collection Time: 19  1:56 AM   Result Value Ref Range    Sodium 140 136 - 145 mmol/L    Potassium 4.8 3.5 - 5.1 mmol/L    Chloride 115 (H) 97 - 108 mmol/L    CO2 17 (L) 21 - 32 mmol/L    Anion gap 8 5 - 15 mmol/L    Glucose 85 65 - 100 mg/dL    BUN 44 (H) 6 - 20 MG/DL    Creatinine 5.43 (H) 0.70 - 1.30 MG/DL    BUN/Creatinine ratio 8 (L) 12 - 20      GFR est AA 12 (L) >60 ml/min/1.73m2    GFR est non-AA 10 (L) >60 ml/min/1.73m2    Calcium 9.3 8.5 - 10.1 MG/DL   CBC W/O DIFF    Collection Time: 19  1:56 AM   Result Value Ref Range    WBC 12.4 (H) 4.1 - 11.1 K/uL    RBC 2.77 (L) 4.10 - 5.70 M/uL    HGB 8.4 (L) 12.1 - 17.0 g/dL    HCT 27.4 (L) 36.6 - 50.3 %    MCV 98.9 80.0 - 99.0 FL    MCH 30.3 26.0 - 34.0 PG    MCHC 30.7 30.0 - 36.5 g/dL    RDW 16.5 (H) 11.5 - 14.5 %    PLATELET 59 (L) 943 - 400 K/uL    MPV 12.4 8.9 - 12.9 FL    NRBC 0.0 0  WBC    ABSOLUTE NRBC 0.00 0.00 - 0.01 K/uL   HEPATIC FUNCTION PANEL    Collection Time: 11/02/19  1:56 AM   Result Value Ref Range    Protein, total 7.4 6.4 - 8.2 g/dL    Albumin 3.3 (L) 3.5 - 5.0 g/dL    Globulin 4.1 (H) 2.0 - 4.0 g/dL    A-G Ratio 0.8 (L) 1.1 - 2.2      Bilirubin, total 0.8 0.2 - 1.0 MG/DL    Bilirubin, direct 0.2 0.0 - 0.2 MG/DL    Alk. phosphatase 197 (H) 45 - 117 U/L    AST (SGOT) 185 (H) 15 - 37 U/L    ALT (SGPT) 98 (H) 12 - 78 U/L   TSH 3RD GENERATION    Collection Time: 11/02/19  1:56 AM   Result Value Ref Range    TSH 0.71 0.36 - 3.74 uIU/mL   IRON PROFILE    Collection Time: 11/02/19  1:56 AM   Result Value Ref Range    Iron 53 35 - 150 ug/dL    TIBC 235 (L) 250 - 450 ug/dL    Iron % saturation 23 20 - 50 %   FERRITIN    Collection Time: 11/02/19  1:56 AM   Result Value Ref Range    Ferritin 268 26 - 388 NG/ML       Medications: Reviewed    abd US     1. Mild heterogeneity of liver with mild lobularity of capsular configuration  but no focal abnormality. Correlate for chronic liver disease. 2. Splenomegaly. 3. Cholelithiasis without biliary ductal dilatation. 4. Increased echogenicity of kidneys, as previously noted, consistent with  medical renal parenchymal disease. Correlate with renal function parameters. No  hydronephrosis. Assessment/Plan     Marbella Tse is a 81 y.o.  male with CKD who was admitted with diarrhea 2/2 C diff. Per prior history \"Patient had C. Diff in 2017 and failed flagyl and PO Vanc. He then underwent two fecal transplants via dobhoff because the first was unsuccessful. He then had eighteen months without infection. In September 2019, patient developed toe infection and was given 7 day course of antibiotics which caused him to develop C. Diff infection.   He was restarted on PO Vancomycin and has been on 4x daily dosing since his toe infection, approximately 2 months. Patient is followed closely by PCP for his C. Diff. Infection, he was seen by Dr. Albina Ochoa with initial episodes c. diff. \"    - Continue oral vancomycin. Patient is clinically improving it seems.   - ID consult pending re consideration of dificid  - No acute indication for fecal transplant  - Trend daily LFTs - likely 2/2 ischemia.  Will check acute hep panel    Attending Physician: Tyson Owens MD   Date/Time:  11/2/2019

## 2019-11-02 NOTE — PROGRESS NOTES
[facility] Progress note      I\"m feeling better-I do a good job of lying in the bed    . Ultrasound of his kidneys were done, which showed right kidney 7.6 cm, left kidney 9 cm. Simple renal cyst, no hydronephrosis. PAST MEDICAL HISTORY:  Includes  1. 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  2.  Hypertension. 3.  CML. 4.  Anal cancer. 5.  Prostate cancer. 6.  Hypothyroidism. 7.  Foot surgery. 8.  Prostatectomy. MEDICATIONS: Reviewed    ALLERGIES:  NO KNOWN ALLERGY. SOCIAL HISTORY:  Reviewed. FAMILY HISTORY:  Reviewed. REVIEW OF SYSTEMS:  Look at the HPI, rest is negative. PHYSICAL EXAMINATION:  GENERAL:  Not in acute distress. VSS  NECK:  JVD is negative. LUNGS:  Clear. CV: RRR  ABDOMEN:  Slightly tender. EXTREMITIES:  Right foot wrapped. Left foot, no edema. LABORATORY DATA:  Creatinine improved a little to 5.4-not hyperkalemic, but acidotic    ASSESSMENT:  1. Chronic kidney disease stage V.  2.  Acute kidney injury. 3.  Edema. 4.  Diarrhea. He was told he has Clostridium difficile. 5.  Mild hyperkalemia. 6.  Chronic myelogenous leukemia. 7.  Anal cancer. RECOMMENDATION:  As follows,  1. It is of note the patient has slightly asymmetric kidney. 2.  IV fluid--change to bicarb  3.  WIll check bone metabolism labs/uric acid/25 vit d / pth  4. Treatment for his diarrhea/C. diff. 5.  We will follow, but hopefully avoid dialysis this admission. 6.  CHeck iron panels, retacrit  7.   Pt follows in office with Dr. Arun Barron --little knowledge of dialysis but states \"it has been mentioned\"      Leticia Souza MD

## 2019-11-03 LAB
25(OH)D3 SERPL-MCNC: 45.4 NG/ML (ref 30–100)
ALBUMIN SERPL-MCNC: 3.1 G/DL (ref 3.5–5)
ALBUMIN/GLOB SERPL: 0.8 {RATIO} (ref 1.1–2.2)
ALP SERPL-CCNC: 153 U/L (ref 45–117)
ALT SERPL-CCNC: 58 U/L (ref 12–78)
ANION GAP SERPL CALC-SCNC: 7 MMOL/L (ref 5–15)
AST SERPL-CCNC: 80 U/L (ref 15–37)
ATRIAL RATE: 83 BPM
BILIRUB SERPL-MCNC: 0.6 MG/DL (ref 0.2–1)
BUN SERPL-MCNC: 39 MG/DL (ref 6–20)
BUN/CREAT SERPL: 8 (ref 12–20)
CALCIUM SERPL-MCNC: 9.5 MG/DL (ref 8.5–10.1)
CALCIUM SERPL-MCNC: 9.5 MG/DL (ref 8.5–10.1)
CALCULATED R AXIS, ECG10: -52 DEGREES
CALCULATED T AXIS, ECG11: 77 DEGREES
CHLORIDE SERPL-SCNC: 112 MMOL/L (ref 97–108)
CO2 SERPL-SCNC: 22 MMOL/L (ref 21–32)
CREAT SERPL-MCNC: 4.99 MG/DL (ref 0.7–1.3)
DIAGNOSIS, 93000: NORMAL
ERYTHROCYTE [DISTWIDTH] IN BLOOD BY AUTOMATED COUNT: 16.4 % (ref 11.5–14.5)
GLOBULIN SER CALC-MCNC: 4 G/DL (ref 2–4)
GLUCOSE SERPL-MCNC: 81 MG/DL (ref 65–100)
HAV IGM SER QL: NONREACTIVE
HBV CORE IGM SER QL: NONREACTIVE
HBV SURFACE AG SER QL: <0.1 INDEX
HBV SURFACE AG SER QL: NEGATIVE
HCT VFR BLD AUTO: 27.2 % (ref 36.6–50.3)
HCV AB SERPL QL IA: NONREACTIVE
HCV COMMENT,HCGAC: NORMAL
HGB BLD-MCNC: 8.4 G/DL (ref 12.1–17)
MAGNESIUM SERPL-MCNC: 1.8 MG/DL (ref 1.6–2.4)
MCH RBC QN AUTO: 29.9 PG (ref 26–34)
MCHC RBC AUTO-ENTMCNC: 30.9 G/DL (ref 30–36.5)
MCV RBC AUTO: 96.8 FL (ref 80–99)
NRBC # BLD: 0 K/UL (ref 0–0.01)
NRBC BLD-RTO: 0 PER 100 WBC
P-R INTERVAL, ECG05: 262 MS
PHOSPHATE SERPL-MCNC: 3.6 MG/DL (ref 2.6–4.7)
PLATELET # BLD AUTO: 65 K/UL (ref 150–400)
PMV BLD AUTO: 11.8 FL (ref 8.9–12.9)
POTASSIUM SERPL-SCNC: 4.2 MMOL/L (ref 3.5–5.1)
PROT SERPL-MCNC: 7.1 G/DL (ref 6.4–8.2)
PTH-INTACT SERPL-MCNC: 181.8 PG/ML (ref 18.4–88)
Q-T INTERVAL, ECG07: 370 MS
QRS DURATION, ECG06: 136 MS
QTC CALCULATION (BEZET), ECG08: 434 MS
RBC # BLD AUTO: 2.81 M/UL (ref 4.1–5.7)
SODIUM SERPL-SCNC: 141 MMOL/L (ref 136–145)
SP1: NORMAL
SP2: NORMAL
SP3: NORMAL
URATE SERPL-MCNC: 10.2 MG/DL (ref 3.5–7.2)
VENTRICULAR RATE, ECG03: 83 BPM
WBC # BLD AUTO: 9.4 K/UL (ref 4.1–11.1)

## 2019-11-03 PROCEDURE — 80053 COMPREHEN METABOLIC PANEL: CPT

## 2019-11-03 PROCEDURE — 85027 COMPLETE CBC AUTOMATED: CPT

## 2019-11-03 PROCEDURE — 74011250637 HC RX REV CODE- 250/637: Performed by: NURSE PRACTITIONER

## 2019-11-03 PROCEDURE — 74011000250 HC RX REV CODE- 250: Performed by: INTERNAL MEDICINE

## 2019-11-03 PROCEDURE — 84550 ASSAY OF BLOOD/URIC ACID: CPT

## 2019-11-03 PROCEDURE — 83970 ASSAY OF PARATHORMONE: CPT

## 2019-11-03 PROCEDURE — 82306 VITAMIN D 25 HYDROXY: CPT

## 2019-11-03 PROCEDURE — 36415 COLL VENOUS BLD VENIPUNCTURE: CPT

## 2019-11-03 PROCEDURE — 74011250636 HC RX REV CODE- 250/636: Performed by: INTERNAL MEDICINE

## 2019-11-03 PROCEDURE — 84100 ASSAY OF PHOSPHORUS: CPT

## 2019-11-03 PROCEDURE — 80074 ACUTE HEPATITIS PANEL: CPT

## 2019-11-03 PROCEDURE — 65270000032 HC RM SEMIPRIVATE

## 2019-11-03 PROCEDURE — 83735 ASSAY OF MAGNESIUM: CPT

## 2019-11-03 RX ORDER — LEVOTHYROXINE SODIUM 112 UG/1
112 TABLET ORAL
Status: DISCONTINUED | OUTPATIENT
Start: 2019-11-04 | End: 2019-11-04 | Stop reason: HOSPADM

## 2019-11-03 RX ADMIN — VANCOMYCIN HYDROCHLORIDE 125 MG: KIT at 06:17

## 2019-11-03 RX ADMIN — SODIUM BICARBONATE: 84 INJECTION, SOLUTION INTRAVENOUS at 17:20

## 2019-11-03 RX ADMIN — VANCOMYCIN HYDROCHLORIDE 125 MG: KIT at 11:04

## 2019-11-03 RX ADMIN — VANCOMYCIN HYDROCHLORIDE 125 MG: KIT at 00:16

## 2019-11-03 RX ADMIN — Medication 10 ML: at 17:20

## 2019-11-03 RX ADMIN — SODIUM BICARBONATE: 84 INJECTION, SOLUTION INTRAVENOUS at 11:05

## 2019-11-03 RX ADMIN — VANCOMYCIN HYDROCHLORIDE 125 MG: KIT at 17:27

## 2019-11-03 RX ADMIN — ONDANSETRON 4 MG: 2 INJECTION INTRAMUSCULAR; INTRAVENOUS at 12:23

## 2019-11-03 NOTE — PROGRESS NOTES
12:30 pm: Patient had a small amount amount of emesis this morning. Patient stated he ate eggs this morning. RN gave Zofran. Will continue to monitor. 1:48 pm: 50 mL of emesis, is yellow.

## 2019-11-03 NOTE — DISCHARGE INSTRUCTIONS
Discharge Instructions       PATIENT ID: Damien Braswell  MRN: [id number]   YOB: 1935    DATE OF ADMISSION: 11/1/2019 10:50 AM    DATE OF DISCHARGE: 11/3/2019    PRIMARY CARE PROVIDER: Jie Grace MD     ATTENDING PHYSICIAN: Jossue Paul MD  DISCHARGING PROVIDER: Irene Welch MD    To contact this individual call [phone] and ask the  to page. If unavailable ask to be transferred the Adult Hospitalist Department. DISCHARGE DIAGNOSES C.diff, hyperKalemia/ CKD    CONSULTATIONS: IP CONSULT TO NEPHROLOGY  IP CONSULT TO GASTROENTEROLOGY  IP CONSULT TO INFECTIOUS DISEASES  IP CONSULT TO HOSPITALIST    PROCEDURES/SURGERIES: * No surgery found *    FOLLOW UP APPOINTMENTS:   Follow-up Information    None          ADDITIONAL CARE RECOMMENDATIONS: follow up with PCP, Nephrology    DIET: Resume previous diet    DISCHARGE MEDICATIONS:      · It is important that you take the medication exactly as they are prescribed. · Keep your medication in the bottles provided by the pharmacist and keep a list of the medication names, dosages, and times to be taken in your wallet. · Do not take other medications without consulting your doctor. NOTIFY YOUR PHYSICIAN FOR ANY OF THE FOLLOWING:   Fever over 101 degrees for 24 hours. Chest pain, shortness of breath, fever, chills, nausea, vomiting, diarrhea, change in mentation, falling, weakness, bleeding. Severe pain or pain not relieved by medications. Or, any other signs or symptoms that you may have questions about. It was our pleasure to help take care of you and we hope you get well very soon.     DISPOSITION:    Home With:   OT  PT  HH  RN       SNF/Inpatient Rehab/LTAC   x Independent/assisted living    Hospice    Other:     CDMP Checked:   Yes x     PROBLEM LIST Updated:  Yes x     Signed:   Irene Welch MD  11/3/2019  1:48 PM

## 2019-11-03 NOTE — PROGRESS NOTES
Bedside shift change report given to Jessie Velazquez RN (oncoming nurse) by Harmony Costa RN (offgoing nurse). Report included the following information SBAR, Kardex, Intake/Output, MAR and Recent Results. Daylight saving time occurred at 0200. Charting accommodated to reflect change in time.

## 2019-11-03 NOTE — PROGRESS NOTES
[address]  [address] [facility], [address]  [phone]                GI PROGRESS NOTE      NAME:   Nancy Castle   :    1935   MRN:    [id number]     Subjective:     Pt had one BM yesterday. He ate dark chocolate last night and had 3 soft BMs overnight. Objective:     VITALS:   Last 24hrs VS reviewed since prior hospitalist progress note. Most recent are:  Visit Vitals  /66   Pulse 66   Temp 98.6 °F (37 °C)   Resp 18   SpO2 99%       Intake/Output Summary (Last 24 hours) at 11/3/2019 1322  Last data filed at 11/3/2019 1103  Gross per 24 hour   Intake    Output 1800 ml   Net -1800 ml        PHYSICAL EXAM:  General   well developed, thin pale mal  Neck   Supple without nodes or mass. No thyromegaly or bruit  Full exam deferred as pt on commode    Lab Data   Recent Results (from the past 12 hour(s))   VITAMIN D, 25 HYDROXY    Collection Time: 19  4:28 AM   Result Value Ref Range    Vitamin D 25-Hydroxy 45.4 30 - 100 ng/mL   URIC ACID    Collection Time: 19  4:28 AM   Result Value Ref Range    Uric acid 10.2 (H) 3.5 - 7.2 MG/DL   METABOLIC PANEL, COMPREHENSIVE    Collection Time: 19  4:28 AM   Result Value Ref Range    Sodium 141 136 - 145 mmol/L    Potassium 4.2 3.5 - 5.1 mmol/L    Chloride 112 (H) 97 - 108 mmol/L    CO2 22 21 - 32 mmol/L    Anion gap 7 5 - 15 mmol/L    Glucose 81 65 - 100 mg/dL    BUN 39 (H) 6 - 20 MG/DL    Creatinine 4.99 (H) 0.70 - 1.30 MG/DL    BUN/Creatinine ratio 8 (L) 12 - 20      GFR est AA 14 (L) >60 ml/min/1.73m2    GFR est non-AA 11 (L) >60 ml/min/1.73m2    Calcium 9.5 8.5 - 10.1 MG/DL    Bilirubin, total 0.6 0.2 - 1.0 MG/DL    ALT (SGPT) 58 12 - 78 U/L    AST (SGOT) 80 (H) 15 - 37 U/L    Alk.  phosphatase 153 (H) 45 - 117 U/L    Protein, total 7.1 6.4 - 8.2 g/dL    Albumin 3.1 (L) 3.5 - 5.0 g/dL    Globulin 4.0 2.0 - 4.0 g/dL    A-G Ratio 0.8 (L) 1.1 - 2.2     PHOSPHORUS    Collection Time: 19  4:28 AM Result Value Ref Range    Phosphorus 3.6 2.6 - 4.7 MG/DL   MAGNESIUM    Collection Time: 11/03/19  4:28 AM   Result Value Ref Range    Magnesium 1.8 1.6 - 2.4 mg/dL   CBC W/O DIFF    Collection Time: 11/03/19  4:28 AM   Result Value Ref Range    WBC 9.4 4.1 - 11.1 K/uL    RBC 2.81 (L) 4.10 - 5.70 M/uL    HGB 8.4 (L) 12.1 - 17.0 g/dL    HCT 27.2 (L) 36.6 - 50.3 %    MCV 96.8 80.0 - 99.0 FL    MCH 29.9 26.0 - 34.0 PG    MCHC 30.9 30.0 - 36.5 g/dL    RDW 16.4 (H) 11.5 - 14.5 %    PLATELET 65 (L) 208 - 400 K/uL    MPV 11.8 8.9 - 12.9 FL    NRBC 0.0 0  WBC    ABSOLUTE NRBC 0.00 0.00 - 0.01 K/uL   HEPATITIS PANEL, ACUTE    Collection Time: 11/03/19  4:28 AM   Result Value Ref Range    Hepatitis A, IgM NONREACTIVE NR      __          Hepatitis B surface Ag <0.10 Index    Hep B surface Ag Interp. NEGATIVE  NEG      __          Hepatitis B core, IgM NONREACTIVE NR      __          Hep C  virus Ab Interp. NONREACTIVE NR      Hep C  virus Ab comment Method used is Siemens Advia Centaur         Medications: Reviewed    abd US     1. Mild heterogeneity of liver with mild lobularity of capsular configuration  but no focal abnormality. Correlate for chronic liver disease. 2. Splenomegaly. 3. Cholelithiasis without biliary ductal dilatation. 4. Increased echogenicity of kidneys, as previously noted, consistent with  medical renal parenchymal disease. Correlate with renal function parameters. No  hydronephrosis. Assessment/Plan     Leland Friedman is a 81 y.o.  male with CKD who was admitted with diarrhea 2/2 C diff. Per prior history \"Patient had C. Diff in 2017 and failed flagyl and PO Vanc. He then underwent two fecal transplants via dobhoff because the first was unsuccessful. He then had eighteen months without infection. In September 2019, patient developed toe infection and was given 7 day course of antibiotics which caused him to develop C. Diff infection.   He was restarted on PO Vancomycin and has been on 4x daily dosing since his toe infection, approximately 2 months. Patient is followed closely by PCP for his C. Diff. Infection, he was seen by Dr. Trinidad Tucker with initial episodes c. diff. \"    - Continue oral vancomycin. Patient is clinically improving it seems. Dificid too expensive.   - No acute indication for fecal transplant  - Elevated LFTs 2/2 ischemia. Acute hep panel neg    Attending Physician: William Cheng MD   Date/Time:  11/3/2019

## 2019-11-03 NOTE — PROGRESS NOTES
Hospitalist Progress Note  Irene Welch MD  Answering service: [phone] from in house phone      Date of Service:  11/3/2019  NAME:  Damien Braswell  :  1935  MRN:  [id number]    Admission Summary:   84M hx of CKD was sent to ED by Nephrology for ARF and hyperkalemia. Interval history / Subjective:   Patient seen and examined at bedside, feels ok, had 3 BMs last night, but had some chocolate that wife also had who also had diarrhea. Overall thinks slowly improving. If nephrology of for dc may dc today. Assessment & Plan:     #. 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 #. Hyperkalemia: likely 2nd to above. - resolved. - Monitor K levels. Monitor Urine output. And renal function closely. Renal US: medical renal disease   - Check bladder scan to r/o retention, UA: proteinUria. No UTI.     #. C.diff: recurrent episodes, currently on Vancomycin po,   - GI following: fecal transplant not planned. ID evaluated: Deficid too expensive, ctn vanc    #. Deranged LFTs: - improving, possible hypotension induced. RUQ US: heterogenous liver, monitor     #. Hx of prostate Ca: s/p prostatectomy, presumed cured  #. Hx of anal Ca: under close supervision. S/p Rx and Chemo. #. Hypothyroidism: home dose synthroid, TSH 0.7  #.  HLD: chronic, Stable, Home regimen     Patient's Baseline: ambulates with walking  Code status: Full  DVT prophylaxis: SCD  Disposition: TBD     Hospital Problems  Date Reviewed: 2017          Codes Class Noted POA    * (Principal) Hyperkalemia ICD-10-CM: E87.5  ICD-9-CM: 276.7  2019 Yes        CKD (chronic kidney disease) stage 3, GFR 30-59 ml/min (HCC) (Chronic) ICD-10-CM: N18.3  ICD-9-CM: 585.3  2017 Yes        CMML (chronic myelomonocytic leukemia) (HCC) (Chronic) ICD-10-CM: C93.10  ICD-9-CM: 205.10  6/3/2017 Yes        Hypothyroidism (Chronic) ICD-10-CM: E03.9  ICD-9-CM: 244.9 5/31/2017 Yes        Hyperlipidemia (Chronic) ICD-10-CM: E78.5  ICD-9-CM: 272.4  5/31/2017 Yes            Review of Systems:   Pertinent items are mentioned in interval history. Vital Signs:    Last 24hrs VS reviewed since prior progress note. Most recent are:  Visit Vitals  /66   Pulse 66   Temp 98.6 °F (37 °C)   Resp 18   SpO2 99%         Intake/Output Summary (Last 24 hours) at 11/3/2019 0828  Last data filed at 11/3/2019 0745  Gross per 24 hour   Intake    Output 1600 ml   Net -1600 ml        Physical Examination:   General:  Alert, oriented, No acute distress, frail WM  Card:  S1, S2 without murmurs, good peripheral perfusion  Resp:  No accessory muscle use, Good AE, no wheezes, no rhonchi  Abd:  Soft, non-tender, non-distended, slim  Extremities:  No cyanosis or clubbing, no significant edema  Neuro:  Grossly normal, no focal neuro deficits, follows commands   Psych:  Good insight, AAOx3, not agitated. Data Review:    Review and/or order of clinical lab test  Review and/or order of tests in the radiology section of CPT  Review and/or order of tests in the medicine section of CPT  Labs:     Recent Labs     11/03/19 0428 11/02/19 0156   WBC 9.4 12.4*   HGB 8.4* 8.4*   HCT 27.2* 27.4*   PLT 65* 59*     Recent Labs     11/03/19 0428 11/02/19 0156 11/01/19  1846    140 143   K 4.2 4.8 4.7   * 115* 114*   CO2 22 17* 23   BUN 39* 44* 44*   CREA 4.99* 5.43* 5.63*   GLU 81 85 126*   CA 9.5 9.3 9.7   MG 1.8  --   --    PHOS 3.6  --   --    URICA 10.2*  --   --      Recent Labs     11/03/19 0428 11/02/19 0156 11/01/19  1222   SGOT 80* 185* 348*   ALT 58 98* 141*   * 197* 237*   TBILI 0.6 0.8 0.8   TP 7.1 7.4 8.1   ALB 3.1* 3.3* 3.5   GLOB 4.0 4.1* 4.6*     No results for input(s): INR, PTP, APTT, INREXT, INREXT in the last 72 hours.    Recent Labs     11/02/19  0156   TIBC 235*   PSAT 23   FERR 268      No results found for: FOL, RBCF   No results for input(s): PH, PCO2, PO2 in the last 72 hours.   Recent Labs     11/01/19  1846   CPK 37*     No results found for: CHOL, CHOLX, CHLST, CHOLV, HDL, HDLP, LDL, LDLC, DLDLP, TGLX, TRIGL, TRIGP, CHHD, CHHDX  Lab Results   Component Value Date/Time    Glucose (POC) 96 11/01/2019 12:23 PM    Glucose (POC) 98 06/03/2017 09:12 PM     Lab Results   Component Value Date/Time    Color YELLOW/STRAW 11/01/2019 03:15 PM    Appearance CLEAR 11/01/2019 03:15 PM    Specific gravity 1.011 11/01/2019 03:15 PM    pH (UA) 8.0 11/01/2019 03:15 PM    Protein 100 (A) 11/01/2019 03:15 PM    Glucose 100 (A) 11/01/2019 03:15 PM    Ketone NEGATIVE  11/01/2019 03:15 PM    Bilirubin NEGATIVE  11/01/2019 03:15 PM    Urobilinogen 0.2 11/01/2019 03:15 PM    Nitrites NEGATIVE  11/01/2019 03:15 PM    Leukocyte Esterase NEGATIVE  11/01/2019 03:15 PM    Epithelial cells FEW 11/01/2019 03:15 PM    Bacteria NEGATIVE  11/01/2019 03:15 PM    WBC 0-4 11/01/2019 03:15 PM    RBC 0-5 11/01/2019 03:15 PM     Medications Reviewed:     Current Facility-Administered Medications   Medication Dose Route Frequency    sodium bicarbonate (8.4%) 150 mEq in sterile water 1,000 mL infusion   IntraVENous CONTINUOUS    epoetin jaquelin-epbx (RETACRIT) injection 10,000 Units  10,000 Units SubCUTAneous EVERY WED & SAT    sodium chloride (NS) flush 5-40 mL  5-40 mL IntraVENous Q8H    sodium chloride (NS) flush 5-40 mL  5-40 mL IntraVENous PRN    HYDROcodone-acetaminophen (NORCO) 5-325 mg per tablet 1 Tab  1 Tab Oral Q4H PRN    ondansetron (ZOFRAN) injection 4 mg  4 mg IntraVENous Q4H PRN    vancomycin (FIRVANQ) 50 mg/mL oral solution 125 mg  125 mg Oral Q6H   ______________________________________________________________________  EXPECTED LENGTH OF STAY: - - -  ACTUAL LENGTH OF STAY:          2               Nereyda Mcgee MD

## 2019-11-03 NOTE — ROUTINE PROCESS
Bedside and Verbal shift change report given to PAUL Orr (oncoming nurse) by Lory Lee (offgoing nurse). Report included the following information SBAR, Kardex and Recent Results.

## 2019-11-03 NOTE — ROUTINE PROCESS
Bedside and Verbal shift change report given to Deshawn Mahmood (oncoming nurse) by Lorrene Ganser (offgoing nurse). Report included the following information SBAR, Kardex and Recent Results.

## 2019-11-03 NOTE — PROGRESS NOTES
[facility] Progress note      Still some diarrhea and some vomiting. Non-oliguric. His wife was present. We discussed the above. hSaron Cooper PAST MEDICAL HISTORY:  Includes  1. 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  2.  Hypertension. 3.  CML. 4.  Anal cancer. 5.  Prostate cancer. 6.  Hypothyroidism. 7.  Foot surgery. 8.  Prostatectomy. MEDICATIONS: Reviewed    ALLERGIES:  NO KNOWN ALLERGY. SOCIAL HISTORY:  Reviewed. FAMILY HISTORY:  Reviewed. REVIEW OF SYSTEMS:  Look at the HPI, rest is negative. PHYSICAL EXAMINATION:  GENERAL:  Not in acute distress. VSS  NECK:  JVD is negative. LUNGS:  Clear. CV: RRR  ABDOMEN:  Slightly tender. EXTREMITIES:  Right foot wrapped. Left foot, no edema. LABORATORY DATA:  reviewed    ASSESSMENT:  1. Chronic kidney disease stage V. Creatinine better today (5.4 to 5)  2. Acute kidney injury. 3.  Vomiting   4. Diarrhea. He was told he has Clostridium difficile. 5.  Mild hyperkalemia. 6.  Chronic myelogenous leukemia. 7.  Anal cancer. RECOMMENDATION:  As follows,  1. No acute need for KRT, if vomiting continues, then probably represents uremia. 2.  Continue bicarb gtt - increase rate  3. Pending pth  4. Treatment for his diarrhea/C. diff. 5.  We will follow, but hopefully avoid dialysis this admission. 6.  Iron adequate, on retacrit  7.   Pt follows in office with Dr. Janice Le MD

## 2019-11-04 VITALS
HEART RATE: 61 BPM | TEMPERATURE: 97.9 F | SYSTOLIC BLOOD PRESSURE: 125 MMHG | DIASTOLIC BLOOD PRESSURE: 67 MMHG | RESPIRATION RATE: 18 BRPM | OXYGEN SATURATION: 98 %

## 2019-11-04 LAB
ANION GAP SERPL CALC-SCNC: 5 MMOL/L (ref 5–15)
BUN SERPL-MCNC: 36 MG/DL (ref 6–20)
BUN/CREAT SERPL: 7 (ref 12–20)
CALCIUM SERPL-MCNC: 9.2 MG/DL (ref 8.5–10.1)
CHLORIDE SERPL-SCNC: 108 MMOL/L (ref 97–108)
CO2 SERPL-SCNC: 26 MMOL/L (ref 21–32)
CREAT SERPL-MCNC: 4.98 MG/DL (ref 0.7–1.3)
ERYTHROCYTE [DISTWIDTH] IN BLOOD BY AUTOMATED COUNT: 16.6 % (ref 11.5–14.5)
GLUCOSE SERPL-MCNC: 82 MG/DL (ref 65–100)
HCT VFR BLD AUTO: 29.1 % (ref 36.6–50.3)
HGB BLD-MCNC: 9.1 G/DL (ref 12.1–17)
MCH RBC QN AUTO: 29.6 PG (ref 26–34)
MCHC RBC AUTO-ENTMCNC: 31.3 G/DL (ref 30–36.5)
MCV RBC AUTO: 94.8 FL (ref 80–99)
NRBC # BLD: 0 K/UL (ref 0–0.01)
NRBC BLD-RTO: 0 PER 100 WBC
PLATELET # BLD AUTO: 79 K/UL (ref 150–400)
PMV BLD AUTO: 12.3 FL (ref 8.9–12.9)
POTASSIUM SERPL-SCNC: 4.3 MMOL/L (ref 3.5–5.1)
RBC # BLD AUTO: 3.07 M/UL (ref 4.1–5.7)
SODIUM SERPL-SCNC: 139 MMOL/L (ref 136–145)
WBC # BLD AUTO: 13 K/UL (ref 4.1–11.1)

## 2019-11-04 PROCEDURE — 36415 COLL VENOUS BLD VENIPUNCTURE: CPT

## 2019-11-04 PROCEDURE — 74011000250 HC RX REV CODE- 250: Performed by: INTERNAL MEDICINE

## 2019-11-04 PROCEDURE — 74011250636 HC RX REV CODE- 250/636: Performed by: INTERNAL MEDICINE

## 2019-11-04 PROCEDURE — 74011250637 HC RX REV CODE- 250/637: Performed by: NURSE PRACTITIONER

## 2019-11-04 PROCEDURE — 85027 COMPLETE CBC AUTOMATED: CPT

## 2019-11-04 PROCEDURE — 74011250637 HC RX REV CODE- 250/637: Performed by: INTERNAL MEDICINE

## 2019-11-04 PROCEDURE — 80048 BASIC METABOLIC PNL TOTAL CA: CPT

## 2019-11-04 RX ADMIN — LEVOTHYROXINE SODIUM 112 MCG: 112 TABLET ORAL at 06:52

## 2019-11-04 RX ADMIN — VANCOMYCIN HYDROCHLORIDE 125 MG: KIT at 06:50

## 2019-11-04 RX ADMIN — Medication 1 CAPSULE: at 10:28

## 2019-11-04 RX ADMIN — SODIUM BICARBONATE: 84 INJECTION, SOLUTION INTRAVENOUS at 07:07

## 2019-11-04 RX ADMIN — VANCOMYCIN HYDROCHLORIDE 125 MG: KIT at 12:31

## 2019-11-04 RX ADMIN — VANCOMYCIN HYDROCHLORIDE 125 MG: KIT at 00:24

## 2019-11-04 NOTE — PROGRESS NOTES
[address]  [address] [facility] [address] [facility], [address]  [phone]        GI PROGRESS NOTE      NAME:   Leonila Yung   :    1935   MRN:    [id number]     Subjective:     Pt had one BM yesterday-formed. Objective:     VITALS:   Last 24hrs VS reviewed since prior hospitalist progress note. Most recent are:  Visit Vitals  /68 (BP 1 Location: Left arm, BP Patient Position: At rest)   Pulse 71   Temp 97.4 °F (36.3 °C)   Resp 18   SpO2 100%       Intake/Output Summary (Last 24 hours) at 2019 0748  Last data filed at 2019 0533  Gross per 24 hour   Intake 180 ml   Output 1950 ml   Net -1770 ml        PHYSICAL EXAM:  General   well developed, thin pale mal  Neck   Supple without nodes or mass.  No thyromegaly or bruit  Abd: normoactive BS, nt, nd, no rebound/guarding    Lab Data   Recent Results (from the past 12 hour(s))   CBC W/O DIFF    Collection Time: 19  1:47 AM   Result Value Ref Range    WBC 13.0 (H) 4.1 - 11.1 K/uL    RBC 3.07 (L) 4.10 - 5.70 M/uL    HGB 9.1 (L) 12.1 - 17.0 g/dL    HCT 29.1 (L) 36.6 - 50.3 %    MCV 94.8 80.0 - 99.0 FL    MCH 29.6 26.0 - 34.0 PG    MCHC 31.3 30.0 - 36.5 g/dL    RDW 16.6 (H) 11.5 - 14.5 %    PLATELET 79 (L) 167 - 400 K/uL    MPV 12.3 8.9 - 12.9 FL    NRBC 0.0 0  WBC    ABSOLUTE NRBC 0.00 0.00 - 4.57 K/uL   METABOLIC PANEL, BASIC    Collection Time: 19  1:47 AM   Result Value Ref Range    Sodium 139 136 - 145 mmol/L    Potassium 4.3 3.5 - 5.1 mmol/L    Chloride 108 97 - 108 mmol/L    CO2 26 21 - 32 mmol/L    Anion gap 5 5 - 15 mmol/L    Glucose 82 65 - 100 mg/dL    BUN 36 (H) 6 - 20 MG/DL    Creatinine 4.98 (H) 0.70 - 1.30 MG/DL    BUN/Creatinine ratio 7 (L) 12 - 20      GFR est AA 14 (L) >60 ml/min/1.73m2    GFR est non-AA 11 (L) >60 ml/min/1.73m2    Calcium 9.2 8.5 - 10.1 MG/DL       Medications: Reviewed    abd US     1. Mild heterogeneity of liver with mild lobularity of capsular configuration  but no focal abnormality. Correlate for chronic liver disease. 2. Splenomegaly. 3. Cholelithiasis without biliary ductal dilatation. 4. Increased echogenicity of kidneys, as previously noted, consistent with  medical renal parenchymal disease. Correlate with renal function parameters. No  hydronephrosis. Assessment/Plan     Chelsea Blount is a 81 y.o.  male with CKD who was admitted with diarrhea 2/2 C diff. Per prior history \"Patient had C. Diff in 2017 and failed flagyl and PO Vanc. He then underwent two fecal transplants via dobhoff because the first was unsuccessful. He then had eighteen months without infection. In September 2019, patient developed toe infection and was given 7 day course of antibiotics which caused him to develop C. Diff infection. He was restarted on PO Vancomycin and has been on 4x daily dosing since his toe infection, approximately 2 months. Patient is followed closely by PCP for his C. Diff. Infection, he was seen by Dr. Sherryle Done with initial episodes c. diff. \"    - Continue oral vancomycin. Patient is clinically improving it seems. Dificid too expensive.   - No acute indication for fecal transplant  - Elevated LFTs 2/2 ischemia. Acute hep panel neg. Trending down. Have PCP check hepatic function panel in 1 week. -Will sign off. Please call with any questions. Stable for discharge from GI perspective. Setup outpatient GI follow-up with Dr. Carin Momin in 2 weeks.     Attending Physician: Caren Nichole MD   Date/Time:  11/4/2019

## 2019-11-04 NOTE — PROGRESS NOTES
ANETA:    Possible discharge today following Nephrology consult. Home with wife at discharge. Care Management Interventions  PCP Verified by CM: Yes  Mode of Transport at Discharge: (Home with wife)  Transition of Care Consult (CM Consult): Discharge Planning  MyChart Signup: No  Discharge Durable Medical Equipment: No  Physical Therapy Consult: No  Occupational Therapy Consult: No  Speech Therapy Consult: No  Current Support Network: Lives with Spouse  Confirm Follow Up Transport: Family  Plan discussed with Pt/Family/Caregiver: Yes  Discharge Location  Discharge Placement: Home with family assistance    Reason for Admission:   hyperKalemia               RRAT Score:   22               Resources/supports as identified by patient/family:    Patient's wife has been taking care of him at home and is comfortable doing so. Top Challenges facing patient (as identified by patient/family and CM): Finances/Medication cost?     No needs               Transportation? Wife provides transportation              Support system or lack thereof? Wife is supportive. Living arrangements? One story house that is handicapped accessible. Self-care/ADLs/Cognition? Patient's wife helps him with ADLs,          Current Advanced Directive/Advance Care Plan: On file. Plan for utilizing home health:   No HH orders. Transition of Care Plan:      CM met with patient to introduce CM role, verify demographics and begin discharge planning. Patient lives in a one story house with his wife. His wife is very supportive and has been taking care of him for several years. Patient gets prescriptions filled at [facility] and occasionally at [facility]. Patient has a walker and a cane at home. He also has grab bars, shower chair and raised toilet seat in the bathroom.     Patient's wife was at the bedside and is planning to take him home at discharge.     Sangeetha Luque, PAUL/CRM

## 2019-11-04 NOTE — DISCHARGE SUMMARY
Discharge Summary       PATIENT ID: Bk Boo  MRN: [id number]   YOB: 1935    DATE OF ADMISSION: 11/1/2019 10:50 AM    DATE OF DISCHARGE: 11/4/2019   PRIMARY CARE PROVIDER: Bhargavi Bhatti MD     ATTENDING PHYSICIAN: Candi Coley MD  DISCHARGING PROVIDER: Candi Coley MD    To contact this individual call [phone] and ask the  to page. If unavailable ask to be transferred the Adult Hospitalist Department. CONSULTATIONS: IP CONSULT TO NEPHROLOGY  IP CONSULT TO GASTROENTEROLOGY  IP CONSULT TO INFECTIOUS DISEASES  IP CONSULT TO HOSPITALIST    PROCEDURES/SURGERIES: * No surgery found *    ADMITTING [address] [place] [address] COURSE:   Admitted with abnormal labs, Nephrology evaluated and continues to make good amounts of urine, had hyperKalemia, which is improved and remains stable. Deranged LFTs US unremarkable and his liver numbers improving. Will need recheck LFTs in a week or so after dc. Risk of Re-Admission: mod/high  DISCHARGE DIAGNOSES / PLAN:      #. 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 Hyperkalemia: likely 2nd to above. - resolved. - K levels Improved and remain stable. Monitor Urine output 1700 in 24hours. monitored renal function closely. Renal US: medical renal disease   - Bladder scans ruled out urinary retention, UA: proteinUria. No UTI. Nephrology following. And will need close f/u outpatient after dc.     #. C.diff: recurrent episodes, currently on Vancomycin po,   - GI following: fecal transplant not planned. ID evaluated: Deficid too expensive, ctn vanc     #. Deranged LFTs: - improving, possible hypotension induced. RUQ US: heterogenous liver, monitor levels in a week after discharge. F/u with GI in 2 weeks.     #. Hx of prostate Ca: s/p prostatectomy, presumed cured  #. Hx of anal Ca: under close supervision. S/p Rx and Chemo. #. Hypothyroidism: home dose synthroid, TSH 0.7  #.  HLD: chronic, Stable, Home regimen FOLLOW UP APPOINTMENTS:    Follow-up Information     Follow up With Specialties Details Why Contact Info    Sebastian Barahona MD [facility] In 1 week  [address]      Wil Licea MD Gastroenterology In 2 weeks  [address] [place] [address]  [phone]           ADDITIONAL CARE RECOMMENDATIONS:  Follow up with PCP, Nephrology and GI    DIET: Resume previous diet    ACTIVITY: Activity as tolerated    DISCHARGE MEDICATIONS:  Current Discharge Medication List      CONTINUE these medications which have NOT CHANGED    Details   Lactobacillus acidophilus (PROBIOTIC PO) Take 2 Doses by mouth three (3) times daily. Probiotic gummies      vancomycin (FIRVANQ) 50 mg/mL oral solution Take 125 mg by mouth every six (6) hours. Due to taper down on Monday 11/04   Indications: diarrhea from an infection with Clostridium difficile bacteria      sodium bicarbonate 650 mg tablet Take 650 mg by mouth four (4) times daily. levothyroxine (SYNTHROID) 112 mcg tablet Take 112 mcg by mouth Daily (before breakfast). epoetin jaquelin (PROCRIT INJECTION) by Injection route. Every 6 weeks      atorvastatin (LIPITOR) 10 mg tablet Take 10 mg by mouth daily. Cholecalciferol, Vitamin D3, (VITAMIN D) 1,000 unit Cap Take  by mouth daily. STOP taking these medications       vancomycin 125 mg/2.5 mL syrg Comments:   Reason for Stopping:         B infantis/B ani/B jerry/B bifid (PROBIOTIC 4X PO) Comments:   Reason for Stopping:             NOTIFY YOUR PHYSICIAN FOR ANY OF THE FOLLOWING:   Fever over 101 degrees for 24 hours. Chest pain, shortness of breath, fever, chills, nausea, vomiting, diarrhea, change in mentation, falling, weakness, bleeding. Severe pain or pain not relieved by medications. Or, any other signs or symptoms that you may have questions about.     DISPOSITION:    Home With:   OT  PT  HH  RN       Long term SNF/Inpatient Rehab x Independent/assisted living    Hospice    Other:     PATIENT CONDITION AT DISCHARGE:     Functional status    Poor     Deconditioned     Independent      Cognition     Lucid     Forgetful     Dementia      Catheters/lines (plus indication)    Vazquez     PICC     PEG     None      Code status     Full code     DNR      PHYSICAL EXAMINATION AT DISCHARGE:  Patient seen and examined at bedside, Condition stable, explained discharge and follow up plans. /61 (BP 1 Location: Left arm, BP Patient Position: At rest)   Pulse (!) 57   Temp 97.4 °F (36.3 °C)   Resp 18   SpO2 100%   General:  Alert, oriented, No acute distress, Frail elderly Gentleman  Resp:  No accessory muscle use, Good AE. Neuro:  Grossly normal, no focal neuro deficits, follows commands   CHRONIC MEDICAL DIAGNOSES:  Problem List as of 11/4/2019 Date Reviewed: 5/31/2017          Codes Class Noted - Resolved    * (Principal) Hyperkalemia ICD-10-CM: E87.5  ICD-9-CM: 276.7  11/1/2019 - Present        CKD (chronic kidney disease) stage 3, GFR 30-59 ml/min (HCC) (Chronic) ICD-10-CM: N18.3  ICD-9-CM: 585.3  6/5/2017 - Present        CMML (chronic myelomonocytic leukemia) ([facility] [address] (Chronic) ICD-10-CM: C93.10  ICD-9-CM: 205.10  6/3/2017 - Present        Neuropathy (Chronic) ICD-10-CM: G62.9  ICD-9-CM: 355.9  5/31/2017 - Present        Infection of right foot ICD-10-CM: L08.9  ICD-9-CM: 686.9  5/31/2017 - Present        Hypothyroidism (Chronic) ICD-10-CM: E03.9  ICD-9-CM: 244.9  5/31/2017 - Present        Hyperlipidemia (Chronic) ICD-10-CM: E78.5  ICD-9-CM: 272.4  5/31/2017 - Present              37 minutes were spent with the patient on counseling and coordination of care.     Signed:   Geena Zurita MD  11/4/2019  9:29 AM

## 2019-11-04 NOTE — PROGRESS NOTES
Bedside shift change report given to Analilia Tiwari RN (oncoming nurse) by Antelmo Barbosa RN (offgoing nurse). Report included the following information SBAR, Kardex, Intake/Output, MAR and Recent Results.

## 2019-11-04 NOTE — WOUND CARE
WOCN Note:  
 
New consult for feet. Chart shows: 
Admitted for acute renal failure with hyperkalemia History of stage 4 CKD & c-diff Anticipating discharge today. Assessment:  
Appropriately conversational and mobile. He reports no pain. Bilateral heel skin intact and without erythema. No edema or erythema to lower legs and feet. DP pulses bilaterally. calluses to both feet on plantar surface over 1st metatarsal head. Right foot originally a surgical site but is now closed. No exudate. Cleaned feet, relieved them of some dry skin, applied betadine to left plantar foot and applied lotion to rest of his feet. Offloaded each callus with Mepilex foam.  
 
Recommendations:   
Continue wound care as directed by Dr. Merle Bonds using betadine. Transition of Care: He is followed by Dr. Merle Bonds and his wife does daily wound care as directed using betadine. MARGE Wong, RN, [employer] Certified Wound, Ostomy, Continence Nurse 
office [phone] 
pager [phone] or call  to page

## 2019-11-04 NOTE — PROGRESS NOTES
Attempted to contact Nephrology office to inquire who would be seeing pt today for Nephrology. Instructed that Dr Kaleb Camargo was making rounds at this facility and that we could contact him via perfect serve. Used perfect serve to contact MD and called him at number given, message left as instructed.

## 2019-11-07 ENCOUNTER — ANESTHESIA (OUTPATIENT)
Dept: SURGERY | Age: 84
End: 2019-11-07
Payer: MEDICARE

## 2019-11-07 ENCOUNTER — ANESTHESIA EVENT (OUTPATIENT)
Dept: SURGERY | Age: 84
End: 2019-11-07
Payer: MEDICARE

## 2019-11-07 ENCOUNTER — HOSPITAL ENCOUNTER (OUTPATIENT)
Age: 84
Setting detail: OUTPATIENT SURGERY
Discharge: HOME | End: 2019-11-07
Payer: MEDICARE

## 2019-11-07 VITALS
HEIGHT: 70 IN | HEART RATE: 60 BPM | OXYGEN SATURATION: 98 % | RESPIRATION RATE: 15 BRPM | WEIGHT: 135 LBS | TEMPERATURE: 97.9 F | BODY MASS INDEX: 19.33 KG/M2 | SYSTOLIC BLOOD PRESSURE: 109 MMHG | DIASTOLIC BLOOD PRESSURE: 48 MMHG

## 2019-11-07 DIAGNOSIS — N18.30 CKD (CHRONIC KIDNEY DISEASE) STAGE 3, GFR 30-59 ML/MIN (HCC): Primary | Chronic | ICD-10-CM

## 2019-11-07 LAB
BASOPHILS # BLD: 0 K/UL (ref 0–0.1)
BASOPHILS NFR BLD: 0 % (ref 0–1)
DIFFERENTIAL METHOD BLD: ABNORMAL
EOSINOPHIL # BLD: 0 K/UL (ref 0–0.4)
EOSINOPHIL NFR BLD: 0 % (ref 0–7)
ERYTHROCYTE [DISTWIDTH] IN BLOOD BY AUTOMATED COUNT: 17.2 % (ref 11.5–14.5)
HCT VFR BLD AUTO: 31.3 % (ref 36.6–50.3)
HGB BLD-MCNC: 9.5 G/DL (ref 12.1–17)
IMM GRANULOCYTES # BLD AUTO: 1.7 K/UL (ref 0–0.04)
IMM GRANULOCYTES NFR BLD AUTO: 11 % (ref 0–0.5)
LYMPHOCYTES # BLD: 1.1 K/UL (ref 0.8–3.5)
LYMPHOCYTES NFR BLD: 7 % (ref 12–49)
MCH RBC QN AUTO: 29.8 PG (ref 26–34)
MCHC RBC AUTO-ENTMCNC: 30.4 G/DL (ref 30–36.5)
MCV RBC AUTO: 98.1 FL (ref 80–99)
MONOCYTES # BLD: 6 K/UL (ref 0–1)
MONOCYTES NFR BLD: 39 % (ref 5–13)
NEUTS SEG # BLD: 6.7 K/UL (ref 1.8–8)
NEUTS SEG NFR BLD: 43 % (ref 32–75)
NRBC # BLD: 0.1 K/UL (ref 0–0.01)
NRBC BLD-RTO: 0.6 PER 100 WBC
PLATELET # BLD AUTO: 68 K/UL (ref 150–400)
PMV BLD AUTO: 11.2 FL (ref 8.9–12.9)
RBC # BLD AUTO: 3.19 M/UL (ref 4.1–5.7)
RBC MORPH BLD: ABNORMAL
WBC # BLD AUTO: 15.5 K/UL (ref 4.1–11.1)

## 2019-11-07 PROCEDURE — 77030002987 HC SUT PROL J&J -B

## 2019-11-07 PROCEDURE — 74011250636 HC RX REV CODE- 250/636: Performed by: ANESTHESIOLOGY

## 2019-11-07 PROCEDURE — 74011250636 HC RX REV CODE- 250/636: Performed by: NURSE ANESTHETIST, CERTIFIED REGISTERED

## 2019-11-07 PROCEDURE — 77030002924 HC SUT GORTX WLGO -B

## 2019-11-07 PROCEDURE — 74011250636 HC RX REV CODE- 250/636

## 2019-11-07 PROCEDURE — 76010000153 HC OR TIME 1.5 TO 2 HR

## 2019-11-07 PROCEDURE — 77030018846 HC SOL IRR STRL H20 ICUM -A

## 2019-11-07 PROCEDURE — C1768 GRAFT, VASCULAR: HCPCS

## 2019-11-07 PROCEDURE — 77030003601 HC NDL NRV BLK BBMI -A

## 2019-11-07 PROCEDURE — 77030031139 HC SUT VCRL2 J&J -A

## 2019-11-07 PROCEDURE — 76210000006 HC OR PH I REC 0.5 TO 1 HR

## 2019-11-07 PROCEDURE — 85025 COMPLETE CBC W/AUTO DIFF WBC: CPT

## 2019-11-07 PROCEDURE — 77030008462 HC STPLR SKN PROX J&J -A

## 2019-11-07 PROCEDURE — 77030040922 HC BLNKT HYPOTHRM STRY -A

## 2019-11-07 PROCEDURE — 76210000021 HC REC RM PH II 0.5 TO 1 HR

## 2019-11-07 PROCEDURE — 77030040361 HC SLV COMPR DVT MDII -B

## 2019-11-07 PROCEDURE — 77030011640 HC PAD GRND REM COVD -A

## 2019-11-07 PROCEDURE — 76060000034 HC ANESTHESIA 1.5 TO 2 HR

## 2019-11-07 PROCEDURE — 36415 COLL VENOUS BLD VENIPUNCTURE: CPT

## 2019-11-07 DEVICE — PROPATEN VASCULAR GRAFT SW 4-7MMX45CM TAPERED HEPARIN
Type: IMPLANTABLE DEVICE | Site: ARM | Status: FUNCTIONAL
Brand: GORE PROPATEN VASCULAR GRAFT

## 2019-11-07 RX ORDER — ACETAMINOPHEN 325 MG/1
650 TABLET ORAL ONCE
Status: DISCONTINUED | OUTPATIENT
Start: 2019-11-07 | End: 2019-11-07 | Stop reason: HOSPADM

## 2019-11-07 RX ORDER — SODIUM CHLORIDE 0.9 % (FLUSH) 0.9 %
5-40 SYRINGE (ML) INJECTION AS NEEDED
Status: DISCONTINUED | OUTPATIENT
Start: 2019-11-07 | End: 2019-11-07 | Stop reason: HOSPADM

## 2019-11-07 RX ORDER — SODIUM CHLORIDE 9 MG/ML
INJECTION, SOLUTION INTRAVENOUS
Status: DISCONTINUED | OUTPATIENT
Start: 2019-11-07 | End: 2019-11-07 | Stop reason: HOSPADM

## 2019-11-07 RX ORDER — HYDROMORPHONE HYDROCHLORIDE 1 MG/ML
0.2 INJECTION, SOLUTION INTRAMUSCULAR; INTRAVENOUS; SUBCUTANEOUS
Status: DISCONTINUED | OUTPATIENT
Start: 2019-11-07 | End: 2019-11-07 | Stop reason: HOSPADM

## 2019-11-07 RX ORDER — ONDANSETRON 2 MG/ML
4 INJECTION INTRAMUSCULAR; INTRAVENOUS AS NEEDED
Status: DISCONTINUED | OUTPATIENT
Start: 2019-11-07 | End: 2019-11-07 | Stop reason: HOSPADM

## 2019-11-07 RX ORDER — ROPIVACAINE HYDROCHLORIDE 5 MG/ML
30 INJECTION, SOLUTION EPIDURAL; INFILTRATION; PERINEURAL AS NEEDED
Status: DISCONTINUED | OUTPATIENT
Start: 2019-11-07 | End: 2019-11-07 | Stop reason: HOSPADM

## 2019-11-07 RX ORDER — SODIUM CHLORIDE 0.9 % (FLUSH) 0.9 %
5-40 SYRINGE (ML) INJECTION EVERY 8 HOURS
Status: DISCONTINUED | OUTPATIENT
Start: 2019-11-07 | End: 2019-11-07 | Stop reason: HOSPADM

## 2019-11-07 RX ORDER — SODIUM CHLORIDE, SODIUM LACTATE, POTASSIUM CHLORIDE, CALCIUM CHLORIDE 600; 310; 30; 20 MG/100ML; MG/100ML; MG/100ML; MG/100ML
25 INJECTION, SOLUTION INTRAVENOUS CONTINUOUS
Status: DISCONTINUED | OUTPATIENT
Start: 2019-11-07 | End: 2019-11-07 | Stop reason: HOSPADM

## 2019-11-07 RX ORDER — FENTANYL CITRATE 50 UG/ML
25 INJECTION, SOLUTION INTRAMUSCULAR; INTRAVENOUS
Status: DISCONTINUED | OUTPATIENT
Start: 2019-11-07 | End: 2019-11-07 | Stop reason: HOSPADM

## 2019-11-07 RX ORDER — HYDROCODONE BITARTRATE AND ACETAMINOPHEN 7.5; 325 MG/1; MG/1
1 TABLET ORAL
Qty: 25 TAB | Refills: 0 | Status: SHIPPED | OUTPATIENT
Start: 2019-11-07 | End: 2019-11-14

## 2019-11-07 RX ORDER — DIPHENHYDRAMINE HYDROCHLORIDE 50 MG/ML
12.5 INJECTION, SOLUTION INTRAMUSCULAR; INTRAVENOUS AS NEEDED
Status: DISCONTINUED | OUTPATIENT
Start: 2019-11-07 | End: 2019-11-07 | Stop reason: HOSPADM

## 2019-11-07 RX ORDER — LIDOCAINE HYDROCHLORIDE 10 MG/ML
0.1 INJECTION, SOLUTION EPIDURAL; INFILTRATION; INTRACAUDAL; PERINEURAL AS NEEDED
Status: DISCONTINUED | OUTPATIENT
Start: 2019-11-07 | End: 2019-11-07 | Stop reason: HOSPADM

## 2019-11-07 RX ORDER — PROPOFOL 10 MG/ML
INJECTION, EMULSION INTRAVENOUS
Status: DISCONTINUED | OUTPATIENT
Start: 2019-11-07 | End: 2019-11-07 | Stop reason: HOSPADM

## 2019-11-07 RX ORDER — MIDAZOLAM HYDROCHLORIDE 1 MG/ML
0.5 INJECTION, SOLUTION INTRAMUSCULAR; INTRAVENOUS
Status: DISCONTINUED | OUTPATIENT
Start: 2019-11-07 | End: 2019-11-07 | Stop reason: HOSPADM

## 2019-11-07 RX ORDER — OXYCODONE HYDROCHLORIDE 5 MG/1
5 TABLET ORAL AS NEEDED
Status: DISCONTINUED | OUTPATIENT
Start: 2019-11-07 | End: 2019-11-07 | Stop reason: HOSPADM

## 2019-11-07 RX ORDER — SODIUM CHLORIDE 9 MG/ML
25 INJECTION, SOLUTION INTRAVENOUS CONTINUOUS
Status: DISCONTINUED | OUTPATIENT
Start: 2019-11-07 | End: 2019-11-07 | Stop reason: HOSPADM

## 2019-11-07 RX ORDER — SODIUM CHLORIDE, SODIUM LACTATE, POTASSIUM CHLORIDE, CALCIUM CHLORIDE 600; 310; 30; 20 MG/100ML; MG/100ML; MG/100ML; MG/100ML
100 INJECTION, SOLUTION INTRAVENOUS CONTINUOUS
Status: DISCONTINUED | OUTPATIENT
Start: 2019-11-07 | End: 2019-11-07 | Stop reason: HOSPADM

## 2019-11-07 RX ORDER — CEFAZOLIN SODIUM/WATER 2 G/20 ML
2 SYRINGE (ML) INTRAVENOUS ONCE
Status: COMPLETED | OUTPATIENT
Start: 2019-11-07 | End: 2019-11-07

## 2019-11-07 RX ORDER — MORPHINE SULFATE 10 MG/ML
2 INJECTION, SOLUTION INTRAMUSCULAR; INTRAVENOUS
Status: DISCONTINUED | OUTPATIENT
Start: 2019-11-07 | End: 2019-11-07 | Stop reason: HOSPADM

## 2019-11-07 RX ORDER — MIDAZOLAM HYDROCHLORIDE 1 MG/ML
1 INJECTION, SOLUTION INTRAMUSCULAR; INTRAVENOUS AS NEEDED
Status: DISCONTINUED | OUTPATIENT
Start: 2019-11-07 | End: 2019-11-07 | Stop reason: HOSPADM

## 2019-11-07 RX ORDER — FENTANYL CITRATE 50 UG/ML
50 INJECTION, SOLUTION INTRAMUSCULAR; INTRAVENOUS AS NEEDED
Status: DISCONTINUED | OUTPATIENT
Start: 2019-11-07 | End: 2019-11-07 | Stop reason: HOSPADM

## 2019-11-07 RX ADMIN — PROPOFOL 40 MCG/KG/MIN: 10 INJECTION, EMULSION INTRAVENOUS at 07:59

## 2019-11-07 RX ADMIN — MEPIVACAINE HYDROCHLORIDE 20 ML: 20 INJECTION, SOLUTION EPIDURAL; INFILTRATION at 07:50

## 2019-11-07 RX ADMIN — FENTANYL CITRATE 50 MCG: 50 INJECTION, SOLUTION INTRAMUSCULAR; INTRAVENOUS at 07:48

## 2019-11-07 RX ADMIN — MIDAZOLAM 1 MG: 1 INJECTION INTRAMUSCULAR; INTRAVENOUS at 07:48

## 2019-11-07 RX ADMIN — SODIUM CHLORIDE 25 ML/HR: 900 INJECTION, SOLUTION INTRAVENOUS at 07:00

## 2019-11-07 RX ADMIN — Medication 2 G: at 08:00

## 2019-11-07 RX ADMIN — SODIUM CHLORIDE: 900 INJECTION, SOLUTION INTRAVENOUS at 07:58

## 2019-11-07 NOTE — ANESTHESIA POSTPROCEDURE EVALUATION
Post-Anesthesia Evaluation and Assessment    Patient: Cora Quinones MRN: [id number]  SSN: [id number]    YOB: 1935  Age: 81 y.o. Sex: male      I have evaluated the patient and they are stable and ready for discharge from the PACU. Cardiovascular Function/Vital Signs  Visit Vitals  /50   Pulse 63   Temp 36.6 °C (97.9 °F)   Resp 16   Ht 5' 10\" (1.778 m)   Wt 61.2 kg (135 lb)   SpO2 100%   BMI 19.37 kg/m²       Patient is status post Regional anesthesia for Procedure(s):  LEFT ARM DIALYSIS GRAFT INSERTION USING GORTEX GRAFT. Nausea/Vomiting: None    Postoperative hydration reviewed and adequate. Pain:  Pain Scale 1: Visual (11/07/19 0934)  Pain Intensity 1: 0 (11/07/19 0934)   Managed    Neurological Status:   Neuro (WDL): Exceptions to WDL (11/07/19 0934)  Neuro  Neurologic State: Sleeping (11/07/19 0934)  LUE Motor Response: Tingling (11/07/19 0716)  LLE Motor Response: Numbness (11/07/19 0716)  RUE Motor Response: Tingling (11/07/19 0716)  RLE Motor Response: Numbness (11/07/19 0716)   At baseline, Supraclavicular block in place    Mental Status, Level of Consciousness: Alert and oriented     Pulmonary Status:   O2 Device: Nasal cannula (11/07/19 0934)   Adequate oxygenation and airway patent    Complications related to anesthesia: None    Post-anesthesia assessment completed. No concerns    Signed By: Anila Watson MD     November 7, 2019              Procedure(s):  LEFT ARM DIALYSIS GRAFT INSERTION USING GORTEX GRAFT.    regional    <BSHSIANPOST>    Vitals Value Taken Time   /50 11/7/2019  9:45 AM   Temp     Pulse 66 11/7/2019  9:53 AM   Resp 18 11/7/2019  9:53 AM   SpO2 100 % 11/7/2019  9:53 AM   Vitals shown include unvalidated device data.

## 2019-11-07 NOTE — ANESTHESIA PROCEDURE NOTES
Peripheral Block    Start time: 11/7/2019 7:47 AM  End time: 11/7/2019 7:51 AM  Performed by: Henry Araujo MD  Authorized by: Henry Araujo MD       Pre-procedure:    Indications: at surgeon's request and primary anesthetic    Preanesthetic Checklist: patient identified, risks and benefits discussed, site marked, timeout performed, anesthesia consent given and patient being monitored      Block Type:   Block Type:  Supraclavicular  Laterality:  Left  Monitoring:  Standard ASA monitoring, continuous pulse ox, frequent vital sign checks, heart rate, oxygen and responsive to questions  Injection Technique:  Single shot  Procedures: ultrasound guided    Patient Position: supine  Prep: chlorhexidine    Location:  Supraclavicular  Needle Type:  Stimuplex  Needle Gauge:  22 G  Needle Localization:  Ultrasound guidance    Assessment:  Number of attempts:  1  Injection Assessment:  Incremental injection every 5 mL, no paresthesia, ultrasound image on chart, local visualized surrounding nerve on ultrasound, negative aspiration for blood and no intravascular symptoms  Patient tolerance:  Patient tolerated the procedure well with no immediate complications

## 2019-11-07 NOTE — DISCHARGE INSTRUCTIONS
Patient Discharge Instructions    Bk Boo / [id number] : 1935    Admitted 2019 Discharged: 2019     Take Home Medications     . · It is important that you take the medication exactly as they are prescribed. · Keep your medication in the bottles provided by the pharmacist and keep a list of the medication names, dosages, and times to be taken in your wallet. · Do not take other medications without consulting your doctor. What to do at Home    Recommended diet: Renal Diet,     Recommended activity: Activity as tolerated,     Additional Instructions: remove dressings on Sat and leave open if no drainage    Follow-up with Dr Esha Frias in 2 weeks, call [phone] for appt        Information obtained by :  I understand that if any problems occur once I am at home I am to contact my physician. I understand and acknowledge receipt of the instructions indicated above.                                                                                                                                            Physician's or R.N.'s Signature                                                                  Date/Time                                                                                                                                              Patient or Representative Signature                                                          Date/Time

## 2019-11-07 NOTE — BRIEF OP NOTE
BRIEF OPERATIVE NOTE    Date of Procedure: 11/7/2019   Preoperative Diagnosis: END STAGE RENAL DISEASE  Postoperative Diagnosis: END STAGE RENAL DISEASE    Procedure(s):  LEFT ARM DIALYSIS GRAFT INSERTION USING GORTEX GRAFT  Surgeon(s) and Role:     Kevon Tanner MD - Primary         Surgical Assistant: Alondra    Surgical Staff:  Circ-1: Jatin Villar RN  Scrub Tech-1: David Hicks  Scrub Tech-2: Stacy Cole  Surg Asst-1: Ruma Ibanez  Event Time In Time Out   Incision Start 0815    Incision Close 0919      Anesthesia: Regional   Estimated Blood Loss: 25 ml  Specimens: * No specimens in log *   Findings: inadequate vein for fistula   Complications: none  Implants:   Implant Name Type Inv.  Item Serial No.  Lot No. LRB No. Used Action   [facility] PROPATEN [id number] - [id number]  [facility] PROPATEN [id number]  [employer] N/A Left 1 Implanted

## 2019-11-07 NOTE — ROUTINE PROCESS
Patient: Pool Briceno MRN: [id number]  SSN: [id number]   YOB: 1935  Age: 81 y.o. Sex: male     Patient is status post Procedure(s):  LEFT ARM BASILIC VEIN TRANSPOSITION FISTULA.     Surgeon(s) and Role:     * Keshav Tim MD - Primary    Local/Dose/Irrigation:                                           Dressing/Packing:       Splint/Cast:  ]    Other:

## 2019-11-07 NOTE — OP NOTES
[address]   OPERATIVE REPORT    Name:  Edy Meza  MR#:  [id number]  :  1935  ACCOUNT #:  [de-identified]  DATE OF SERVICE:  2019    PREOPERATIVE DIAGNOSIS:  Renal failure. POSTOPERATIVE DIAGNOSIS:  Renal failure. PROCEDURE PERFORMED:  Insertion of left upper arm [place]uleshoe-Hector dialysis graft. SURGEON:  Edmond Rizzo MD    ASSISTANT:  Lavanda Lundborg. ANESTHESIA:  Regional block. COMPLICATIONS:  None. SPECIMENS REMOVED:  None. IMPLANTS:  4-7 mm tapered [place]hoe-Hector graft. ESTIMATED BLOOD LOSS:  25 mL. INDICATIONS:  The patient is an 36-year-old male with chronic renal insufficiency, not yet requiring dialysis. His preoperative vein mapping suggests that the left upper arm basilic vein would be adequate for a native fistula and this was the initial a surgical plan. PROCEDURE:  The patient's left arm was prepped and draped. A longitudinal incision was made in the distal medial upper arm. Basilic vein was identified and dissected free. The vein was moderate in size. It coursed towards the antecubital level much more proximally than usual.  A transverse incision was made at the antecubital level. The vein was identified at this level and was more lateral consistent with the usual cephalic vein course. The vein was dissected free in the proximal forearm and divided. The vein was then dissected proximally. It was freed up from the antecubital to the upper arm incision and removed from its normal location. It was then dilated with saline. It was felt to be inadequate for the native fistula. With this finding, a graft was planned. A longitudinal incision was made in the proximal medial upper arm. The basilic vein and brachial artery were dissected free. A 4-7 mm tapered graft was obtained and was tunneled in a loop configuration in the upper arm using 3 counterincisions.   The arterial side was lateral.  The graft was sewn end-to-side to the brachial artery and then end-to-side to the basilic vein using running CV5 Pelican-Hector suture. Following this, there was a palpable thrill in the graft. The incisions were closed with Vicryl subcutaneous suture and skin staples. Dressings were applied and the patient was returned to the recovery room in stable condition.       Chantal Hogue MD      [id number]  D:  11/07/2019 9:27  T:  11/07/2019 10:28  JOB #:  [id number]

## 2019-11-07 NOTE — ANESTHESIA PREPROCEDURE EVALUATION
Relevant Problems   No relevant active problems       Anesthetic History               Review of Systems / Medical History  Patient summary reviewed, nursing notes reviewed and pertinent labs reviewed    Pulmonary  Within defined limits                 Neuro/Psych   Within defined limits           Cardiovascular              Hyperlipidemia    Exercise tolerance: >4 METS     GI/Hepatic/Renal         Renal disease: ESRD       Endo/Other      Hypothyroidism  Arthritis, cancer and anemia     Other Findings   Comments: CMML with thrombocytopenia           Physical Exam    Airway  Mallampati: III  TM Distance: 4 - 6 cm  Neck ROM: normal range of motion   Mouth opening: Normal     Cardiovascular  Regular rate and rhythm,  S1 and S2 normal,  no murmur, click, rub, or gallop             Dental    Dentition: Upper dentition intact, Lower dentition intact and Caps/crowns     Pulmonary  Breath sounds clear to auscultation               Abdominal  GI exam deferred       Other Findings            Anesthetic Plan    ASA: 3  Anesthesia type: regional - supraclavicular block            Anesthetic plan and risks discussed with: Patient

## 2019-11-07 NOTE — PERIOP NOTES
8715 timeout with Dr. Greg Balderrama for a left arm nerve block. Patient was placed on monitors, consented. Wife at bedside. 2% of mepivacaine was used (20ml). 1 mg of versed and 50 mcg of fentanyl given. Patient taken into OR immediately after block.

## 2019-11-08 NOTE — CDMP QUERY
Pt admitted with CKD 4/5 with reduced urine output, noted to have Neph consult with VERONICA documented. After further study is it possible to determine if you were evaluating and/or treating any of the following:  Acute kidney insufficiency on CKD  Acute kidney injury on CKD as evidenced by (please specify)  Other, please specify  Clinically unable to determine The medical record reflects the following: 
  Risk Factors: CKD- worsening Clinical Indicators: 11/2 PN- ARF on CKD stage 4/5: known to Dr Deborah Mackenzie- with reduced urine output. 11/1 Neph- CKD stage 5, creat up from mid 3's beginning of 2019 to 5, close to dialysis; VERONICA, prerenal  11/1- BUN: 42 Creatinine: 5.49 GFR est non-AA: 10  11/4- BUN: 36 Creatinine: 4.98 GFR est non-AA: 11 Treatment: IVFs, Neph consult, lab monitoring Thank you, Bri Webb RN 
[facility] 
[phone]

## 2020-01-14 ENCOUNTER — HOSPITAL ENCOUNTER (OUTPATIENT)
Dept: INFUSION THERAPY | Age: 85
Discharge: HOME | End: 2020-01-14
Payer: MEDICARE

## 2020-01-14 VITALS
DIASTOLIC BLOOD PRESSURE: 84 MMHG | TEMPERATURE: 98.5 F | RESPIRATION RATE: 16 BRPM | HEART RATE: 72 BPM | SYSTOLIC BLOOD PRESSURE: 132 MMHG | OXYGEN SATURATION: 98 %

## 2020-01-14 PROCEDURE — 74011250636 HC RX REV CODE- 250/636: Performed by: INTERNAL MEDICINE

## 2020-01-14 PROCEDURE — 96366 THER/PROPH/DIAG IV INF ADDON: CPT

## 2020-01-14 PROCEDURE — 96365 THER/PROPH/DIAG IV INF INIT: CPT

## 2020-01-14 RX ADMIN — IRON SUCROSE 300 MG: 20 INJECTION, SOLUTION INTRAVENOUS at 14:05

## 2020-01-14 NOTE — PROGRESS NOTES
Outpatient Infusion [facility] Progress Note    1300 Pt admit to [facility] for [address] ambulatory (with rollator) in stable condition. Assessment completed. No new concerns voiced. Patient stuck peripherally for an IV, which had positive blood return. Medications ordered from pharmacy. Visit Vitals  BP 96/64 (BP 1 Location: Right arm, BP Patient Position: Sitting)   Pulse 74   Temp 98.2 °F (36.8 °C)   Resp 16   SpO2 98%       Medications Administered     iron sucrose (VENOFER) 300 mg in 0.9% sodium chloride 250 mL IVPB     Admin Date  01/14/2020 Action  New Bag Dose  300 mg Rate  176.7 mL/hr Route  IntraVENous Administered By  Angelica Sanchez                1606 Pt tolerated treatment well. Patient and supportive family member stayed in [facility] for a 30 minute observation period with no apparent side effects. IV maintained positive blood return throughout the course of treatment and was removed at the conclusion of therapy. D/c home ambulatory in no distress. Pt aware of next appointment scheduled for 1/28/20.     Maryanne Osman RN

## 2020-01-28 ENCOUNTER — HOSPITAL ENCOUNTER (OUTPATIENT)
Dept: INFUSION THERAPY | Age: 85
Discharge: HOME | End: 2020-01-28
Payer: MEDICARE

## 2020-01-28 VITALS
DIASTOLIC BLOOD PRESSURE: 63 MMHG | TEMPERATURE: 97 F | SYSTOLIC BLOOD PRESSURE: 143 MMHG | HEART RATE: 59 BPM | RESPIRATION RATE: 18 BRPM

## 2020-01-28 PROCEDURE — 74011250636 HC RX REV CODE- 250/636: Performed by: INTERNAL MEDICINE

## 2020-01-28 PROCEDURE — 96365 THER/PROPH/DIAG IV INF INIT: CPT

## 2020-01-28 RX ADMIN — SODIUM CHLORIDE 300 MG: 900 INJECTION, SOLUTION INTRAVENOUS at 13:50

## 2020-01-28 NOTE — PROGRESS NOTES
[facility] VISIT NOTE    4137  Pt arrived at [facility] ambulatory via walker and in no distress for IV Venofer. Assessment completed, no new complaints at this time. PIV started in [facility] with no difficulty. Positive blood return noted. Medications received:  NS at [address] 1785 IV    Patient Vitals for the past 12 hrs:   Temp Pulse Resp BP   01/28/20 1520 97 °F (36.1 °C) (!) 59 18 143/63   01/28/20 1450 98.1 °F (36.7 °C) 62 18 141/64   01/28/20 1420 97.9 °F (36.6 °C) (!) 57 18 143/63   01/28/20 1255 97.8 °F (36.6 °C) 65 18 125/56     Tolerated treatment well, no adverse reaction noted. PIV removed per protocol. Pt refused 30 minute observation.   D/C'd from [facility] ambulatory via walker and in no distress accompanied by his wife. Next appointment is 2/11/20.

## 2020-02-11 ENCOUNTER — HOSPITAL ENCOUNTER (OUTPATIENT)
Dept: INFUSION THERAPY | Age: 85
Discharge: HOME | End: 2020-02-11
Payer: MEDICARE

## 2020-02-11 VITALS
OXYGEN SATURATION: 99 % | DIASTOLIC BLOOD PRESSURE: 56 MMHG | HEART RATE: 65 BPM | RESPIRATION RATE: 18 BRPM | TEMPERATURE: 97.8 F | SYSTOLIC BLOOD PRESSURE: 109 MMHG

## 2020-02-11 PROCEDURE — 74011250636 HC RX REV CODE- 250/636: Performed by: INTERNAL MEDICINE

## 2020-02-11 PROCEDURE — 96366 THER/PROPH/DIAG IV INF ADDON: CPT

## 2020-02-11 PROCEDURE — 96365 THER/PROPH/DIAG IV INF INIT: CPT

## 2020-02-11 RX ADMIN — SODIUM CHLORIDE 400 MG: 900 INJECTION, SOLUTION INTRAVENOUS at 14:15

## 2020-02-11 NOTE — PROGRESS NOTES
Outpatient Infusion Center Progress Note    0164 Pt admit to [facility] for [address] ambulatory (with rollator) in stable condition. Assessment completed. No new concerns voiced. PIV established with had positive blood return. Medications ordered from pharmacy. Visit Vitals  /54   Pulse 66   Temp 97 °F (36.1 °C)   Resp 18     Medications Administered     iron sucrose (VENOFER) 400 mg in 0.9% sodium chloride 250 mL IVPB w/ 25 mL overfill volume      Admin Date  02/11/2020 Action  New Bag Dose  400 mg Rate  118 mL/hr Route  IntraVENous Administered By  Zina Shirley, RN              5668 Pt tolerated treatment well. Patient refused 30 minute observation period, VSS. IV maintained positive blood return throughout the course of treatment and was removed at the conclusion of therapy. D/c home ambulatory in no distress. Pt aware of no further apts scheduled at this time.      Kristian Collado RN

## 2022-01-14 NOTE — BRIEF OP NOTE
BRIEF OPERATIVE NOTE    Date of Procedure: 10/8/2019   Preoperative Diagnosis: OSTEOMYELITIS RIGHT FOOT  Postoperative Diagnosis: OSTEOMYELITIS RIGHT FOOT    Procedure(s):  RESECTION OF RIGHT 1ST METATARSAL HEAD, SESAMOID BONES  Surgeon(s) and Role:     * Steff Strange DPM - Primary         Surgical Assistant: Dilshad Almeida DPM    Surgical Staff:  Circ-1: Venu Bergman  Scrub RN-1: Rain Green  Event Time In Time Out   Incision Start 1603    Incision Close 1635      Anesthesia: MAC   Estimated Blood Loss: <5cc  Specimens:   ID Type Source Tests Collected by Time Destination   1 : right foot metatarsal head with sesamoid Fresh Bone  Steff Strange DPM 10/8/2019 1613 Pathology   1 : right foot ulcer Wound Foot GRAM STAIN, CULTURE, ANAEROBIC AND AEROBIC Steff Strange DPM 10/8/2019 1612 Microbiology      Findings: consistent with diagnosis  Complications: none  Implants: * No implants in log *     Plantar ulcer was also excised to fresh margins. Patient tolerated the procedure and anesthesia well and was transferred to PACU with vital signs stable and vascular status intact to right foot.
BRIEF OPERATIVE NOTE    Date of Procedure: 10/8/2019   Preoperative Diagnosis: OSTEOMYLITIS RIGHT FIRST MPJ  Postoperative Diagnosis: OSTEOMYLITIS RIGHT FIRST MPJ  Procedure(s):  RESECTION OF RIGHT 1ST METATARSAL HEAD, SESAMOID BONES  Surgeon(s) and Role:     * Leopoldo Guillory DPM - Primary         Surgical Assistant: Sonali Van DPM, PGY 2    Surgical Staff:  Circ-1: Arlys Alpers  Scrub RN-1: Vee Rapp  Event Time In Time Out   Incision Start 1603    Incision Close 1635      Anesthesia: MAC   Estimated Blood Loss: minimal  Specimens:   ID Type Source Tests Collected by Time Destination   1 : right foot metatarsal head with sesamoid Fresh Bone  Leopoldo Guillory DPM 10/8/2019 1613 Pathology   1 : right foot ulcer Wound Foot GRAM STAIN, CULTURE, ANAEROBIC AND AEROBIC Leopoldo Guillory DPM 10/8/2019 1612 Microbiology      Findings: eroded sesamoids. No deep abscess. Pt has a surgical cure.     Complications: none  Implants: * No implants in log *
Patient/Caregiver provided printed discharge information.

## 2024-05-16 NOTE — PROGRESS NOTES
[facility] VISIT NOTE    0730  Pt arrived at [facility] ambulatory and in no distress for Rocephin/Daptomycin . Assessment completed, pt c/o has no concerns    RIght chest port was accessed  0.75 in and ok to treat with no blood return. Pt denies pain and swelling with flushing. Port flushes easily. Medications received:  NS IV  Rocephin IV  Daptomycin IV    Tolerated treatment well, no adverse reaction noted. Port flushed per protocol. .  Patient Vitals for the past 12 hrs:   Temp Pulse Resp BP   06/23/17 0733 97.3 °F (36.3 °C) 65 16 133/70   0840  D/C'd from [facility] ambulatory and in no distress accompanied by wife. show

## (undated) DEVICE — BULB SYRINGE, IRRIGATION WITH PROTECTIVE CAP, 60 CC, INDIVIDUALLY WRAPPED: Brand: DOVER

## (undated) DEVICE — SUPER SPONGES,MEDIUM: Brand: DERMACEA

## (undated) DEVICE — SUTURE VCRL SZ 3-0 L27IN ABSRB UD L26MM SH 1/2 CIR J416H

## (undated) DEVICE — REM POLYHESIVE ADULT PATIENT RETURN ELECTRODE: Brand: VALLEYLAB

## (undated) DEVICE — STRAP,POSITIONING,KNEE/BODY,FOAM,4X60": Brand: MEDLINE

## (undated) DEVICE — SPONGE GZ W4XL4IN COT 12 PLY TYP VII WVN C FLD DSGN

## (undated) DEVICE — Device

## (undated) DEVICE — TUBING SUCT 10FR MAL ALUM SHFT FN CAP VENT UNIV CONN W/ OBT

## (undated) DEVICE — BANDAGE COMPR 9 FTX4 IN SMOOTH COMFORTABLE SYNTH ESMRK LF

## (undated) DEVICE — PAD,ABDOMINAL,5"X9",ST,LF,25/BX: Brand: MEDLINE INDUSTRIES, INC.

## (undated) DEVICE — STRETCH BANDAGE ROLL: Brand: DERMACEA

## (undated) DEVICE — SUT ETHLN 3-0 18IN PS1 BLK --

## (undated) DEVICE — DRAPE,REIN 53X77,STERILE: Brand: MEDLINE

## (undated) DEVICE — SWAB CULT LIQ STUART AGR AERB MOD IN BRK SGL RAYON TIP PLAS 220099] BECTON DICKINSON MICRO]

## (undated) DEVICE — DEVON™ KNEE AND BODY STRAP 60" X 3" (1.5 M X 7.6 CM): Brand: DEVON

## (undated) DEVICE — STERILE POLYISOPRENE POWDER-FREE SURGICAL GLOVES WITH EMOLLIENT COATING: Brand: PROTEXIS

## (undated) DEVICE — PACK,BASIC,SIRUS,V: Brand: MEDLINE

## (undated) DEVICE — COVER LT HNDL PLAS RIG 1 PER PK

## (undated) DEVICE — DRAPE,EXTREMITY,89X128,STERILE: Brand: MEDLINE

## (undated) DEVICE — SUTURE MCRYL SZ 3-0 L27IN ABSRB UD L26MM SH 1/2 CIR Y416H

## (undated) DEVICE — INTENDED FOR TISSUE SEPARATION, AND OTHER PROCEDURES THAT REQUIRE A SHARP SURGICAL BLADE TO PUNCTURE OR CUT.: Brand: BARD-PARKER ® CARBON RIB-BACK BLADES

## (undated) DEVICE — STERILE POLYISOPRENE POWDER-FREE SURGICAL GLOVES: Brand: PROTEXIS

## (undated) DEVICE — DRESSING,GAUZE,XEROFORM,CURAD,1"X8",ST: Brand: CURAD

## (undated) DEVICE — SYR 3ML LL TIP 1/10ML GRAD --

## (undated) DEVICE — SUT PROL 6-0 18IN BV1 DA BLU --

## (undated) DEVICE — SUT ETHLN 3-0 18IN PS2 BLK --

## (undated) DEVICE — SURGICAL PROCEDURE PACK BASIN MAJ SET CUST NO CAUT

## (undated) DEVICE — INFECTION CONTROL KIT SYS

## (undated) DEVICE — (D)PREP SKN CHLRAPRP APPL 26ML -- CONVERT TO ITEM 371833

## (undated) DEVICE — SYRINGE,EAR/ULCER, 2 OZ, STERILE: Brand: MEDLINE

## (undated) DEVICE — SUTURE VCRL SZ 3-0 L27IN ABSRB UD FS-2 L19MM 1/2 CIR J423H

## (undated) DEVICE — TRAY PREP DRY W/ PREM GLV 2 APPL 6 SPNG 2 UNDPD 1 OVERWRAP

## (undated) DEVICE — PENCIL ES L3M BTTN SWCH S STL HEX LOK BLDE ELECTRD HOLSTER

## (undated) DEVICE — ZIMMER® STERILE DISPOSABLE TOURNIQUET CUFF WITH PROTECTIVE SLEEVE AND PLC, DUAL PORT, SINGLE BLADDER, 18 IN. (46 CM)

## (undated) DEVICE — 3M™ COBAN™ NL STERILE NON-LATEX SELF-ADHERENT WRAP, 2084S, 4 IN X 5 YD (10 CM X 4,5 M), 18 ROLLS/CASE: Brand: 3M™ COBAN™

## (undated) DEVICE — COVER LT HNDL BLU PLAS

## (undated) DEVICE — NEEDLE HYPO 25GA L1.5IN BVL ORIENTED ECLIPSE

## (undated) DEVICE — SOL IRRIGATION INJ NACL 0.9% 500ML BTL

## (undated) DEVICE — HANDLE LT SNAP ON ULT DURABLE LENS FOR TRUMPF ALC DISPOSABLE

## (undated) DEVICE — SOLUTION IV 1000ML 0.9% SOD CHL

## (undated) DEVICE — DRAIN KT WND 10FR RND 400ML --

## (undated) DEVICE — SOLUTION IRRIG 1000ML H2O STRL BLT

## (undated) DEVICE — SYR 10ML LUER LOK 1/5ML GRAD --

## (undated) DEVICE — SWAB CULT DBL W/O CHAR RAYON TIP AMIES GEL CLMN FOR COLL

## (undated) DEVICE — ASTOUND STANDARD SURGICAL GOWN, XL: Brand: CONVERTORS

## (undated) DEVICE — GARMENT,MEDLINE,DVT,INT,CALF,MED, GEN2: Brand: MEDLINE

## (undated) DEVICE — SUTURE GORTX SZ 2-0 L36IN NONABSORBABLE L35MM TH-35 1/2 CIR 3N06A

## (undated) DEVICE — TOWEL SURG W17XL27IN STD BLU COT NONFENESTRATED PREWASHED

## (undated) DEVICE — DISPOSABLE TOURNIQUET CUFF SINGLE BLADDER, DUAL PORT AND QUICK CONNECT CONNECTOR: Brand: COLOR CUFF

## (undated) DEVICE — BASIC PACK: Brand: CONVERTORS

## (undated) DEVICE — ARGYLE FRAZIER SURGICAL SUCTION INSTRUMENT 10 FR/CH (3.3 MM): Brand: ARGYLE

## (undated) DEVICE — CULTURETTE SGL EVAC TUBE PALL -- 100/CA

## (undated) DEVICE — ROCKER SWITCH PENCIL BLADE ELECTRODE, HOLSTER: Brand: EDGE